# Patient Record
Sex: FEMALE | Race: WHITE | NOT HISPANIC OR LATINO | Employment: UNEMPLOYED | URBAN - NONMETROPOLITAN AREA
[De-identification: names, ages, dates, MRNs, and addresses within clinical notes are randomized per-mention and may not be internally consistent; named-entity substitution may affect disease eponyms.]

---

## 2017-03-03 ENCOUNTER — TRANSCRIBE ORDERS (OUTPATIENT)
Dept: ADMINISTRATIVE | Facility: HOSPITAL | Age: 77
End: 2017-03-03

## 2017-03-03 DIAGNOSIS — Z78.0 POSTMENOPAUSAL: Primary | ICD-10-CM

## 2017-03-29 ENCOUNTER — HOSPITAL ENCOUNTER (OUTPATIENT)
Dept: BONE DENSITY | Facility: HOSPITAL | Age: 77
Discharge: HOME | End: 2017-03-29
Attending: INTERNAL MEDICINE | Admitting: INTERNAL MEDICINE

## 2017-03-29 DIAGNOSIS — Z78.0 POSTMENOPAUSAL: ICD-10-CM

## 2017-03-29 PROCEDURE — 77080 DXA BONE DENSITY AXIAL: CPT

## 2017-03-29 PROCEDURE — 77080 DXA BONE DENSITY AXIAL: CPT | Performed by: RADIOLOGY

## 2017-08-23 ENCOUNTER — HOSPITAL ENCOUNTER (OUTPATIENT)
Dept: CT IMAGING | Facility: HOSPITAL | Age: 77
Discharge: HOME | End: 2017-08-23
Attending: INTERNAL MEDICINE | Admitting: INTERNAL MEDICINE

## 2017-08-23 ENCOUNTER — TRANSCRIBE ORDERS (OUTPATIENT)
Dept: ADMINISTRATIVE | Facility: HOSPITAL | Age: 77
End: 2017-08-23

## 2017-08-23 ENCOUNTER — HOSPITAL ENCOUNTER (OUTPATIENT)
Dept: GENERAL RADIOLOGY | Facility: HOSPITAL | Age: 77
Discharge: HOME | End: 2017-08-23
Attending: INTERNAL MEDICINE

## 2017-08-23 DIAGNOSIS — M51.9 INTERVERTEBRAL DISC DISORDER: ICD-10-CM

## 2017-08-23 DIAGNOSIS — S39.92XA INJURY OF LOWER BACK, INITIAL ENCOUNTER: ICD-10-CM

## 2017-08-23 DIAGNOSIS — S09.90XA HEAD INJURY, UNSPECIFIED: ICD-10-CM

## 2017-08-23 DIAGNOSIS — S09.90XA HEAD INJURY, UNSPECIFIED: Primary | ICD-10-CM

## 2017-08-23 DIAGNOSIS — M51.9 INTERVERTEBRAL DISC DISORDER: Primary | ICD-10-CM

## 2017-08-23 PROCEDURE — 70450 CT HEAD/BRAIN W/O DYE: CPT

## 2017-08-23 PROCEDURE — 72072 X-RAY EXAM THORAC SPINE 3VWS: CPT

## 2017-08-23 PROCEDURE — 72050 X-RAY EXAM NECK SPINE 4/5VWS: CPT

## 2017-08-23 PROCEDURE — 70450 CT HEAD/BRAIN W/O DYE: CPT | Performed by: RADIOLOGY

## 2017-08-23 PROCEDURE — 72072 X-RAY EXAM THORAC SPINE 3VWS: CPT | Performed by: RADIOLOGY

## 2017-08-23 PROCEDURE — 72110 X-RAY EXAM L-2 SPINE 4/>VWS: CPT

## 2017-08-23 PROCEDURE — 72110 X-RAY EXAM L-2 SPINE 4/>VWS: CPT | Performed by: RADIOLOGY

## 2017-08-23 PROCEDURE — 72050 X-RAY EXAM NECK SPINE 4/5VWS: CPT | Performed by: RADIOLOGY

## 2017-09-05 ENCOUNTER — TRANSCRIBE ORDERS (OUTPATIENT)
Dept: ADMINISTRATIVE | Facility: HOSPITAL | Age: 77
End: 2017-09-05

## 2017-09-05 DIAGNOSIS — M13.0 MULTIPLE JOINT DISEASE: Primary | ICD-10-CM

## 2017-09-05 DIAGNOSIS — S39.92XA UNSPECIFIED INJURY OF LOWER BACK, INITIAL ENCOUNTER: ICD-10-CM

## 2017-09-08 ENCOUNTER — HOSPITAL ENCOUNTER (OUTPATIENT)
Dept: NUCLEAR MEDICINE | Facility: HOSPITAL | Age: 77
Discharge: HOME | End: 2017-09-08
Attending: INTERNAL MEDICINE

## 2017-09-08 DIAGNOSIS — S39.92XA UNSPECIFIED INJURY OF LOWER BACK, INITIAL ENCOUNTER: ICD-10-CM

## 2017-09-08 DIAGNOSIS — M13.0 MULTIPLE JOINT DISEASE: ICD-10-CM

## 2017-09-08 PROCEDURE — 0 TECHNETIUM OXIDRONATE KIT: Performed by: INTERNAL MEDICINE

## 2017-09-08 PROCEDURE — 78306 BONE IMAGING WHOLE BODY: CPT | Performed by: RADIOLOGY

## 2017-09-08 PROCEDURE — 78306 BONE IMAGING WHOLE BODY: CPT

## 2017-09-08 PROCEDURE — A9561 TC99M OXIDRONATE: HCPCS | Performed by: INTERNAL MEDICINE

## 2017-09-08 RX ADMIN — TECHNETIUM TC 99M OXIDRONATE 1 DOSE: 3.15 INJECTION, POWDER, LYOPHILIZED, FOR SOLUTION INTRAVENOUS at 10:15

## 2017-09-28 ENCOUNTER — TRANSCRIBE ORDERS (OUTPATIENT)
Dept: ADMINISTRATIVE | Facility: HOSPITAL | Age: 77
End: 2017-09-28

## 2017-09-28 DIAGNOSIS — S22.050A TRAUMATIC COMPRESSION FRACTURE OF T6 THORACIC VERTEBRA, CLOSED, INITIAL ENCOUNTER (HCC): Primary | ICD-10-CM

## 2017-09-30 ENCOUNTER — HOSPITAL ENCOUNTER (OUTPATIENT)
Dept: MRI IMAGING | Facility: HOSPITAL | Age: 77
Discharge: HOME | End: 2017-09-30
Attending: ORTHOPAEDIC SURGERY | Admitting: ORTHOPAEDIC SURGERY

## 2017-09-30 DIAGNOSIS — S22.050A TRAUMATIC COMPRESSION FRACTURE OF T6 THORACIC VERTEBRA, CLOSED, INITIAL ENCOUNTER (HCC): ICD-10-CM

## 2017-09-30 PROCEDURE — 72146 MRI CHEST SPINE W/O DYE: CPT

## 2017-09-30 PROCEDURE — 72146 MRI CHEST SPINE W/O DYE: CPT | Performed by: RADIOLOGY

## 2021-07-21 ENCOUNTER — TRANSCRIBE ORDERS (OUTPATIENT)
Dept: ADMINISTRATIVE | Facility: HOSPITAL | Age: 81
End: 2021-07-21

## 2021-07-21 DIAGNOSIS — R01.1 CARDIAC MURMUR: Primary | ICD-10-CM

## 2021-07-21 DIAGNOSIS — M79.605 BILATERAL LEG PAIN: ICD-10-CM

## 2021-07-21 DIAGNOSIS — M79.604 BILATERAL LEG PAIN: ICD-10-CM

## 2021-07-21 DIAGNOSIS — R07.9 CHEST PAIN, UNSPECIFIED TYPE: ICD-10-CM

## 2021-07-23 ENCOUNTER — HOSPITAL ENCOUNTER (OUTPATIENT)
Dept: CARDIOLOGY | Facility: HOSPITAL | Age: 81
Discharge: HOME | End: 2021-07-23

## 2021-07-23 ENCOUNTER — HOSPITAL ENCOUNTER (OUTPATIENT)
Dept: GENERAL RADIOLOGY | Facility: HOSPITAL | Age: 81
Discharge: HOME | End: 2021-07-23

## 2021-07-23 DIAGNOSIS — M79.605 BILATERAL LEG PAIN: ICD-10-CM

## 2021-07-23 DIAGNOSIS — M79.604 BILATERAL LEG PAIN: ICD-10-CM

## 2021-07-23 DIAGNOSIS — R07.9 CHEST PAIN, UNSPECIFIED TYPE: ICD-10-CM

## 2021-07-23 DIAGNOSIS — R01.1 CARDIAC MURMUR: ICD-10-CM

## 2021-07-23 PROCEDURE — 71046 X-RAY EXAM CHEST 2 VIEWS: CPT | Performed by: RADIOLOGY

## 2021-07-23 PROCEDURE — 71046 X-RAY EXAM CHEST 2 VIEWS: CPT

## 2021-07-23 PROCEDURE — 93970 EXTREMITY STUDY: CPT | Performed by: RADIOLOGY

## 2021-07-23 PROCEDURE — 93970 EXTREMITY STUDY: CPT

## 2021-08-16 ENCOUNTER — HOSPITAL ENCOUNTER (OUTPATIENT)
Dept: NUCLEAR MEDICINE | Facility: HOSPITAL | Age: 81
Discharge: HOME | End: 2021-08-16

## 2021-08-16 ENCOUNTER — HOSPITAL ENCOUNTER (OUTPATIENT)
Dept: CARDIOLOGY | Facility: HOSPITAL | Age: 81
Discharge: HOME | End: 2021-08-16

## 2021-08-16 DIAGNOSIS — R01.1 CARDIAC MURMUR: ICD-10-CM

## 2021-08-16 DIAGNOSIS — M79.605 BILATERAL LEG PAIN: ICD-10-CM

## 2021-08-16 DIAGNOSIS — R07.9 CHEST PAIN, UNSPECIFIED TYPE: ICD-10-CM

## 2021-08-16 DIAGNOSIS — M79.604 BILATERAL LEG PAIN: ICD-10-CM

## 2021-08-16 LAB
BH CV ECHO MEAS - % IVS THICK: 20.7 %
BH CV ECHO MEAS - % LVPW THICK: 29.8 %
BH CV ECHO MEAS - ACS: 2 CM
BH CV ECHO MEAS - AO ROOT AREA (BSA CORRECTED): 1.5
BH CV ECHO MEAS - AO ROOT AREA: 6.6 CM^2
BH CV ECHO MEAS - AO ROOT DIAM: 2.9 CM
BH CV ECHO MEAS - BSA(HAYCOCK): 2 M^2
BH CV ECHO MEAS - BSA: 1.9 M^2
BH CV ECHO MEAS - BZI_BMI: 30.8 KILOGRAMS/M^2
BH CV ECHO MEAS - BZI_METRIC_HEIGHT: 165.1 CM
BH CV ECHO MEAS - BZI_METRIC_WEIGHT: 83.9 KG
BH CV ECHO MEAS - EDV(CUBED): 118.4 ML
BH CV ECHO MEAS - EDV(MOD-SP4): 47.4 ML
BH CV ECHO MEAS - EDV(TEICH): 113.4 ML
BH CV ECHO MEAS - EF(CUBED): 72.1 %
BH CV ECHO MEAS - EF(MOD-SP4): 51.9 %
BH CV ECHO MEAS - EF(TEICH): 63.6 %
BH CV ECHO MEAS - ESV(CUBED): 33.1 ML
BH CV ECHO MEAS - ESV(MOD-SP4): 22.8 ML
BH CV ECHO MEAS - ESV(TEICH): 41.3 ML
BH CV ECHO MEAS - FS: 34.6 %
BH CV ECHO MEAS - IVS/LVPW: 1.3
BH CV ECHO MEAS - IVSD: 1.6 CM
BH CV ECHO MEAS - IVSS: 2 CM
BH CV ECHO MEAS - LA DIMENSION: 4.3 CM
BH CV ECHO MEAS - LA/AO: 1.5
BH CV ECHO MEAS - LV DIASTOLIC VOL/BSA (35-75): 24.8 ML/M^2
BH CV ECHO MEAS - LV MASS(C)D: 291.7 GRAMS
BH CV ECHO MEAS - LV MASS(C)DI: 152.4 GRAMS/M^2
BH CV ECHO MEAS - LV MASS(C)S: 231.2 GRAMS
BH CV ECHO MEAS - LV MASS(C)SI: 120.8 GRAMS/M^2
BH CV ECHO MEAS - LV SYSTOLIC VOL/BSA (12-30): 11.9 ML/M^2
BH CV ECHO MEAS - LVIDD: 4.9 CM
BH CV ECHO MEAS - LVIDS: 3.2 CM
BH CV ECHO MEAS - LVLD AP4: 6.1 CM
BH CV ECHO MEAS - LVLS AP4: 5.3 CM
BH CV ECHO MEAS - LVOT AREA (M): 2.8 CM^2
BH CV ECHO MEAS - LVOT AREA: 2.8 CM^2
BH CV ECHO MEAS - LVOT DIAM: 1.9 CM
BH CV ECHO MEAS - LVPWD: 1.2 CM
BH CV ECHO MEAS - LVPWS: 1.6 CM
BH CV ECHO MEAS - MV A MAX VEL: 133 CM/SEC
BH CV ECHO MEAS - MV E MAX VEL: 97.9 CM/SEC
BH CV ECHO MEAS - MV E/A: 0.74
BH CV ECHO MEAS - PA ACC TIME: 0.11 SEC
BH CV ECHO MEAS - PA PR(ACCEL): 30 MMHG
BH CV ECHO MEAS - RAP SYSTOLE: 10 MMHG
BH CV ECHO MEAS - RVDD: 1.4 CM
BH CV ECHO MEAS - RVSP: 58.2 MMHG
BH CV ECHO MEAS - SI(CUBED): 44.6 ML/M^2
BH CV ECHO MEAS - SI(MOD-SP4): 12.9 ML/M^2
BH CV ECHO MEAS - SI(TEICH): 37.7 ML/M^2
BH CV ECHO MEAS - SV(CUBED): 85.3 ML
BH CV ECHO MEAS - SV(MOD-SP4): 24.6 ML
BH CV ECHO MEAS - SV(TEICH): 72.1 ML
BH CV ECHO MEAS - TR MAX VEL: 347 CM/SEC
BH CV NUCLEAR PRIOR STUDY: 3
BH CV REST NUCLEAR ISOTOPE DOSE: 10.2 MCI
BH CV STRESS BP STAGE 1: NORMAL
BH CV STRESS BP STAGE 2: NORMAL
BH CV STRESS COMMENTS STAGE 1: NORMAL
BH CV STRESS COMMENTS STAGE 2: NORMAL
BH CV STRESS DOSE REGADENOSON STAGE 1: 0.4
BH CV STRESS DURATION MIN STAGE 1: 0
BH CV STRESS DURATION MIN STAGE 2: 4
BH CV STRESS DURATION SEC STAGE 1: 10
BH CV STRESS DURATION SEC STAGE 2: 0
BH CV STRESS HR STAGE 1: 87
BH CV STRESS HR STAGE 2: 90
BH CV STRESS NUCLEAR ISOTOPE DOSE: 30.2 MCI
BH CV STRESS PROTOCOL 1: NORMAL
BH CV STRESS RECOVERY BP: NORMAL MMHG
BH CV STRESS RECOVERY HR: 83 BPM
BH CV STRESS STAGE 1: 1
BH CV STRESS STAGE 2: 2
MAXIMAL PREDICTED HEART RATE: 141 BPM
MAXIMAL PREDICTED HEART RATE: 141 BPM
PERCENT MAX PREDICTED HR: 63.83 %
STRESS BASELINE BP: NORMAL MMHG
STRESS BASELINE HR: 76 BPM
STRESS PERCENT HR: 75 %
STRESS POST PEAK BP: NORMAL MMHG
STRESS POST PEAK HR: 90 BPM
STRESS TARGET HR: 120 BPM
STRESS TARGET HR: 120 BPM

## 2021-08-16 PROCEDURE — 78452 HT MUSCLE IMAGE SPECT MULT: CPT | Performed by: INTERNAL MEDICINE

## 2021-08-16 PROCEDURE — 25010000002 REGADENOSON 0.4 MG/5ML SOLUTION: Performed by: INTERNAL MEDICINE

## 2021-08-16 PROCEDURE — 93018 CV STRESS TEST I&R ONLY: CPT | Performed by: INTERNAL MEDICINE

## 2021-08-16 PROCEDURE — 93306 TTE W/DOPPLER COMPLETE: CPT

## 2021-08-16 PROCEDURE — 0 TECHNETIUM SESTAMIBI: Performed by: INTERNAL MEDICINE

## 2021-08-16 PROCEDURE — A9500 TC99M SESTAMIBI: HCPCS | Performed by: INTERNAL MEDICINE

## 2021-08-16 PROCEDURE — 93017 CV STRESS TEST TRACING ONLY: CPT

## 2021-08-16 PROCEDURE — 78452 HT MUSCLE IMAGE SPECT MULT: CPT

## 2021-08-16 PROCEDURE — 93306 TTE W/DOPPLER COMPLETE: CPT | Performed by: INTERNAL MEDICINE

## 2021-08-16 RX ORDER — CELECOXIB 100 MG/1
100 CAPSULE ORAL 2 TIMES DAILY PRN
COMMUNITY

## 2021-08-16 RX ORDER — LISINOPRIL 10 MG/1
10 TABLET ORAL DAILY
Status: ON HOLD | COMMUNITY
End: 2022-06-06

## 2021-08-16 RX ORDER — METOPROLOL SUCCINATE 25 MG/1
50 TABLET, EXTENDED RELEASE ORAL 2 TIMES DAILY
Status: ON HOLD | COMMUNITY
End: 2022-06-06 | Stop reason: DRUGHIGH

## 2021-08-16 RX ORDER — LOSARTAN POTASSIUM 50 MG/1
100 TABLET ORAL DAILY
Status: ON HOLD | COMMUNITY
End: 2022-06-06 | Stop reason: DRUGHIGH

## 2021-08-16 RX ADMIN — REGADENOSON 0.4 MG: 0.08 INJECTION, SOLUTION INTRAVENOUS at 10:09

## 2021-08-16 RX ADMIN — TECHNETIUM TC 99M SESTAMIBI 1 DOSE: 1 INJECTION INTRAVENOUS at 08:20

## 2021-08-16 RX ADMIN — TECHNETIUM TC 99M SESTAMIBI 1 DOSE: 1 INJECTION INTRAVENOUS at 10:09

## 2021-09-01 ENCOUNTER — TRANSCRIBE ORDERS (OUTPATIENT)
Dept: ADMINISTRATIVE | Facility: HOSPITAL | Age: 81
End: 2021-09-01

## 2021-09-01 DIAGNOSIS — R07.9 CHEST PAIN, UNSPECIFIED TYPE: Primary | ICD-10-CM

## 2021-09-20 ENCOUNTER — HOSPITAL ENCOUNTER (OUTPATIENT)
Dept: CT IMAGING | Facility: HOSPITAL | Age: 81
Discharge: HOME | End: 2021-09-20
Admitting: INTERNAL MEDICINE

## 2021-09-20 DIAGNOSIS — R07.9 CHEST PAIN, UNSPECIFIED TYPE: ICD-10-CM

## 2021-09-20 LAB — CREAT BLDA-MCNC: 1.1 MG/DL (ref 0.6–1.3)

## 2021-09-20 PROCEDURE — 71270 CT THORAX DX C-/C+: CPT | Performed by: RADIOLOGY

## 2021-09-20 PROCEDURE — 25010000002 IOPAMIDOL 61 % SOLUTION: Performed by: INTERNAL MEDICINE

## 2021-09-20 PROCEDURE — 82565 ASSAY OF CREATININE: CPT

## 2021-09-20 PROCEDURE — 71270 CT THORAX DX C-/C+: CPT

## 2021-09-20 RX ADMIN — IOPAMIDOL 60 ML: 612 INJECTION, SOLUTION INTRAVENOUS at 09:51

## 2022-06-06 ENCOUNTER — HOSPITAL ENCOUNTER (OUTPATIENT)
Facility: HOSPITAL | Age: 82
Setting detail: OBSERVATION
Discharge: HOME | End: 2022-06-07
Attending: STUDENT IN AN ORGANIZED HEALTH CARE EDUCATION/TRAINING PROGRAM | Admitting: INTERNAL MEDICINE

## 2022-06-06 ENCOUNTER — APPOINTMENT (OUTPATIENT)
Dept: GENERAL RADIOLOGY | Facility: HOSPITAL | Age: 82
End: 2022-06-06

## 2022-06-06 ENCOUNTER — APPOINTMENT (OUTPATIENT)
Dept: CT IMAGING | Facility: HOSPITAL | Age: 82
End: 2022-06-06

## 2022-06-06 DIAGNOSIS — J18.9 PNEUMONIA OF BOTH LOWER LOBES DUE TO INFECTIOUS ORGANISM: ICD-10-CM

## 2022-06-06 DIAGNOSIS — I27.20 PULMONARY HYPERTENSION: ICD-10-CM

## 2022-06-06 DIAGNOSIS — J96.01 ACUTE RESPIRATORY FAILURE WITH HYPOXIA: Primary | ICD-10-CM
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
## 2022-06-06 LAB
A-A DO2: 26.5 MMHG (ref 0–300)
ALBUMIN SERPL-MCNC: 4.41 G/DL (ref 3.5–5.2)
ALBUMIN/GLOB SERPL: 1.5 G/DL
ALP SERPL-CCNC: 70 U/L (ref 39–117)
ALT SERPL W P-5'-P-CCNC: 10 U/L (ref 1–33)
ANION GAP SERPL CALCULATED.3IONS-SCNC: 10 MMOL/L (ref 5–15)
ARTERIAL PATENCY WRIST A: ABNORMAL
AST SERPL-CCNC: 16 U/L (ref 1–32)
ATMOSPHERIC PRESS: 726 MMHG
BASE EXCESS BLDA CALC-SCNC: 1.9 MMOL/L (ref 0–2)
BASOPHILS # BLD AUTO: 0.1 10*3/MM3 (ref 0–0.2)
BASOPHILS NFR BLD AUTO: 1 % (ref 0–1.5)
BDY SITE: ABNORMAL
BILIRUB SERPL-MCNC: 0.5 MG/DL (ref 0–1.2)
BODY TEMPERATURE: 0 C
BUN SERPL-MCNC: 20 MG/DL (ref 8–23)
BUN/CREAT SERPL: 17.2 (ref 7–25)
CALCIUM SPEC-SCNC: 9.7 MG/DL (ref 8.6–10.5)
CHLORIDE SERPL-SCNC: 98 MMOL/L (ref 98–107)
CO2 BLDA-SCNC: 29.8 MMOL/L (ref 22–33)
CO2 SERPL-SCNC: 26 MMOL/L (ref 22–29)
COHGB MFR BLD: 1.8 % (ref 0–5)
CREAT SERPL-MCNC: 1.16 MG/DL (ref 0.57–1)
D-LACTATE SERPL-SCNC: 1.3 MMOL/L (ref 0.5–2)
DEPRECATED RDW RBC AUTO: 61.6 FL (ref 37–54)
EGFRCR SERPLBLD CKD-EPI 2021: 47.8 ML/MIN/1.73
EOSINOPHIL # BLD AUTO: 0.35 10*3/MM3 (ref 0–0.4)
EOSINOPHIL NFR BLD AUTO: 3.6 % (ref 0.3–6.2)
ERYTHROCYTE [DISTWIDTH] IN BLOOD BY AUTOMATED COUNT: 15.6 % (ref 12.3–15.4)
FLUAV RNA RESP QL NAA+PROBE: NOT DETECTED
FLUBV RNA RESP QL NAA+PROBE: NOT DETECTED
GLOBULIN UR ELPH-MCNC: 2.9 GM/DL
GLUCOSE SERPL-MCNC: 139 MG/DL (ref 65–99)
HCO3 BLDA-SCNC: 28.3 MMOL/L (ref 20–26)
HCT VFR BLD AUTO: 37.6 % (ref 34–46.6)
HCT VFR BLD CALC: 37.7 % (ref 38–51)
HGB BLD-MCNC: 12.4 G/DL (ref 12–15.9)
HGB BLDA-MCNC: 12.3 G/DL (ref 13.5–17.5)
IMM GRANULOCYTES # BLD AUTO: 0.07 10*3/MM3 (ref 0–0.05)
IMM GRANULOCYTES NFR BLD AUTO: 0.7 % (ref 0–0.5)
INHALED O2 CONCENTRATION: 21 %
INR PPP: 0.92 (ref 0.9–1.1)
LYMPHOCYTES # BLD AUTO: 1.78 10*3/MM3 (ref 0.7–3.1)
LYMPHOCYTES NFR BLD AUTO: 18.2 % (ref 19.6–45.3)
Lab: ABNORMAL
MCH RBC QN AUTO: 35.8 PG (ref 26.6–33)
MCHC RBC AUTO-ENTMCNC: 33 G/DL (ref 31.5–35.7)
MCV RBC AUTO: 108.7 FL (ref 79–97)
METHGB BLD QL: 0.3 % (ref 0–3)
MODALITY: ABNORMAL
MONOCYTES # BLD AUTO: 0.52 10*3/MM3 (ref 0.1–0.9)
MONOCYTES NFR BLD AUTO: 5.3 % (ref 5–12)
NEUTROPHILS NFR BLD AUTO: 6.98 10*3/MM3 (ref 1.7–7)
NEUTROPHILS NFR BLD AUTO: 71.2 % (ref 42.7–76)
NOTE: ABNORMAL
NRBC BLD AUTO-RTO: 0.3 /100 WBC (ref 0–0.2)
NT-PROBNP SERPL-MCNC: 85.1 PG/ML (ref 0–1800)
OXYHGB MFR BLDV: 85.6 % (ref 94–99)
PCO2 BLDA: 51 MM HG (ref 35–45)
PCO2 TEMP ADJ BLD: ABNORMAL MM[HG]
PH BLDA: 7.35 PH UNITS (ref 7.35–7.45)
PH, TEMP CORRECTED: ABNORMAL
PLATELET # BLD AUTO: 274 10*3/MM3 (ref 140–450)
PMV BLD AUTO: 9.4 FL (ref 6–12)
PO2 BLDA: 58.6 MM HG (ref 83–108)
PO2 TEMP ADJ BLD: ABNORMAL MM[HG]
POTASSIUM SERPL-SCNC: 4.5 MMOL/L (ref 3.5–5.2)
PROCALCITONIN SERPL-MCNC: 0.08 NG/ML (ref 0–0.25)
PROT SERPL-MCNC: 7.3 G/DL (ref 6–8.5)
PROTHROMBIN TIME: 12.5 SECONDS (ref 12.1–14.7)
QT INTERVAL: 358 MS
QTC INTERVAL: 420 MS
RBC # BLD AUTO: 3.46 10*6/MM3 (ref 3.77–5.28)
SAO2 % BLDCOA: 87.4 % (ref 94–99)
SARS-COV-2 RNA RESP QL NAA+PROBE: NOT DETECTED
SODIUM SERPL-SCNC: 134 MMOL/L (ref 136–145)
TROPONIN T SERPL-MCNC: <0.01 NG/ML (ref 0–0.03)
VENTILATOR MODE: ABNORMAL
WBC NRBC COR # BLD: 9.8 10*3/MM3 (ref 3.4–10.8)

## 2022-06-06 PROCEDURE — 93005 ELECTROCARDIOGRAM TRACING: CPT | Performed by: STUDENT IN AN ORGANIZED HEALTH CARE EDUCATION/TRAINING PROGRAM

## 2022-06-06 PROCEDURE — 82805 BLOOD GASES W/O2 SATURATION: CPT

## 2022-06-06 PROCEDURE — 36415 COLL VENOUS BLD VENIPUNCTURE: CPT

## 2022-06-06 PROCEDURE — 87636 SARSCOV2 & INF A&B AMP PRB: CPT | Performed by: STUDENT IN AN ORGANIZED HEALTH CARE EDUCATION/TRAINING PROGRAM

## 2022-06-06 PROCEDURE — G0378 HOSPITAL OBSERVATION PER HR: HCPCS

## 2022-06-06 PROCEDURE — 80053 COMPREHEN METABOLIC PANEL: CPT | Performed by: STUDENT IN AN ORGANIZED HEALTH CARE EDUCATION/TRAINING PROGRAM

## 2022-06-06 PROCEDURE — 71275 CT ANGIOGRAPHY CHEST: CPT

## 2022-06-06 PROCEDURE — 73030 X-RAY EXAM OF SHOULDER: CPT

## 2022-06-06 PROCEDURE — 73030 X-RAY EXAM OF SHOULDER: CPT | Performed by: RADIOLOGY

## 2022-06-06 PROCEDURE — 72128 CT CHEST SPINE W/O DYE: CPT | Performed by: RADIOLOGY

## 2022-06-06 PROCEDURE — 83880 ASSAY OF NATRIURETIC PEPTIDE: CPT | Performed by: STUDENT IN AN ORGANIZED HEALTH CARE EDUCATION/TRAINING PROGRAM

## 2022-06-06 PROCEDURE — 71045 X-RAY EXAM CHEST 1 VIEW: CPT

## 2022-06-06 PROCEDURE — 87040 BLOOD CULTURE FOR BACTERIA: CPT | Performed by: STUDENT IN AN ORGANIZED HEALTH CARE EDUCATION/TRAINING PROGRAM

## 2022-06-06 PROCEDURE — 71045 X-RAY EXAM CHEST 1 VIEW: CPT | Performed by: RADIOLOGY

## 2022-06-06 PROCEDURE — 99220 PR INITIAL OBSERVATION CARE/DAY 70 MINUTES: CPT | Performed by: INTERNAL MEDICINE

## 2022-06-06 PROCEDURE — 72125 CT NECK SPINE W/O DYE: CPT | Performed by: RADIOLOGY

## 2022-06-06 PROCEDURE — 96375 TX/PRO/DX INJ NEW DRUG ADDON: CPT

## 2022-06-06 PROCEDURE — 73080 X-RAY EXAM OF ELBOW: CPT

## 2022-06-06 PROCEDURE — 71275 CT ANGIOGRAPHY CHEST: CPT | Performed by: RADIOLOGY

## 2022-06-06 PROCEDURE — 72131 CT LUMBAR SPINE W/O DYE: CPT

## 2022-06-06 PROCEDURE — 25010000002 CEFTRIAXONE PER 250 MG: Performed by: STUDENT IN AN ORGANIZED HEALTH CARE EDUCATION/TRAINING PROGRAM

## 2022-06-06 PROCEDURE — 85610 PROTHROMBIN TIME: CPT | Performed by: STUDENT IN AN ORGANIZED HEALTH CARE EDUCATION/TRAINING PROGRAM

## 2022-06-06 PROCEDURE — 96365 THER/PROPH/DIAG IV INF INIT: CPT

## 2022-06-06 PROCEDURE — 84145 PROCALCITONIN (PCT): CPT | Performed by: STUDENT IN AN ORGANIZED HEALTH CARE EDUCATION/TRAINING PROGRAM

## 2022-06-06 PROCEDURE — 72128 CT CHEST SPINE W/O DYE: CPT

## 2022-06-06 PROCEDURE — 84484 ASSAY OF TROPONIN QUANT: CPT | Performed by: STUDENT IN AN ORGANIZED HEALTH CARE EDUCATION/TRAINING PROGRAM

## 2022-06-06 PROCEDURE — 85025 COMPLETE CBC W/AUTO DIFF WBC: CPT | Performed by: STUDENT IN AN ORGANIZED HEALTH CARE EDUCATION/TRAINING PROGRAM

## 2022-06-06 PROCEDURE — 72125 CT NECK SPINE W/O DYE: CPT

## 2022-06-06 PROCEDURE — 25010000002 METHYLPREDNISOLONE PER 125 MG: Performed by: STUDENT IN AN ORGANIZED HEALTH CARE EDUCATION/TRAINING PROGRAM

## 2022-06-06 PROCEDURE — 83605 ASSAY OF LACTIC ACID: CPT | Performed by: STUDENT IN AN ORGANIZED HEALTH CARE EDUCATION/TRAINING PROGRAM

## 2022-06-06 PROCEDURE — 0 IOVERSOL 74 % SOLUTION: Performed by: STUDENT IN AN ORGANIZED HEALTH CARE EDUCATION/TRAINING PROGRAM

## 2022-06-06 PROCEDURE — 72131 CT LUMBAR SPINE W/O DYE: CPT | Performed by: RADIOLOGY

## 2022-06-06 PROCEDURE — 83050 HGB METHEMOGLOBIN QUAN: CPT

## 2022-06-06 PROCEDURE — C9803 HOPD COVID-19 SPEC COLLECT: HCPCS

## 2022-06-06 PROCEDURE — 82375 ASSAY CARBOXYHB QUANT: CPT

## 2022-06-06 PROCEDURE — 94640 AIRWAY INHALATION TREATMENT: CPT

## 2022-06-06 PROCEDURE — 94799 UNLISTED PULMONARY SVC/PX: CPT

## 2022-06-06 PROCEDURE — 36600 WITHDRAWAL OF ARTERIAL BLOOD: CPT

## 2022-06-06 PROCEDURE — 99284 EMERGENCY DEPT VISIT MOD MDM: CPT

## 2022-06-06 RX ORDER — LOSARTAN POTASSIUM 50 MG/1
100 TABLET ORAL DAILY
Status: CANCELLED | OUTPATIENT
Start: 2022-06-07

## 2022-06-06 RX ORDER — HYDRALAZINE HYDROCHLORIDE 10 MG/1
10 TABLET, FILM COATED ORAL EVERY 8 HOURS PRN
Status: DISCONTINUED | OUTPATIENT
Start: 2022-06-06 | End: 2022-06-07 | Stop reason: HOSPADM

## 2022-06-06 RX ORDER — CELECOXIB 100 MG/1
100 CAPSULE ORAL 2 TIMES DAILY PRN
Status: CANCELLED | OUTPATIENT
Start: 2022-06-06

## 2022-06-06 RX ORDER — OXAZEPAM 15 MG/1
15 CAPSULE ORAL DAILY PRN
COMMUNITY

## 2022-06-06 RX ORDER — METHYLPREDNISOLONE SODIUM SUCCINATE 125 MG/2ML
125 INJECTION, POWDER, LYOPHILIZED, FOR SOLUTION INTRAMUSCULAR; INTRAVENOUS ONCE
Status: COMPLETED | OUTPATIENT
Start: 2022-06-06 | End: 2022-06-06

## 2022-06-06 RX ORDER — ONDANSETRON 2 MG/ML
4 INJECTION INTRAMUSCULAR; INTRAVENOUS EVERY 6 HOURS PRN
Status: DISCONTINUED | OUTPATIENT
Start: 2022-06-06 | End: 2022-06-07 | Stop reason: HOSPADM

## 2022-06-06 RX ORDER — IPRATROPIUM BROMIDE AND ALBUTEROL SULFATE 2.5; .5 MG/3ML; MG/3ML
3 SOLUTION RESPIRATORY (INHALATION)
Status: DISCONTINUED | OUTPATIENT
Start: 2022-06-06 | End: 2022-06-07

## 2022-06-06 RX ORDER — SODIUM CHLORIDE 0.9 % (FLUSH) 0.9 %
10 SYRINGE (ML) INJECTION AS NEEDED
Status: DISCONTINUED | OUTPATIENT
Start: 2022-06-06 | End: 2022-06-07 | Stop reason: HOSPADM

## 2022-06-06 RX ORDER — ACETAMINOPHEN 325 MG/1
650 TABLET ORAL EVERY 6 HOURS PRN
Status: DISCONTINUED | OUTPATIENT
Start: 2022-06-06 | End: 2022-06-07 | Stop reason: HOSPADM

## 2022-06-06 RX ORDER — CYANOCOBALAMIN 1000 UG/ML
1000 INJECTION, SOLUTION INTRAMUSCULAR; SUBCUTANEOUS
COMMUNITY
Start: 2022-06-18

## 2022-06-06 RX ORDER — CYANOCOBALAMIN 1000 UG/ML
1000 INJECTION, SOLUTION INTRAMUSCULAR; SUBCUTANEOUS
Status: CANCELLED | OUTPATIENT
Start: 2022-06-18

## 2022-06-06 RX ORDER — METOPROLOL TARTRATE 50 MG/1
50 TABLET, FILM COATED ORAL EVERY 12 HOURS SCHEDULED
Status: DISCONTINUED | OUTPATIENT
Start: 2022-06-06 | End: 2022-06-07 | Stop reason: HOSPADM

## 2022-06-06 RX ORDER — SODIUM CHLORIDE 0.9 % (FLUSH) 0.9 %
10 SYRINGE (ML) INJECTION EVERY 12 HOURS SCHEDULED
Status: DISCONTINUED | OUTPATIENT
Start: 2022-06-06 | End: 2022-06-07 | Stop reason: HOSPADM

## 2022-06-06 RX ORDER — METOPROLOL TARTRATE 50 MG/1
50 TABLET, FILM COATED ORAL 2 TIMES DAILY
COMMUNITY

## 2022-06-06 RX ORDER — LOSARTAN POTASSIUM 100 MG/1
100 TABLET ORAL DAILY
COMMUNITY

## 2022-06-06 RX ORDER — METOPROLOL TARTRATE 50 MG/1
50 TABLET, FILM COATED ORAL 2 TIMES DAILY
Status: CANCELLED | OUTPATIENT
Start: 2022-06-06

## 2022-06-06 RX ORDER — LORAZEPAM 0.5 MG/1
0.5 TABLET ORAL DAILY PRN
Status: CANCELLED | OUTPATIENT
Start: 2022-06-06

## 2022-06-06 RX ORDER — CHOLECALCIFEROL (VITAMIN D3) 50 MCG
2000 TABLET ORAL DAILY
COMMUNITY

## 2022-06-06 RX ADMIN — CEFTRIAXONE 1 G: 1 INJECTION, POWDER, FOR SOLUTION INTRAMUSCULAR; INTRAVENOUS at 15:50

## 2022-06-06 RX ADMIN — Medication 10 ML: at 21:29

## 2022-06-06 RX ADMIN — SODIUM CHLORIDE 500 ML: 9 INJECTION, SOLUTION INTRAVENOUS at 12:41

## 2022-06-06 RX ADMIN — METHYLPREDNISOLONE SODIUM SUCCINATE 125 MG: 125 INJECTION, POWDER, FOR SOLUTION INTRAMUSCULAR; INTRAVENOUS at 16:45

## 2022-06-06 RX ADMIN — IOVERSOL 100 ML: 741 INJECTION INTRA-ARTERIAL; INTRAVENOUS at 14:17

## 2022-06-06 RX ADMIN — METOPROLOL TARTRATE 50 MG: 50 TABLET, FILM COATED ORAL at 21:29

## 2022-06-06 RX ADMIN — ACETAMINOPHEN 650 MG: 325 TABLET ORAL at 21:29

## 2022-06-06 RX ADMIN — IPRATROPIUM BROMIDE AND ALBUTEROL SULFATE 3 ML: .5; 3 SOLUTION RESPIRATORY (INHALATION) at 16:23
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
## 2022-06-06 NOTE — ED NOTES
Report from ANDRE Obrien. Pt care assumed at this time. Pt is in CT. Friend at bedside instructed to push call light when pt returns.

## 2022-06-06 NOTE — PLAN OF CARE
Goal Outcome Evaluation:            Pt alert and oriented and independent. 2lnc. Pt states she will be staying overnight for observation of her breathing.
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
## 2022-06-07 ENCOUNTER — APPOINTMENT (OUTPATIENT)
Dept: CARDIOLOGY | Facility: HOSPITAL | Age: 82
End: 2022-06-07

## 2022-06-07 VITALS
OXYGEN SATURATION: 94 % | BODY MASS INDEX: 30.53 KG/M2 | HEIGHT: 66 IN | WEIGHT: 190 LBS | RESPIRATION RATE: 18 BRPM | SYSTOLIC BLOOD PRESSURE: 145 MMHG | DIASTOLIC BLOOD PRESSURE: 59 MMHG | TEMPERATURE: 97.6 F | HEART RATE: 89 BPM

## 2022-06-07 LAB
BH CV ECHO MEAS - ACS: 2.3 CM
BH CV ECHO MEAS - AO MAX PG: 26.8 MMHG
BH CV ECHO MEAS - AO MEAN PG: 12 MMHG
BH CV ECHO MEAS - AO ROOT DIAM: 3.5 CM
BH CV ECHO MEAS - AO V2 MAX: 259 CM/SEC
BH CV ECHO MEAS - AO V2 VTI: 43.6 CM
BH CV ECHO MEAS - AVA(I,D): 3.4 CM2
BH CV ECHO MEAS - EDV(CUBED): 118.4 ML
BH CV ECHO MEAS - EDV(MOD-SP4): 63.7 ML
BH CV ECHO MEAS - EF(MOD-SP4): 73.5 %
BH CV ECHO MEAS - ESV(CUBED): 29.2 ML
BH CV ECHO MEAS - ESV(MOD-SP4): 16.9 ML
BH CV ECHO MEAS - FS: 37.3 %
BH CV ECHO MEAS - IVS/LVPW: 1.14 CM
BH CV ECHO MEAS - IVSD: 1.38 CM
BH CV ECHO MEAS - LA DIMENSION: 3.7 CM
BH CV ECHO MEAS - LAT PEAK E' VEL: 12.2 CM/SEC
BH CV ECHO MEAS - LV DIASTOLIC VOL/BSA (35-75): 32.5 CM2
BH CV ECHO MEAS - LV MASS(C)D: 253.1 GRAMS
BH CV ECHO MEAS - LV MAX PG: 13 MMHG
BH CV ECHO MEAS - LV MEAN PG: 6 MMHG
BH CV ECHO MEAS - LV SYSTOLIC VOL/BSA (12-30): 8.6 CM2
BH CV ECHO MEAS - LV V1 MAX: 180 CM/SEC
BH CV ECHO MEAS - LV V1 VTI: 35.7 CM
BH CV ECHO MEAS - LVIDD: 4.9 CM
BH CV ECHO MEAS - LVIDS: 3.1 CM
BH CV ECHO MEAS - LVOT AREA: 4.2 CM2
BH CV ECHO MEAS - LVOT DIAM: 2.3 CM
BH CV ECHO MEAS - LVPWD: 1.21 CM
BH CV ECHO MEAS - MED PEAK E' VEL: 9 CM/SEC
BH CV ECHO MEAS - MV A MAX VEL: 136 CM/SEC
BH CV ECHO MEAS - MV E MAX VEL: 103 CM/SEC
BH CV ECHO MEAS - MV E/A: 0.76
BH CV ECHO MEAS - PA ACC TIME: 0.1 SEC
BH CV ECHO MEAS - PA PR(ACCEL): 36.3 MMHG
BH CV ECHO MEAS - RAP SYSTOLE: 10 MMHG
BH CV ECHO MEAS - RVSP: 68.4 MMHG
BH CV ECHO MEAS - SI(MOD-SP4): 23.9 ML/M2
BH CV ECHO MEAS - SV(LVOT): 148.3 ML
BH CV ECHO MEAS - SV(MOD-SP4): 46.8 ML
BH CV ECHO MEAS - TAPSE (>1.6): 2.08 CM
BH CV ECHO MEAS - TR MAX PG: 58.4 MMHG
BH CV ECHO MEAS - TR MAX VEL: 382 CM/SEC
BH CV ECHO MEASUREMENTS AVERAGE E/E' RATIO: 9.72
LEFT ATRIUM VOLUME INDEX: 18.9 ML/M2
MAXIMAL PREDICTED HEART RATE: 140 BPM
STRESS TARGET HR: 119 BPM

## 2022-06-07 PROCEDURE — 97161 PT EVAL LOW COMPLEX 20 MIN: CPT

## 2022-06-07 PROCEDURE — 93306 TTE W/DOPPLER COMPLETE: CPT

## 2022-06-07 PROCEDURE — 93306 TTE W/DOPPLER COMPLETE: CPT | Performed by: SPECIALIST

## 2022-06-07 PROCEDURE — 94799 UNLISTED PULMONARY SVC/PX: CPT

## 2022-06-07 PROCEDURE — G0378 HOSPITAL OBSERVATION PER HR: HCPCS

## 2022-06-07 PROCEDURE — 99217 PR OBSERVATION CARE DISCHARGE MANAGEMENT: CPT | Performed by: INTERNAL MEDICINE

## 2022-06-07 PROCEDURE — 97165 OT EVAL LOW COMPLEX 30 MIN: CPT

## 2022-06-07 RX ORDER — LOSARTAN POTASSIUM 50 MG/1
100 TABLET ORAL
Status: DISCONTINUED | OUTPATIENT
Start: 2022-06-07 | End: 2022-06-07 | Stop reason: HOSPADM

## 2022-06-07 RX ADMIN — LOSARTAN POTASSIUM 100 MG: 50 TABLET, FILM COATED ORAL at 09:15

## 2022-06-07 RX ADMIN — METOPROLOL TARTRATE 50 MG: 50 TABLET, FILM COATED ORAL at 09:14

## 2022-06-07 RX ADMIN — Medication 10 ML: at 09:16

## 2022-06-07 NOTE — CASE MANAGEMENT/SOCIAL WORK
Discharge Planning Assessment   [place]     Patient Name: Aliyah Adkins  MRN: [id number]  Today's Date: 6/7/2022    Admit Date: 6/6/2022           Discharge Plan     Row Name 06/07/22 1135       Plan    Final Discharge Disposition Code 01 - home or self-care    Final Note Pt. is being discharged home on this date. Pt. states her sister is going to provide transportation for her on this date. CM has set up DME for pt. No other needs identified.                     JANINE Haro

## 2022-06-07 NOTE — NURSING NOTE
Pt oxygen at sitting fluctating between 88 and 90%. Ambulating in room pt dropped to 84%. No dizziness reported but pt reported feeling SOB sometimes while doing home chores.

## 2022-06-07 NOTE — PLAN OF CARE
Goal Outcome Evaluation:  Plan of Care Reviewed With: patient        Progress: no change  Outcome Evaluation: Pt resting in bed at this time. VSS. No complaints or acute changes. Will continue to monitor.
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
## 2022-06-07 NOTE — CASE MANAGEMENT/SOCIAL WORK
Continued Stay Note  [facility]     Patient Name: Aliyah Adkins  MRN: [id number]  Today's Date: 6/7/2022    Admit Date: 6/6/2022     Discharge Plan     Row Name 06/07/22 1132       Plan    Plan Pt. admitted 6/6/22. SS received a consult for discharge planning. SS spoke with pt on this date. Pt lives at [address] [place] [address]. Pt's PCP is Jabari Keller. Pt. does not have HH/DME. Pt. utilizes [facility]. Pt. does not have a POA, but states she has a will. Pt states she sister will be providing transportation. Home at discharge. SS following.  Simultaneous filing. User may be unaware of other data.    Provided Post Acute Provider List? Yes    Post Acute Provider List DME Supplier    Delivered To Patient    Method of Delivery Telephone  Patient states she has no prefernce of DME company               Discharge Codes    No documentation.               Expected Discharge Date and Time     Expected Discharge Date Expected Discharge Time    Jun 7, 2022             Nicole Peters RN

## 2022-06-07 NOTE — CASE MANAGEMENT/SOCIAL WORK
Continued Stay Note  [facility]     Patient Name: Aliyah Adkins  MRN: [id number]  Today's Date: 6/7/2022    Admit Date: 6/6/2022     Discharge Plan     Row Name 06/07/22 1140       Plan    Plan CM faxed orders to [facility] and spoke with Sola who states they will bring up portable tank tank for patient before discharge.    Row Name 06/07/22 1135       Plan    Final Discharge Disposition Code 01 - home or self-care    Final Note Pt. is being discharged home on this date. Pt. states her sister is going to provide transportation for her on this date. CM has set up DME for pt. No other needs identified.    Row Name 06/07/22 1132       Plan    Plan Pt. admitted 6/6/22. SS received a consult for discharge planning. SS spoke with pt on this date. Pt lives at [address] [place] [address]. Pt's PCP is Jabari Keller. Pt. does not have HH/DME. Pt. utilizes [facility]. Pt. does not have a POA, but states she has a will. Pt states she sister will be providing transportation. Home at discharge. SS following.  Simultaneous filing. User may be unaware of other data.    Provided Post Acute Provider List? Yes    Post Acute Provider List DME Supplier    Delivered To Patient    Method of Delivery Telephone  Patient states she has no prefernce of DME company               Discharge Codes    No documentation.               Expected Discharge Date and Time     Expected Discharge Date Expected Discharge Time    Jun 7, 2022             Nicole Peters RN
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
## 2022-06-07 NOTE — CASE MANAGEMENT/SOCIAL WORK
Discharge Planning Assessment  [facility]     Patient Name: Aliyah Adkins  MRN: [id number]  Today's Date: 6/7/2022    Admit Date: 6/6/2022     Discharge Needs Assessment     Row Name 06/07/22 1131       Living Environment    People in Home alone    Current Living Arrangements home    Primary Care Provided by self    Provides Primary Care For no one    Family Caregiver if Needed sibling(s)    Quality of Family Relationships unable to assess    Able to Return to Prior Arrangements yes       Resource/Environmental Concerns    Resource/Environmental Concerns none       Transition Planning    Patient/Family Anticipates Transition to home    Transportation Anticipated family or friend will provide       Discharge Needs Assessment    Equipment Currently Used at Home none               Discharge Plan     Row Name 06/07/22 1132       Plan    Plan Pt. admitted 6/6/22. SS received a consult for discharge planning. SS spoke with pt on this date. Pt lives at [address] [place] [address]. Pt's PCP is Jabari Keller. Pt. does not have HH/DME. Pt. utilizes [facility] pharmacy. Pt. does not have a POA, but states she has a will. Pt states she sister will be providing transportation. Home at discharge. SS following.  Simultaneous filing. User may be unaware of other data.    Provided Post Acute Provider List? Yes    Post Acute Provider List DME Supplier    Delivered To Patient    Method of Delivery Telephone  Patient states she has no prefernce of DME company            JANINE Haro

## 2022-06-07 NOTE — DISCHARGE INSTR - ACTIVITY
[facility]  You can reach the office at .
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
## 2022-06-11 LAB
BACTERIA SPEC AEROBE CULT: NORMAL
BACTERIA SPEC AEROBE CULT: NORMAL

## 2022-07-01 ENCOUNTER — OFFICE VISIT (OUTPATIENT)
Dept: PULMONOLOGY | Facility: CLINIC | Age: 82
End: 2022-07-01

## 2022-07-01 ENCOUNTER — PATIENT ROUNDING (BHMG ONLY) (OUTPATIENT)
Dept: PULMONOLOGY | Facility: CLINIC | Age: 82
End: 2022-07-01

## 2022-07-01 VITALS
BODY MASS INDEX: 30.86 KG/M2 | HEIGHT: 66 IN | SYSTOLIC BLOOD PRESSURE: 132 MMHG | OXYGEN SATURATION: 94 % | TEMPERATURE: 97.7 F | HEART RATE: 81 BPM | WEIGHT: 192 LBS | DIASTOLIC BLOOD PRESSURE: 82 MMHG

## 2022-07-01 DIAGNOSIS — G47.10 HYPERSOMNIA: ICD-10-CM

## 2022-07-01 DIAGNOSIS — R06.02 SHORTNESS OF BREATH: ICD-10-CM

## 2022-07-01 DIAGNOSIS — I27.20 PULMONARY HYPERTENSION: Primary | ICD-10-CM

## 2022-07-01 DIAGNOSIS — E66.9 OBESITY (BMI 30-39.9): ICD-10-CM

## 2022-07-01 PROCEDURE — 99203 OFFICE O/P NEW LOW 30 MIN: CPT | Performed by: NURSE PRACTITIONER

## 2022-07-01 RX ORDER — LOVASTATIN 40 MG/1
TABLET ORAL
COMMUNITY
Start: 2022-06-14

## 2022-07-01 NOTE — PROGRESS NOTES
"Chief Complaint  Shortness of Breath and PULMONARY HYPERTENSION    Subjective        Aliyah Adkins presents to [facility] PULMONARY & CRITICAL CARE MEDICINE  History of Present Illness     Ms. Adkins is an 82 year old female with a medical history significant for arthritis, hyperlipidemia, and hypertension.      She presents today for evaluation of shortness of breath and pulmonary hypertension.  She states that she does have some minimal shortness of breath on exertion.  She is currently on no inhalers.  She is a former smoker, quitting in 1990.  She states that she feels like she is doing ok.  She states that she really does not feel that she has any major problems with her lungs.  She attributes her shortness of breath to her age.  She does tell me that she has a sleep study scheduled.          Objective   Vital Signs:  /82 (BP Location: Left arm, Patient Position: Sitting)   Pulse 81   Temp 97.7 °F (36.5 °C)   Ht 167.6 cm (66\")   Wt 87.1 kg (192 lb)   SpO2 94%   BMI 30.99 kg/m²   Estimated body mass index is 30.99 kg/m² as calculated from the following:    Height as of this encounter: 167.6 cm (66\").    Weight as of this encounter: 87.1 kg (192 lb).    BMI is >= 30 and <35. (Class 1 Obesity). The following options were offered after discussion;: exercise counseling/recommendations and nutrition counseling/recommendations      Physical Exam     GENERAL APPEARANCE: Well developed, well nourished, alert and cooperative, and appears to be in no acute distress.    HEAD: normocephalic. Atraumatic.    EYES: PERRL, EOMI. Vision is grossly intact.    THROAT: Oral cavity and pharynx normal. No inflammation, swelling, exudate, or lesions.     NECK: Neck supple.  No thyromegaly.    CARDIAC: Normal S1 and S2. No S3, S4 or murmurs. Rhythm is regular.     RESPIRATORY:Bilateral air entry positive. Bilateral diminished breath sounds. No wheezing, crackles or rhonchi noted.    GI: Positive bowel " sounds. Soft, nondistended, nontender.     MUSCULOSKELETAL: No significant deformity or joint abnormality. No edema. Peripheral pulses intact. No varicosities.    NEUROLOGICAL: Strength and sensation symmetric and intact throughout.     PSYCHIATRIC: The mental examination revealed the patient was oriented to person, place, and time.     Result Review :  The following data was reviewed by: LATONYA Wagner on 07/01/2022:  Common labs    Common Labsle 9/20/21 6/6/22 6/6/22     1128 1128   Glucose   139 (A)   BUN   20   Creatinine 1.10  1.16 (A)   Sodium   134 (A)   Potassium   4.5   Chloride   98   Calcium   9.7   Albumin   4.41   Total Bilirubin   0.5   Alkaline Phosphatase   70   AST (SGOT)   16   ALT (SGPT)   10   WBC  9.80    Hemoglobin  12.4    Hematocrit  37.6    Platelets  274    (A) Abnormal value       Comments are available for some flowsheets but are not being displayed.           Data reviewed: CT chest          Assessment and Plan   Diagnoses and all orders for this visit:    1. Pulmonary hypertension (HCC) (Primary)    2. Shortness of breath    3. Hypersomnia    4. Obesity (BMI 30-39.9)            CT chest was reviewed.  She does not want to do a PFT at this time.  Sleep study has been scheduled.  Results for orders placed during the hospital encounter of 06/06/22    Adult Transthoracic Echo Complete W/ Cont if Necessary Per Protocol    Interpretation Summary  · Left ventricular wall thickness is consistent with mild concentric hypertrophy.  · Left ventricular ejection fraction appears to be 66 - 70%. Left ventricular systolic function is normal.  · Left ventricular diastolic function is consistent with (grade I) impaired relaxation.  · The right ventricular cavity is mildly dilated.  · The right atrial cavity is borderline dilated.  · Estimated right ventricular systolic pressure from tricuspid regurgitation is markedly elevated ( 68 mmHg).      She is following with cardiology.    Follow Up    Return if symptoms worsen or fail to improve.  Patient was given instructions and counseling regarding her condition or for health maintenance advice. Please see specific information pulled into the AVS if appropriate.

## 2022-07-02 NOTE — PROGRESS NOTES
July 1, 2022    Hello, may I speak with Aliyah Adkins?    My name is Angela Cruz      I am  with [facility]ARY [facility]ARE [facility]IC[facility]  [address]  [facility] [address] [phone].    Before we get started may I verify your date of birth? 1940    I am calling to officially welcome you to our practice and ask about your recent visit. Is this a good time to talk? yes    Tell me about your visit with us. What things went well?  Everything went good . Pleased with visit       We're always looking for ways to make our patients' experiences even better. Do you have recommendations on ways we may improve?  no    Overall were you satisfied with your first visit to our practice? yes       I appreciate you taking the time to speak with me today. Is there anything else I can do for you? no      Thank you, and have a great day.

## 2022-09-08 ENCOUNTER — TRANSCRIBE ORDERS (OUTPATIENT)
Dept: ADMINISTRATIVE | Facility: HOSPITAL | Age: 82
End: 2022-09-08

## 2022-09-08 DIAGNOSIS — R10.30 LOWER ABDOMINAL PAIN: ICD-10-CM

## 2022-09-08 DIAGNOSIS — I73.9 PERIPHERAL VASCULAR DISEASE, UNSPECIFIED: Primary | ICD-10-CM

## 2022-09-08 DIAGNOSIS — M79.604 PAIN IN RIGHT LEG: ICD-10-CM

## 2022-10-06 ENCOUNTER — HOSPITAL ENCOUNTER (OUTPATIENT)
Dept: CT IMAGING | Facility: HOSPITAL | Age: 82
Discharge: HOME | End: 2022-10-06

## 2022-10-06 ENCOUNTER — HOSPITAL ENCOUNTER (OUTPATIENT)
Dept: ULTRASOUND IMAGING | Facility: HOSPITAL | Age: 82
Discharge: HOME | End: 2022-10-06

## 2022-10-06 ENCOUNTER — HOSPITAL ENCOUNTER (OUTPATIENT)
Dept: CARDIOLOGY | Facility: HOSPITAL | Age: 82
Discharge: HOME | End: 2022-10-06

## 2022-10-06 DIAGNOSIS — R10.30 LOWER ABDOMINAL PAIN: ICD-10-CM

## 2022-10-06 DIAGNOSIS — M79.604 PAIN IN RIGHT LEG: ICD-10-CM

## 2022-10-06 DIAGNOSIS — I73.9 PERIPHERAL VASCULAR DISEASE, UNSPECIFIED: ICD-10-CM

## 2022-10-06 LAB — CREAT BLDA-MCNC: 1.1 MG/DL (ref 0.6–1.3)

## 2022-10-06 PROCEDURE — 93922 UPR/L XTREMITY ART 2 LEVELS: CPT

## 2022-10-06 PROCEDURE — 74178 CT ABD&PLV WO CNTR FLWD CNTR: CPT | Performed by: RADIOLOGY

## 2022-10-06 PROCEDURE — 93922 UPR/L XTREMITY ART 2 LEVELS: CPT | Performed by: RADIOLOGY

## 2022-10-06 PROCEDURE — 82565 ASSAY OF CREATININE: CPT

## 2022-10-06 PROCEDURE — 25010000002 IOPAMIDOL 61 % SOLUTION: Performed by: INTERNAL MEDICINE

## 2022-10-06 PROCEDURE — 74178 CT ABD&PLV WO CNTR FLWD CNTR: CPT

## 2022-10-06 PROCEDURE — 93970 EXTREMITY STUDY: CPT | Performed by: RADIOLOGY

## 2022-10-06 PROCEDURE — 93970 EXTREMITY STUDY: CPT

## 2022-10-06 RX ADMIN — IOPAMIDOL 100 ML: 612 INJECTION, SOLUTION INTRAVENOUS at 12:34

## 2022-12-08 ENCOUNTER — APPOINTMENT (OUTPATIENT)
Dept: MAMMOGRAPHY | Facility: HOSPITAL | Age: 82
End: 2022-12-08

## 2023-03-23 ENCOUNTER — TRANSCRIBE ORDERS (OUTPATIENT)
Dept: ADMINISTRATIVE | Facility: HOSPITAL | Age: 83
End: 2023-03-23
Payer: MEDICARE

## 2023-03-23 DIAGNOSIS — Z78.0 ASYMPTOMATIC MENOPAUSAL STATE: Primary | ICD-10-CM

## 2023-04-10 ENCOUNTER — HOSPITAL ENCOUNTER (OUTPATIENT)
Dept: BONE DENSITY | Facility: HOSPITAL | Age: 83
Discharge: HOME | End: 2023-04-10
Admitting: INTERNAL MEDICINE
Payer: MEDICARE

## 2023-04-10 DIAGNOSIS — Z78.0 ASYMPTOMATIC MENOPAUSAL STATE: ICD-10-CM

## 2023-04-10 PROCEDURE — 77080 DXA BONE DENSITY AXIAL: CPT

## 2023-04-10 PROCEDURE — 77080 DXA BONE DENSITY AXIAL: CPT | Performed by: RADIOLOGY

## 2023-08-14 ENCOUNTER — TRANSCRIBE ORDERS (OUTPATIENT)
Dept: ADMINISTRATIVE | Facility: HOSPITAL | Age: 83
End: 2023-08-14
Payer: MEDICARE

## 2023-08-14 DIAGNOSIS — I50.22 CHRONIC SYSTOLIC CONGESTIVE HEART FAILURE: ICD-10-CM

## 2023-08-14 DIAGNOSIS — J44.9 COPD MIXED TYPE: Primary | ICD-10-CM

## 2023-08-29 ENCOUNTER — HOSPITAL ENCOUNTER (OUTPATIENT)
Dept: RESPIRATORY THERAPY | Facility: HOSPITAL | Age: 83
Discharge: HOME | End: 2023-08-29
Payer: MEDICARE

## 2023-08-29 ENCOUNTER — HOSPITAL ENCOUNTER (OUTPATIENT)
Dept: CARDIOLOGY | Facility: HOSPITAL | Age: 83
Discharge: HOME | End: 2023-08-29
Payer: MEDICARE

## 2023-08-29 DIAGNOSIS — I50.22 CHRONIC SYSTOLIC CONGESTIVE HEART FAILURE: ICD-10-CM

## 2023-08-29 DIAGNOSIS — J44.9 COPD MIXED TYPE: ICD-10-CM

## 2023-08-29 LAB
BH CV ECHO MEAS - ACS: 1.7 CM
BH CV ECHO MEAS - AO MAX PG: 8.6 MMHG
BH CV ECHO MEAS - AO MEAN PG: 5 MMHG
BH CV ECHO MEAS - AO ROOT DIAM: 3 CM
BH CV ECHO MEAS - AO V2 MAX: 147 CM/SEC
BH CV ECHO MEAS - AO V2 VTI: 28.9 CM
BH CV ECHO MEAS - EDV(CUBED): 124.6 ML
BH CV ECHO MEAS - EDV(MOD-SP4): 70.7 ML
BH CV ECHO MEAS - EF(MOD-SP4): 57.9 %
BH CV ECHO MEAS - ESV(CUBED): 44.9 ML
BH CV ECHO MEAS - ESV(MOD-SP4): 29.8 ML
BH CV ECHO MEAS - FS: 28.8 %
BH CV ECHO MEAS - IVS/LVPW: 1.14 CM
BH CV ECHO MEAS - IVSD: 1.15 CM
BH CV ECHO MEAS - LA DIMENSION: 3.3 CM
BH CV ECHO MEAS - LAT PEAK E' VEL: 9.8 CM/SEC
BH CV ECHO MEAS - LV DIASTOLIC VOL/BSA (35-75): 36 CM2
BH CV ECHO MEAS - LV MASS(C)D: 201.8 GRAMS
BH CV ECHO MEAS - LV SYSTOLIC VOL/BSA (12-30): 15.2 CM2
BH CV ECHO MEAS - LVIDD: 5 CM
BH CV ECHO MEAS - LVIDS: 3.6 CM
BH CV ECHO MEAS - LVOT AREA: 3.1 CM2
BH CV ECHO MEAS - LVOT DIAM: 2 CM
BH CV ECHO MEAS - LVPWD: 1.01 CM
BH CV ECHO MEAS - MED PEAK E' VEL: 6.1 CM/SEC
BH CV ECHO MEAS - MR MAX PG: 139.2 MMHG
BH CV ECHO MEAS - MR MAX VEL: 590 CM/SEC
BH CV ECHO MEAS - MR MEAN PG: 85 MMHG
BH CV ECHO MEAS - MR MEAN VEL: 441 CM/SEC
BH CV ECHO MEAS - MR VTI: 204 CM
BH CV ECHO MEAS - MV A MAX VEL: 105 CM/SEC
BH CV ECHO MEAS - MV E MAX VEL: 97.3 CM/SEC
BH CV ECHO MEAS - MV E/A: 0.93
BH CV ECHO MEAS - PA ACC TIME: 0.06 SEC
BH CV ECHO MEAS - RAP SYSTOLE: 10 MMHG
BH CV ECHO MEAS - RVSP: 69.3 MMHG
BH CV ECHO MEAS - SI(MOD-SP4): 20.8 ML/M2
BH CV ECHO MEAS - SV(MOD-SP4): 40.9 ML
BH CV ECHO MEAS - TAPSE (>1.6): 2.24 CM
BH CV ECHO MEAS - TR MAX PG: 59.3 MMHG
BH CV ECHO MEAS - TR MAX VEL: 385 CM/SEC
BH CV ECHO MEASUREMENTS AVERAGE E/E' RATIO: 12.24
LEFT ATRIUM VOLUME INDEX: 14.9 ML/M2

## 2023-08-29 PROCEDURE — 93306 TTE W/DOPPLER COMPLETE: CPT

## 2023-08-29 RX ORDER — ALBUTEROL SULFATE 2.5 MG/3ML
2.5 SOLUTION RESPIRATORY (INHALATION) ONCE
Status: DISCONTINUED | OUTPATIENT
Start: 2023-08-29 | End: 2023-08-30 | Stop reason: HOSPADM

## 2023-08-29 NOTE — PROGRESS NOTES
Patient unable to perform PFT at this time. PFT maneuvers attempted numerous times. Patient hyperventilated with each attempt and was not able to exhale for 6 second. Dr. Keller's office notified. I spoke with Shelly

## 2024-05-21 ENCOUNTER — TRANSCRIBE ORDERS (OUTPATIENT)
Dept: ADMINISTRATIVE | Facility: HOSPITAL | Age: 84
End: 2024-05-21
Payer: MEDICARE

## 2024-05-21 DIAGNOSIS — J84.9 INTERSTITIAL LUNG DISEASE: Primary | ICD-10-CM

## 2024-05-25 ENCOUNTER — APPOINTMENT (OUTPATIENT)
Dept: GENERAL RADIOLOGY | Facility: HOSPITAL | Age: 84
End: 2024-05-25
Payer: MEDICARE

## 2024-05-25 ENCOUNTER — APPOINTMENT (OUTPATIENT)
Dept: ULTRASOUND IMAGING | Facility: HOSPITAL | Age: 84
End: 2024-05-25
Payer: MEDICARE

## 2024-05-25 ENCOUNTER — HOSPITAL ENCOUNTER (INPATIENT)
Facility: HOSPITAL | Age: 84
LOS: 5 days | Discharge: HOME | End: 2024-05-30
Attending: STUDENT IN AN ORGANIZED HEALTH CARE EDUCATION/TRAINING PROGRAM | Admitting: INTERNAL MEDICINE
Payer: MEDICARE

## 2024-05-25 ENCOUNTER — APPOINTMENT (OUTPATIENT)
Dept: CT IMAGING | Facility: HOSPITAL | Age: 84
End: 2024-05-25
Payer: MEDICARE

## 2024-05-25 DIAGNOSIS — N28.9 RENAL INSUFFICIENCY: ICD-10-CM

## 2024-05-25 DIAGNOSIS — I50.31 ACUTE DIASTOLIC CONGESTIVE HEART FAILURE: ICD-10-CM

## 2024-05-25 DIAGNOSIS — I25.10 CORONARY ARTERY DISEASE INVOLVING NATIVE CORONARY ARTERY OF NATIVE HEART, UNSPECIFIED WHETHER ANGINA PRESENT: ICD-10-CM

## 2024-05-25 DIAGNOSIS — I25.118 CORONARY ARTERY DISEASE INVOLVING NATIVE CORONARY ARTERY OF NATIVE HEART WITH OTHER FORM OF ANGINA PECTORIS: ICD-10-CM

## 2024-05-25 DIAGNOSIS — I50.9 ACUTE CONGESTIVE HEART FAILURE, UNSPECIFIED HEART FAILURE TYPE: ICD-10-CM

## 2024-05-25 DIAGNOSIS — E87.29 RESPIRATORY ACIDOSIS: Primary | ICD-10-CM

## 2024-05-25 LAB
A-A DO2: 77.3 MMHG (ref 0–300)
A-A DO2: 83.4 MMHG (ref 0–300)
ALBUMIN SERPL-MCNC: 4.4 G/DL (ref 3.5–5.2)
ALBUMIN/GLOB SERPL: 1.6 G/DL
ALP SERPL-CCNC: 72 U/L (ref 39–117)
ALT SERPL W P-5'-P-CCNC: 34 U/L (ref 1–33)
ANION GAP SERPL CALCULATED.3IONS-SCNC: 12.5 MMOL/L (ref 5–15)
ANISOCYTOSIS BLD QL: NORMAL
ARTERIAL PATENCY WRIST A: ABNORMAL
ARTERIAL PATENCY WRIST A: ABNORMAL
AST SERPL-CCNC: 39 U/L (ref 1–32)
ATMOSPHERIC PRESS: 723 MMHG
ATMOSPHERIC PRESS: 724 MMHG
BASE EXCESS BLDA CALC-SCNC: -0.5 MMOL/L (ref 0–2)
BASE EXCESS BLDA CALC-SCNC: 2 MMOL/L (ref 0–2)
BASOPHILS # BLD AUTO: 0.13 10*3/MM3 (ref 0–0.2)
BASOPHILS NFR BLD AUTO: 1.4 % (ref 0–1.5)
BDY SITE: ABNORMAL
BDY SITE: ABNORMAL
BILIRUB SERPL-MCNC: 1 MG/DL (ref 0–1.2)
BUN SERPL-MCNC: 31 MG/DL (ref 8–23)
BUN/CREAT SERPL: 15.6 (ref 7–25)
CALCIUM SPEC-SCNC: 9 MG/DL (ref 8.6–10.5)
CHLORIDE SERPL-SCNC: 108 MMOL/L (ref 98–107)
CO2 BLDA-SCNC: 29.2 MMOL/L (ref 22–33)
CO2 BLDA-SCNC: 30.6 MMOL/L (ref 22–33)
CO2 SERPL-SCNC: 24.5 MMOL/L (ref 22–29)
COHGB MFR BLD: 2.8 % (ref 0–5)
COHGB MFR BLD: 2.8 % (ref 0–5)
CREAT SERPL-MCNC: 1.99 MG/DL (ref 0.57–1)
D-LACTATE SERPL-SCNC: 1.8 MMOL/L (ref 0.5–2)
DEPRECATED RDW RBC AUTO: 93.2 FL (ref 37–54)
EGFRCR SERPLBLD CKD-EPI 2021: 24.7 ML/MIN/1.73
EOSINOPHIL # BLD AUTO: 0.43 10*3/MM3 (ref 0–0.4)
EOSINOPHIL NFR BLD AUTO: 4.5 % (ref 0.3–6.2)
EPAP: 6
ERYTHROCYTE [DISTWIDTH] IN BLOOD BY AUTOMATED COUNT: 21.8 % (ref 12.3–15.4)
FLUAV RNA RESP QL NAA+PROBE: NOT DETECTED
FLUBV RNA RESP QL NAA+PROBE: NOT DETECTED
GAS FLOW AIRWAY: 3 LPM
GLOBULIN UR ELPH-MCNC: 2.7 GM/DL
GLUCOSE SERPL-MCNC: 174 MG/DL (ref 65–99)
HCO3 BLDA-SCNC: 27.3 MMOL/L (ref 20–26)
HCO3 BLDA-SCNC: 28.9 MMOL/L (ref 20–26)
HCT VFR BLD AUTO: 35.2 % (ref 34–46.6)
HCT VFR BLD CALC: 33 % (ref 38–51)
HCT VFR BLD CALC: 35.2 % (ref 38–51)
HGB BLD-MCNC: 11 G/DL (ref 12–15.9)
HGB BLDA-MCNC: 10.8 G/DL (ref 13.5–17.5)
HGB BLDA-MCNC: 11.5 G/DL (ref 13.5–17.5)
HOLD SPECIMEN: NORMAL
HOLD SPECIMEN: NORMAL
HYPOCHROMIA BLD QL: NORMAL
IMM GRANULOCYTES # BLD AUTO: 0.11 10*3/MM3 (ref 0–0.05)
IMM GRANULOCYTES NFR BLD AUTO: 1.1 % (ref 0–0.5)
INHALED O2 CONCENTRATION: 32 %
INHALED O2 CONCENTRATION: 32 %
IPAP: 16
LYMPHOCYTES # BLD AUTO: 1.46 10*3/MM3 (ref 0.7–3.1)
LYMPHOCYTES NFR BLD AUTO: 15.2 % (ref 19.6–45.3)
Lab: ABNORMAL
MACROCYTES BLD QL SMEAR: NORMAL
MCH RBC QN AUTO: 36.1 PG (ref 26.6–33)
MCHC RBC AUTO-ENTMCNC: 31.3 G/DL (ref 31.5–35.7)
MCV RBC AUTO: 115.4 FL (ref 79–97)
METHGB BLD QL: 0 % (ref 0–3)
METHGB BLD QL: 0.1 % (ref 0–3)
MODALITY: ABNORMAL
MODALITY: ABNORMAL
MONOCYTES # BLD AUTO: 0.56 10*3/MM3 (ref 0.1–0.9)
MONOCYTES NFR BLD AUTO: 5.8 % (ref 5–12)
NEUTROPHILS NFR BLD AUTO: 6.92 10*3/MM3 (ref 1.7–7)
NEUTROPHILS NFR BLD AUTO: 72 % (ref 42.7–76)
NOTIFIED BY: ABNORMAL
NOTIFIED WHO: ABNORMAL
NOTIFIED WHO: ABNORMAL
NRBC BLD AUTO-RTO: 1 /100 WBC (ref 0–0.2)
NT-PROBNP SERPL-MCNC: 2327 PG/ML (ref 0–1800)
OXYHGB MFR BLDV: 87 % (ref 94–99)
OXYHGB MFR BLDV: 92 % (ref 94–99)
PCO2 BLDA: 55 MM HG (ref 35–45)
PCO2 BLDA: 60.4 MM HG (ref 35–45)
PCO2 TEMP ADJ BLD: ABNORMAL MM[HG]
PCO2 TEMP ADJ BLD: ABNORMAL MM[HG]
PH BLDA: 7.26 PH UNITS (ref 7.35–7.45)
PH BLDA: 7.33 PH UNITS (ref 7.35–7.45)
PH, TEMP CORRECTED: ABNORMAL
PH, TEMP CORRECTED: ABNORMAL
PLAT MORPH BLD: NORMAL
PLATELET # BLD AUTO: 303 10*3/MM3 (ref 140–450)
PMV BLD AUTO: 10.4 FL (ref 6–12)
PO2 BLDA: 66.3 MM HG (ref 83–108)
PO2 BLDA: 78 MM HG (ref 83–108)
PO2 TEMP ADJ BLD: ABNORMAL MM[HG]
PO2 TEMP ADJ BLD: ABNORMAL MM[HG]
POTASSIUM SERPL-SCNC: 4.8 MMOL/L (ref 3.5–5.2)
PROCALCITONIN SERPL-MCNC: 0.09 NG/ML (ref 0–0.25)
PROT SERPL-MCNC: 7.1 G/DL (ref 6–8.5)
QT INTERVAL: 358 MS
QTC INTERVAL: 399 MS
RBC # BLD AUTO: 3.05 10*6/MM3 (ref 3.77–5.28)
SAO2 % BLDCOA: 89.4 % (ref 94–99)
SAO2 % BLDCOA: 94.7 % (ref 94–99)
SARS-COV-2 RNA RESP QL NAA+PROBE: NOT DETECTED
SET MECH RESP RATE: 20
SODIUM SERPL-SCNC: 145 MMOL/L (ref 136–145)
STOMATOCYTES BLD QL SMEAR: NORMAL
TROPONIN T SERPL HS-MCNC: 24 NG/L
VENTILATOR MODE: ABNORMAL
VENTILATOR MODE: ABNORMAL
WBC NRBC COR # BLD AUTO: 9.61 10*3/MM3 (ref 3.4–10.8)
WHOLE BLOOD HOLD COAG: NORMAL
WHOLE BLOOD HOLD SPECIMEN: NORMAL

## 2024-05-25 PROCEDURE — 85007 BL SMEAR W/DIFF WBC COUNT: CPT | Performed by: STUDENT IN AN ORGANIZED HEALTH CARE EDUCATION/TRAINING PROGRAM

## 2024-05-25 PROCEDURE — 82607 VITAMIN B-12: CPT | Performed by: INTERNAL MEDICINE

## 2024-05-25 PROCEDURE — 71250 CT THORAX DX C-: CPT

## 2024-05-25 PROCEDURE — 71046 X-RAY EXAM CHEST 2 VIEWS: CPT

## 2024-05-25 PROCEDURE — 87636 SARSCOV2 & INF A&B AMP PRB: CPT | Performed by: PHYSICIAN ASSISTANT

## 2024-05-25 PROCEDURE — 84484 ASSAY OF TROPONIN QUANT: CPT | Performed by: STUDENT IN AN ORGANIZED HEALTH CARE EDUCATION/TRAINING PROGRAM

## 2024-05-25 PROCEDURE — 93010 ELECTROCARDIOGRAM REPORT: CPT | Performed by: INTERNAL MEDICINE

## 2024-05-25 PROCEDURE — 25010000002 BUMETANIDE PER 0.5 MG: Performed by: PHYSICIAN ASSISTANT

## 2024-05-25 PROCEDURE — 94660 CPAP INITIATION&MGMT: CPT

## 2024-05-25 PROCEDURE — 82805 BLOOD GASES W/O2 SATURATION: CPT

## 2024-05-25 PROCEDURE — 83880 ASSAY OF NATRIURETIC PEPTIDE: CPT | Performed by: STUDENT IN AN ORGANIZED HEALTH CARE EDUCATION/TRAINING PROGRAM

## 2024-05-25 PROCEDURE — 83050 HGB METHEMOGLOBIN QUAN: CPT

## 2024-05-25 PROCEDURE — 94799 UNLISTED PULMONARY SVC/PX: CPT

## 2024-05-25 PROCEDURE — 82375 ASSAY CARBOXYHB QUANT: CPT

## 2024-05-25 PROCEDURE — 99223 1ST HOSP IP/OBS HIGH 75: CPT | Performed by: INTERNAL MEDICINE

## 2024-05-25 PROCEDURE — 76775 US EXAM ABDO BACK WALL LIM: CPT | Performed by: RADIOLOGY

## 2024-05-25 PROCEDURE — 84145 PROCALCITONIN (PCT): CPT | Performed by: PHYSICIAN ASSISTANT

## 2024-05-25 PROCEDURE — 36415 COLL VENOUS BLD VENIPUNCTURE: CPT

## 2024-05-25 PROCEDURE — 71250 CT THORAX DX C-: CPT | Performed by: RADIOLOGY

## 2024-05-25 PROCEDURE — 80053 COMPREHEN METABOLIC PANEL: CPT | Performed by: STUDENT IN AN ORGANIZED HEALTH CARE EDUCATION/TRAINING PROGRAM

## 2024-05-25 PROCEDURE — 93005 ELECTROCARDIOGRAM TRACING: CPT | Performed by: STUDENT IN AN ORGANIZED HEALTH CARE EDUCATION/TRAINING PROGRAM

## 2024-05-25 PROCEDURE — 94761 N-INVAS EAR/PLS OXIMETRY MLT: CPT

## 2024-05-25 PROCEDURE — 76775 US EXAM ABDO BACK WALL LIM: CPT

## 2024-05-25 PROCEDURE — 99285 EMERGENCY DEPT VISIT HI MDM: CPT

## 2024-05-25 PROCEDURE — 25010000002 ENOXAPARIN PER 10 MG

## 2024-05-25 PROCEDURE — 83605 ASSAY OF LACTIC ACID: CPT | Performed by: PHYSICIAN ASSISTANT

## 2024-05-25 PROCEDURE — 82746 ASSAY OF FOLIC ACID SERUM: CPT | Performed by: INTERNAL MEDICINE

## 2024-05-25 PROCEDURE — 85025 COMPLETE CBC W/AUTO DIFF WBC: CPT | Performed by: STUDENT IN AN ORGANIZED HEALTH CARE EDUCATION/TRAINING PROGRAM

## 2024-05-25 PROCEDURE — 36600 WITHDRAWAL OF ARTERIAL BLOOD: CPT

## 2024-05-25 PROCEDURE — 71046 X-RAY EXAM CHEST 2 VIEWS: CPT | Performed by: RADIOLOGY

## 2024-05-25 RX ORDER — SODIUM CHLORIDE 9 MG/ML
40 INJECTION, SOLUTION INTRAVENOUS AS NEEDED
Status: DISCONTINUED | OUTPATIENT
Start: 2024-05-25 | End: 2024-05-30 | Stop reason: HOSPADM

## 2024-05-25 RX ORDER — SODIUM CHLORIDE 0.9 % (FLUSH) 0.9 %
10 SYRINGE (ML) INJECTION AS NEEDED
Status: DISCONTINUED | OUTPATIENT
Start: 2024-05-25 | End: 2024-05-30 | Stop reason: HOSPADM

## 2024-05-25 RX ORDER — SODIUM CHLORIDE 0.9 % (FLUSH) 0.9 %
10 SYRINGE (ML) INJECTION EVERY 12 HOURS SCHEDULED
Status: DISCONTINUED | OUTPATIENT
Start: 2024-05-25 | End: 2024-05-30 | Stop reason: HOSPADM

## 2024-05-25 RX ORDER — BUMETANIDE 0.25 MG/ML
2 INJECTION INTRAMUSCULAR; INTRAVENOUS ONCE
Status: COMPLETED | OUTPATIENT
Start: 2024-05-25 | End: 2024-05-25

## 2024-05-25 RX ORDER — METOPROLOL TARTRATE 50 MG/1
50 TABLET, FILM COATED ORAL 2 TIMES DAILY
Status: DISCONTINUED | OUTPATIENT
Start: 2024-05-25 | End: 2024-05-26

## 2024-05-25 RX ORDER — AMOXICILLIN 250 MG
2 CAPSULE ORAL 2 TIMES DAILY
Status: DISCONTINUED | OUTPATIENT
Start: 2024-05-25 | End: 2024-05-30 | Stop reason: HOSPADM

## 2024-05-25 RX ORDER — NITROGLYCERIN 0.4 MG/1
0.4 TABLET SUBLINGUAL
Status: DISCONTINUED | OUTPATIENT
Start: 2024-05-25 | End: 2024-05-30 | Stop reason: HOSPADM

## 2024-05-25 RX ORDER — ACETAMINOPHEN 325 MG/1
650 TABLET ORAL EVERY 4 HOURS PRN
Status: DISCONTINUED | OUTPATIENT
Start: 2024-05-25 | End: 2024-05-29 | Stop reason: SDUPTHER

## 2024-05-25 RX ORDER — BISACODYL 5 MG/1
5 TABLET, DELAYED RELEASE ORAL DAILY PRN
Status: DISCONTINUED | OUTPATIENT
Start: 2024-05-25 | End: 2024-05-30 | Stop reason: HOSPADM

## 2024-05-25 RX ORDER — CALCIUM CARBONATE 500 MG/1
2 TABLET, CHEWABLE ORAL 2 TIMES DAILY PRN
Status: DISCONTINUED | OUTPATIENT
Start: 2024-05-25 | End: 2024-05-30 | Stop reason: HOSPADM

## 2024-05-25 RX ORDER — OXAZEPAM 15 MG/1
15 CAPSULE ORAL 2 TIMES DAILY
Status: CANCELLED | OUTPATIENT
Start: 2024-05-25

## 2024-05-25 RX ORDER — POLYETHYLENE GLYCOL 3350 17 G/17G
17 POWDER, FOR SOLUTION ORAL DAILY PRN
Status: DISCONTINUED | OUTPATIENT
Start: 2024-05-25 | End: 2024-05-30 | Stop reason: HOSPADM

## 2024-05-25 RX ORDER — GABAPENTIN 100 MG/1
100 CAPSULE ORAL 2 TIMES DAILY
Status: DISCONTINUED | OUTPATIENT
Start: 2024-05-25 | End: 2024-05-30 | Stop reason: HOSPADM

## 2024-05-25 RX ORDER — ENOXAPARIN SODIUM 100 MG/ML
30 INJECTION SUBCUTANEOUS NIGHTLY
Status: DISCONTINUED | OUTPATIENT
Start: 2024-05-25 | End: 2024-05-26

## 2024-05-25 RX ORDER — OXAZEPAM 15 MG/1
15 CAPSULE ORAL 2 TIMES DAILY
Status: DISCONTINUED | OUTPATIENT
Start: 2024-05-25 | End: 2024-05-30 | Stop reason: HOSPADM

## 2024-05-25 RX ORDER — LOSARTAN POTASSIUM 25 MG/1
100 TABLET ORAL DAILY
Status: CANCELLED | OUTPATIENT
Start: 2024-05-25

## 2024-05-25 RX ORDER — CELECOXIB 100 MG/1
100 CAPSULE ORAL 2 TIMES DAILY PRN
Status: CANCELLED | OUTPATIENT
Start: 2024-05-25

## 2024-05-25 RX ORDER — GABAPENTIN 100 MG/1
100 CAPSULE ORAL 2 TIMES DAILY
COMMUNITY

## 2024-05-25 RX ORDER — BISACODYL 10 MG
10 SUPPOSITORY, RECTAL RECTAL DAILY PRN
Status: DISCONTINUED | OUTPATIENT
Start: 2024-05-25 | End: 2024-05-30 | Stop reason: HOSPADM

## 2024-05-25 RX ADMIN — ENOXAPARIN SODIUM 30 MG: 30 INJECTION SUBCUTANEOUS at 21:05

## 2024-05-25 RX ADMIN — METOPROLOL TARTRATE 50 MG: 50 TABLET, FILM COATED ORAL at 21:05

## 2024-05-25 RX ADMIN — GABAPENTIN 100 MG: 100 CAPSULE ORAL at 21:05

## 2024-05-25 RX ADMIN — BUMETANIDE 2 MG: 0.25 INJECTION INTRAMUSCULAR; INTRAVENOUS at 12:18

## 2024-05-25 RX ADMIN — DOCUSATE SODIUM 50 MG AND SENNOSIDES 8.6 MG 2 TABLET: 8.6; 5 TABLET, FILM COATED ORAL at 21:05

## 2024-05-25 NOTE — H&P
[facility] HOSPITALIST HISTORY AND PHYSICAL      Admit Date: 2024       Chief complaint Shortness of breath    Subjective     Aliyah Adkins is a 84 y.o. female who presented to the ED at [facility] with CC of shortness of breath. She reports progressively worsening dyspnea over the last 3 weeks. Reports worsening dyspnea on exertion. Denies significant orthopnea. Reports she has noticed increased swelling in her face and abdomen. States she does not weigh herself regularly but reports at least a 5 lb weight gain. No reported CP or palpitations. No reported fever or chills. Denies productive cough. States she has been started on supplemental O2 within the last 6 months and wears 3L's at baseline.     In the ED, she was afebrile and hemodynamically stable. ABG revealed acute hypercapnic respiratory failure. EKG revealed sinus rhythm. Initial troponin mildly elevated at 24. proBNP elevated. Routine labs revealed elevated Cr, mild transaminitis, normal WBC, normal lactate and normal procal. CXR revealed bilateral interstitial infiltrates which could be inflammatory/infectious in origin or congestive. CT chest without contrast revealed extensive interstitial changes, groundglass opacities and infiltrates with findings possibly inflammatory/infectious or related to CHF in background of interstitial chronic changes. She was given a dose of IV Bumex. She is being admitted as telemetry status.       History  Past Medical History:   Diagnosis Date    Arthritis     Hyperlipidemia     Hypertension      History reviewed. No pertinent surgical history.  Family History   Problem Relation Age of Onset    Heart failure Mother      Social History     Tobacco Use    Smoking status: Former     Current packs/day: 0.00     Average packs/day: 1 pack/day for 26.0 years (26.0 ttl pk-yrs)     Types: Cigarettes     Start date:      Quit date:      Years since quittin.4    Smokeless tobacco: Never   Vaping Use     Vaping status: Never Used   Substance Use Topics    Alcohol use: Never    Drug use: Never     (Not in a hospital admission)    Allergies:  Patient has no known allergies.      Review of Systems    Review of Systems   Constitutional:  Positive for activity change and unexpected weight change. Negative for chills and fever.   HENT:  Positive for facial swelling. Negative for nosebleeds and trouble swallowing.    Eyes:  Negative for pain and visual disturbance.   Respiratory:  Positive for shortness of breath. Negative for choking.    Cardiovascular:  Positive for leg swelling. Negative for chest pain and palpitations.   Gastrointestinal:  Positive for abdominal distention. Negative for diarrhea, nausea and vomiting.   Endocrine: Negative for polydipsia, polyphagia and polyuria.   Genitourinary:  Negative for difficulty urinating, dysuria and hematuria.   Musculoskeletal:  Negative for neck pain and neck stiffness.   Neurological:  Negative for dizziness, seizures, syncope and light-headedness.   Psychiatric/Behavioral:  Negative for agitation and confusion.         Objective     Vital Signs  Temp:  [98.2 °F (36.8 °C)] 98.2 °F (36.8 °C)  Heart Rate:  [69-83] 74  Resp:  [16] 16  BP: (125-168)/(56-79) 147/76    Physical Exam: Pt seen and examined in the ED with ANDRE Eller and pt's sister present.   General:    Awake, alert, in no acute distress, chronically ill appearing    Head:    Normocephalic, atraumatic   Eyes:           PERRLA, EOMI. Conjunctivae and sclerae normal. No icterus or pallor.   Ears:    Ears appear intact with no abnormalities noted.   Throat:   No oral lesions or thrush. Oral mucosa moist   Heart:      Normal S1 and S2. Regular rate and rhythm. No significant murmur, rubs or gallops appreciated.   Lungs:     Respirations regular, even and unlabored. Diminished breath sounds throughout. No rhonchi appreciated.    Abdomen:   Soft and nontender. No guarding, rebound tenderness or  organomegaly noted.  Bowel sounds present x 4.   MS:   Muscular strength intact and equal B/L. No joint swelling, deformity, or tenderness.   Extremities:  Tr pedal edema. Moves UE and LE equally B/L.   Pulses:   Pulses palpable and equal bilaterally.   Skin:   No bleeding, bruising or rash.   Lymph nodes:   No palpable adenopathy   Neurologic:   Cranial nerves 2 - 12 grossly intact. Sensation intact.        Results Review:     I reviewed the patient's new imaging results and agree with the interpretation.  I reviewed the patient's other test results and agree with the interpretation.    Results from last 7 days   Lab Units 05/25/24  0854   WBC 10*3/mm3 9.61   HEMOGLOBIN g/dL 11.0*   PLATELETS 10*3/mm3 303     Results from last 7 days   Lab Units 05/25/24  0854   SODIUM mmol/L 145   POTASSIUM mmol/L 4.8   CHLORIDE mmol/L 108*   CO2 mmol/L 24.5   BUN mg/dL 31*   CREATININE mg/dL 1.99*   CALCIUM mg/dL 9.0   GLUCOSE mg/dL 174*     Results from last 7 days   Lab Units 05/25/24  0854   BILIRUBIN mg/dL 1.0   ALK PHOS U/L 72   AST (SGOT) U/L 39*   ALT (SGPT) U/L 34*             Results from last 7 days   Lab Units 05/25/24  0854   HSTROP T ng/L 24*       Imaging Results (Last 24 Hours)       Procedure Component Value Units Date/Time    CT Chest Without Contrast Diagnostic [[id number]] Collected: 05/25/24 1247     Updated: 05/25/24 1253    Narrative:      VERIFICATION OBSERVER NAME: Alexander Chavez MD.        Technique: Axial images were obtained along with coronal and sagittal  reconstruction. DLP in mGycm reported in the EMR records. Dose lowering  technique: Automated exposure control. Data included in the medical  records.      HISTORY/COMPARISON/FINDINGS:     Comparison: 7/21/2023     History and Findings: Shortness of breath     Mediastinal adenopathy noted.  Findings also suspected for bilateral  adenopathy.  Unchanged from prior.  Likely reactive.  Minimal cardiac enlargement.  No pericardial effusion.  Small bilateral  pleural  effusion.  Visualized portion of the upper abdomen appeared unremarkable.  Extensive bilateral infiltrates, interstitial changes and groundglass  opacities.  Some of the interstitial changes also chronic in nature.  Confluent centrilobular emphysema.       Impression:      Extensive interstitial changes, groundglass opacities and infiltrates.   Findings could be inflammatory/infectious in origin or related to CHF.   Background of interstitial chronic changes              [id number]     ZIP Code [id number].                       This report was finalized on 5/25/2024 12:51 PM by Dr. Alexander Chavez MD.       XR Chest 2 View [[id number]] Collected: 05/25/24 0926     Updated: 05/25/24 0929    Narrative:      VERIFICATION OBSERVER NAME: Alexander Chavez MD.     TECHNIQUE, HISTORY, FINDINGS:      Comparison: None.  .     History and Findings: Shortness of breath.     2 views were obtained.  Elevated RIGHT hemidiaphragm.  Moderate cardiac enlargement.  Bilateral interstitial infiltrates which could be  inflammatory/infectious in origin or congestive.   Central airways are patent.   No pneumothorax or pleural effusion.   No acute osseous abnormality.       Impression:         Bilateral interstitial infiltrates which could be  inflammatory/infectious in origin or congestive..                    [id number]     Zip code: [id number]                 This report was finalized on 5/25/2024 9:27 AM by Dr. Alexander Chavez MD.                   Assessment & Plan   Acute on chronic diastolic CHF: Echo in 8/23 revealed an EF of 61-65%, borderline LVH, grade I diastolic dysfunction and severe pulmonary HTN. Initiate HF pathway. Order echo. Monitor response to IV Bumex given in the ED. Monitor volume status.     Acute hypercapnic with chronic hypoxic respiratory failure: Likely 2/2 above. Diuresis as above. Will monitor off antibiotics as clinical presentation appears more consistent with HF with normal inflammatory markers. Initiated BiPAP in the ED with  subsequent clinical improvement and improving follow up ABG. Cont BiPAP PRN and at HS.     ADENIKE on CKD IIIa: Order UA and renal US. Avoid any potentially nephrotoxic agents. Monitor response to Bumex given in the ED. Monitor UOP and repeat labs in the AM.     Mild troponin elevation: Likely 2/2 HF and ADENIKE. Repeat troponin. Echo ordered as above. Monitor on telemetry.     Essential HTN: BP mildly elevated but overall stable. Review home medication regimen once confirmed by pharmacy. Cont to monitor.     Goals of Care: Discussed with patient and she wishes to be DNR/DNI.     DVT PPX: Lovenox      I discussed the patient's findings and my recommendations with patient, family, and nursing staff.       Mika Steen DO  05/25/24  13:40 EDT

## 2024-05-25 NOTE — PLAN OF CARE
Problem: Adjustment to Illness (Heart Failure)  Goal: Optimal Coping  Outcome: Ongoing, Progressing     Problem: Cardiac Output Decreased (Heart Failure)  Goal: Optimal Cardiac Output  Outcome: Ongoing, Progressing     Problem: Dysrhythmia (Heart Failure)  Goal: Stable Heart Rate and Rhythm  Outcome: Ongoing, Progressing     Problem: Fluid Imbalance (Heart Failure)  Goal: Fluid Balance  Outcome: Ongoing, Progressing     Problem: Functional Ability Impaired (Heart Failure)  Goal: Optimal Functional Ability  Outcome: Ongoing, Progressing     Problem: Oral Intake Inadequate (Heart Failure)  Goal: Optimal Nutrition Intake  Outcome: Ongoing, Progressing     Problem: Respiratory Compromise (Heart Failure)  Goal: Effective Oxygenation and Ventilation  Outcome: Ongoing, Progressing     Problem: Sleep Disordered Breathing (Heart Failure)  Goal: Effective Breathing Pattern During Sleep  Outcome: Ongoing, Progressing     Problem: Noninvasive Ventilation Acute  Goal: Effective Unassisted Ventilation and Oxygenation  Outcome: Ongoing, Progressing   Goal Outcome Evaluation:

## 2024-05-25 NOTE — Clinical Note
Hemostasis started on the right radial artery. R-Band was used in achieving hemostasis. Radial compression device applied to vessel. Hemostasis achieved successfully. Closure device additional comment: 15 ML AIR

## 2024-05-25 NOTE — CASE MANAGEMENT/SOCIAL WORK
Discharge Planning Assessment  [facility]     Patient Name: Aliyah Adkins  MRN: [id number]  Today's Date: 5/25/2024    Admit Date: 5/25/2024    Plan: Pt lives at home alone and plans to return home at discharge will drive self or has family to help if needed. Pcp is Dr Keller, she uses [facility] pharmacy and has [employer] and Medicare a+b. Pt is independent with adl's and use o2 at 3 liters at home from [employer]. Pt does not use any other dme or home health.   Discharge Needs Assessment       Row Name 05/25/24 1516       Living Environment    People in Home alone    Current Living Arrangements home    Primary Care Provided by [facility]    Quality of Family Relationships helpful;involved;supportive    Able to Return to Prior Arrangements yes       Resource/Environmental Concerns    Resource/Environmental Concerns none       Transition Planning    Patient/Family Anticipates Transition to home    Patient/Family Anticipated Services at Transition none    Transportation Anticipated car, drives self;family or friend will provide       Discharge Needs Assessment    Readmission Within the Last 30 Days no previous admission in last 30 days    Equipment Currently Used at Home oxygen    Concerns to be Addressed no discharge needs identified;denies needs/concerns at this time    Anticipated Changes Related to Illness none    Equipment Needed After Discharge none                   Discharge Plan       Row Name 05/25/24 1517       Plan    Plan Pt lives at home alone and plans to return home at discharge will drive self or has family to help if needed. Pcp is Dr Keller, she uses [facility] pharmacy and has [employer] and Medicare a+b. Pt is independent with adl's and use o2 at 3 liters at home from [employer]. Pt does not use any other dme or home health.    Patient/Family in Agreement with Plan yes                  Continued Care and Services - Admitted Since 5/25/2024    No active coordination exists for this encounter.        Expected Discharge Date and Time       Expected Discharge Date Expected Discharge Time    May 28, 2024            Demographic Summary       Row Name 05/25/24 1516       General Information    Admission Type inpatient    Arrived From home    Referral Source emergency department    Reason for Consult discharge planning                    Marcia Shelby RN

## 2024-05-25 NOTE — Clinical Note
Level of Care: Telemetry [5]   Diagnosis: Acute CHF [459990]   Admitting Physician: RENNY CASTILLO [[id number]]   Certification: I Certify That Inpatient Hospital Services Are Medically Necessary For Greater Than 2 Midnights

## 2024-05-25 NOTE — ED PROVIDER NOTES
"Subjective   History of Present Illness  85 y/o female that presents to emergency department with c/c \"SOA\" X 3 weeks. Patient states that she has had progressively worsening SOA for the past 3 weeks. Patient has had mild non-productive cough. No reported fever, chills, body aches. Patient denies any other associated complaints.     History provided by:  Patient   used: No    Shortness of Breath  Severity:  Moderate  Onset quality:  Gradual  Duration:  3 weeks  Timing:  Intermittent  Progression:  Worsening  Chronicity:  New  Context: not activity, not animal exposure, not emotional upset, not fumes, not occupational exposure, not pollens, not strong odors and not URI    Relieved by:  Nothing  Worsened by:  Nothing  Ineffective treatments:  None tried  Associated symptoms: cough    Associated symptoms: no chest pain, no diaphoresis, no ear pain, no hemoptysis, no neck pain, no PND, no rash, no sputum production, no syncope and no vomiting    Risk factors: no recent alcohol use, no family hx of DVT, no hx of cancer, no hx of PE/DVT, no obesity, no prolonged immobilization and no recent surgery        Review of Systems   Constitutional: Negative.  Negative for appetite change, chills, diaphoresis and fatigue.   HENT: Negative.  Negative for congestion, dental problem, drooling, ear discharge, ear pain, facial swelling, hearing loss and mouth sores.    Eyes: Negative.  Negative for photophobia, pain, redness and itching.   Respiratory: Negative.  Positive for cough and shortness of breath. Negative for hemoptysis, sputum production and chest tightness.    Cardiovascular: Negative.  Negative for chest pain, leg swelling, syncope and PND.   Gastrointestinal: Negative.  Negative for anal bleeding, constipation, diarrhea, nausea and vomiting.   Endocrine: Negative.  Negative for heat intolerance and polydipsia.   Genitourinary: Negative.  Negative for decreased urine volume, dysuria, enuresis, flank " pain, frequency, genital sores, hematuria, pelvic pain, urgency and vaginal bleeding.   Musculoskeletal: Negative.  Negative for gait problem, joint swelling, myalgias and neck pain.   Skin: Negative.  Negative for pallor, rash and wound.   Neurological: Negative.  Negative for facial asymmetry and numbness.   Hematological: Negative.  Negative for adenopathy.   Psychiatric/Behavioral: Negative.  Negative for behavioral problems, confusion, decreased concentration, dysphoric mood, hallucinations and self-injury. The patient is not nervous/anxious and is not hyperactive.    All other systems reviewed and are negative.      Past Medical History:   Diagnosis Date    Arthritis     Hyperlipidemia     Hypertension        No Known Allergies    History reviewed. No pertinent surgical history.    Family History   Problem Relation Age of Onset    Heart failure Mother        Social History     Socioeconomic History    Marital status:    Tobacco Use    Smoking status: Former     Current packs/day: 0.00     Average packs/day: 1 pack/day for 26.0 years (26.0 ttl pk-yrs)     Types: Cigarettes     Start date:      Quit date:      Years since quittin.4    Smokeless tobacco: Never   Vaping Use    Vaping status: Never Used   Substance and Sexual Activity    Alcohol use: Never    Drug use: Never           Objective   Physical Exam  Vitals and nursing note reviewed.   Constitutional:       General: She is not in acute distress.     Appearance: She is well-developed and normal weight. She is not ill-appearing, toxic-appearing or diaphoretic.      Interventions: She is not intubated.  HENT:      Head: Normocephalic and atraumatic.      Mouth/Throat:      Mouth: Mucous membranes are moist.      Pharynx: Oropharynx is clear. No pharyngeal swelling.   Eyes:      Extraocular Movements: Extraocular movements intact.      Pupils: Pupils are equal, round, and reactive to light.   Neck:      Thyroid: No thyromegaly.       Vascular: No hepatojugular reflux.      Trachea: No tracheal deviation.   Cardiovascular:      Rate and Rhythm: Normal rate and regular rhythm. No extrasystoles are present.     Pulses: Normal pulses. No decreased pulses.           Carotid pulses are  on the right side with bruit and  on the left side with bruit.     Heart sounds: No murmur heard.     No gallop.   Pulmonary:      Effort: Pulmonary effort is normal. No tachypnea, bradypnea, accessory muscle usage or respiratory distress. She is not intubated.      Breath sounds: No stridor. Examination of the right-upper field reveals decreased breath sounds and wheezing. Examination of the left-upper field reveals decreased breath sounds and wheezing. Examination of the right-middle field reveals decreased breath sounds and wheezing. Examination of the left-middle field reveals decreased breath sounds and wheezing. Examination of the right-lower field reveals decreased breath sounds and wheezing. Examination of the left-lower field reveals decreased breath sounds and wheezing. Decreased breath sounds and wheezing present.   Chest:      Chest wall: No mass, deformity, tenderness, crepitus or edema.   Abdominal:      Palpations: Abdomen is soft. There is no hepatomegaly, splenomegaly or mass.      Tenderness: There is no abdominal tenderness. There is no guarding.   Musculoskeletal:         General: Normal range of motion.      Cervical back: Normal range of motion and neck supple.      Right lower leg: No tenderness. No edema.      Left lower leg: No edema.   Lymphadenopathy:      Cervical: No cervical adenopathy.   Skin:     General: Skin is warm and dry.      Capillary Refill: Capillary refill takes less than 2 seconds.      Coloration: Skin is not cyanotic or pale.      Findings: No ecchymosis, erythema or rash.      Nails: There is no clubbing.   Neurological:      General: No focal deficit present.      Mental Status: She is alert and oriented to person, place,  and time. She is disoriented.      Cranial Nerves: No cranial nerve deficit.   Psychiatric:         Mood and Affect: Mood normal. Mood is not anxious.         Behavior: Behavior normal. Behavior is not agitated.         Procedures           ED Course  ED Course as of 05/25/24 1332   Sat May 25, 2024   1035 IMPRESSION:     Bilateral interstitial infiltrates which could be  inflammatory/infectious in origin or congestive..   []   1036 XR Chest 2 View []   1312    IMPRESSION:  Extensive interstitial changes, groundglass opacities and infiltrates.   Findings could be inflammatory/infectious in origin or related to CHF.   Background of interstitial chronic changes      []   1331 Discussed care with Dr. Castillo. Will admit the patient.  []      ED Course User Index  [] Solomon Cristobal PA-C                                             Medical Decision Making  Problems Addressed:  Acute congestive heart failure, unspecified heart failure type: complicated acute illness or injury  Renal insufficiency: complicated acute illness or injury  Respiratory acidosis: complicated acute illness or injury    Amount and/or Complexity of Data Reviewed  Labs: ordered.  Radiology: ordered. Decision-making details documented in ED Course.  ECG/medicine tests: ordered.    Risk  Prescription drug management.  Decision regarding hospitalization.        Final diagnoses:   Respiratory acidosis   Acute congestive heart failure, unspecified heart failure type   Renal insufficiency       ED Disposition  ED Disposition       ED Disposition   Decision to Admit    Condition   --    Comment   Level of Care: Telemetry [5]  Diagnosis: Acute CHF [395461]  Admitting Physician: RENNY CASTILLO [[id number]]  Certification: I Certify That Inpatient Hospital Services Are Medically Necessary For Greater Than 2 Midnights                 No follow-up provider specified.       Medication List      No changes were made to your prescriptions during  this visit.            Solomon Cristobal PA-C  05/25/24 [id number]

## 2024-05-26 LAB
ABSOLUTE LUNG FLUID CONTENT: 44 % (ref 20–35)
ALBUMIN SERPL-MCNC: 4.1 G/DL (ref 3.5–5.2)
ALBUMIN/GLOB SERPL: 1.6 G/DL
ALP SERPL-CCNC: 63 U/L (ref 39–117)
ALT SERPL W P-5'-P-CCNC: 27 U/L (ref 1–33)
ANION GAP SERPL CALCULATED.3IONS-SCNC: 12.1 MMOL/L (ref 5–15)
ANISOCYTOSIS BLD QL: NORMAL
APTT PPP: 25.7 SECONDS (ref 26.5–34.5)
AST SERPL-CCNC: 24 U/L (ref 1–32)
BASOPHILS # BLD AUTO: 0.09 10*3/MM3 (ref 0–0.2)
BASOPHILS NFR BLD AUTO: 1 % (ref 0–1.5)
BILIRUB SERPL-MCNC: 1 MG/DL (ref 0–1.2)
BILIRUB UR QL STRIP: NEGATIVE
BUN SERPL-MCNC: 30 MG/DL (ref 8–23)
BUN/CREAT SERPL: 16.6 (ref 7–25)
CALCIUM SPEC-SCNC: 9.1 MG/DL (ref 8.6–10.5)
CHLORIDE SERPL-SCNC: 106 MMOL/L (ref 98–107)
CLARITY UR: ABNORMAL
CO2 SERPL-SCNC: 25.9 MMOL/L (ref 22–29)
COLOR UR: YELLOW
CREAT SERPL-MCNC: 1.81 MG/DL (ref 0.57–1)
DEPRECATED RDW RBC AUTO: 92.6 FL (ref 37–54)
EGFRCR SERPLBLD CKD-EPI 2021: 27.7 ML/MIN/1.73
EOSINOPHIL # BLD AUTO: 0.52 10*3/MM3 (ref 0–0.4)
EOSINOPHIL NFR BLD AUTO: 6 % (ref 0.3–6.2)
ERYTHROCYTE [DISTWIDTH] IN BLOOD BY AUTOMATED COUNT: 21.3 % (ref 12.3–15.4)
FOLATE SERPL-MCNC: >20 NG/ML (ref 4.78–24.2)
GEN 5 2HR TROPONIN T REFLEX: 16 NG/L
GLOBULIN UR ELPH-MCNC: 2.6 GM/DL
GLUCOSE SERPL-MCNC: 111 MG/DL (ref 65–99)
GLUCOSE UR STRIP-MCNC: NEGATIVE MG/DL
HCT VFR BLD AUTO: 33.8 % (ref 34–46.6)
HGB BLD-MCNC: 10.6 G/DL (ref 12–15.9)
HGB UR QL STRIP.AUTO: NEGATIVE
HYPOCHROMIA BLD QL: NORMAL
IMM GRANULOCYTES # BLD AUTO: 0.08 10*3/MM3 (ref 0–0.05)
IMM GRANULOCYTES NFR BLD AUTO: 0.9 % (ref 0–0.5)
INR PPP: 0.93 (ref 0.9–1.1)
KETONES UR QL STRIP: NEGATIVE
LEUKOCYTE ESTERASE UR QL STRIP.AUTO: NEGATIVE
LYMPHOCYTES # BLD AUTO: 1.77 10*3/MM3 (ref 0.7–3.1)
LYMPHOCYTES NFR BLD AUTO: 20.3 % (ref 19.6–45.3)
MACROCYTES BLD QL SMEAR: NORMAL
MCH RBC QN AUTO: 36.7 PG (ref 26.6–33)
MCHC RBC AUTO-ENTMCNC: 31.4 G/DL (ref 31.5–35.7)
MCV RBC AUTO: 117 FL (ref 79–97)
MONOCYTES # BLD AUTO: 0.56 10*3/MM3 (ref 0.1–0.9)
MONOCYTES NFR BLD AUTO: 6.4 % (ref 5–12)
NEUTROPHILS NFR BLD AUTO: 5.69 10*3/MM3 (ref 1.7–7)
NEUTROPHILS NFR BLD AUTO: 65.4 % (ref 42.7–76)
NITRITE UR QL STRIP: NEGATIVE
NRBC BLD AUTO-RTO: 0.3 /100 WBC (ref 0–0.2)
PH UR STRIP.AUTO: 5.5 [PH] (ref 5–8)
PLAT MORPH BLD: NORMAL
PLATELET # BLD AUTO: 257 10*3/MM3 (ref 140–450)
PMV BLD AUTO: 10.1 FL (ref 6–12)
POTASSIUM SERPL-SCNC: 4.6 MMOL/L (ref 3.5–5.2)
PROT SERPL-MCNC: 6.7 G/DL (ref 6–8.5)
PROT UR QL STRIP: NEGATIVE
PROTHROMBIN TIME: 12.6 SECONDS (ref 12.1–14.7)
RBC # BLD AUTO: 2.89 10*6/MM3 (ref 3.77–5.28)
SODIUM SERPL-SCNC: 144 MMOL/L (ref 136–145)
SP GR UR STRIP: 1.01 (ref 1–1.03)
STOMATOCYTES BLD QL SMEAR: NORMAL
TROPONIN T DELTA: -2 NG/L
TROPONIN T SERPL HS-MCNC: 18 NG/L
TSH SERPL DL<=0.05 MIU/L-ACNC: 2.83 UIU/ML (ref 0.27–4.2)
UFH PPP CHRO-ACNC: <0.1 IU/ML (ref 0.3–0.7)
UROBILINOGEN UR QL STRIP: ABNORMAL
VIT B12 BLD-MCNC: 492 PG/ML (ref 211–946)
WBC NRBC COR # BLD AUTO: 8.71 10*3/MM3 (ref 3.4–10.8)

## 2024-05-26 PROCEDURE — 80053 COMPREHEN METABOLIC PANEL: CPT | Performed by: INTERNAL MEDICINE

## 2024-05-26 PROCEDURE — 93005 ELECTROCARDIOGRAM TRACING: CPT | Performed by: INTERNAL MEDICINE

## 2024-05-26 PROCEDURE — 85520 HEPARIN ASSAY: CPT | Performed by: INTERNAL MEDICINE

## 2024-05-26 PROCEDURE — 85610 PROTHROMBIN TIME: CPT | Performed by: INTERNAL MEDICINE

## 2024-05-26 PROCEDURE — 93010 ELECTROCARDIOGRAM REPORT: CPT | Performed by: INTERNAL MEDICINE

## 2024-05-26 PROCEDURE — 25010000002 HEPARIN (PORCINE) 25000-0.45 UT/250ML-% SOLUTION: Performed by: INTERNAL MEDICINE

## 2024-05-26 PROCEDURE — 85730 THROMBOPLASTIN TIME PARTIAL: CPT | Performed by: INTERNAL MEDICINE

## 2024-05-26 PROCEDURE — 84443 ASSAY THYROID STIM HORMONE: CPT | Performed by: INTERNAL MEDICINE

## 2024-05-26 PROCEDURE — 94799 UNLISTED PULMONARY SVC/PX: CPT

## 2024-05-26 PROCEDURE — 94660 CPAP INITIATION&MGMT: CPT

## 2024-05-26 PROCEDURE — 85007 BL SMEAR W/DIFF WBC COUNT: CPT | Performed by: INTERNAL MEDICINE

## 2024-05-26 PROCEDURE — 94726 PLETHYSMOGRAPHY LUNG VOLUMES: CPT

## 2024-05-26 PROCEDURE — 25010000002 BUMETANIDE PER 0.5 MG: Performed by: INTERNAL MEDICINE

## 2024-05-26 PROCEDURE — 84484 ASSAY OF TROPONIN QUANT: CPT | Performed by: INTERNAL MEDICINE

## 2024-05-26 PROCEDURE — 85025 COMPLETE CBC W/AUTO DIFF WBC: CPT | Performed by: INTERNAL MEDICINE

## 2024-05-26 PROCEDURE — 25010000002 HEPARIN (PORCINE) PER 1000 UNITS: Performed by: INTERNAL MEDICINE

## 2024-05-26 PROCEDURE — 93005 ELECTROCARDIOGRAM TRACING: CPT

## 2024-05-26 PROCEDURE — 94761 N-INVAS EAR/PLS OXIMETRY MLT: CPT

## 2024-05-26 PROCEDURE — 81003 URINALYSIS AUTO W/O SCOPE: CPT | Performed by: INTERNAL MEDICINE

## 2024-05-26 PROCEDURE — 99232 SBSQ HOSP IP/OBS MODERATE 35: CPT | Performed by: INTERNAL MEDICINE

## 2024-05-26 PROCEDURE — 36415 COLL VENOUS BLD VENIPUNCTURE: CPT | Performed by: INTERNAL MEDICINE

## 2024-05-26 RX ORDER — HEPARIN SODIUM 5000 [USP'U]/ML
4000 INJECTION, SOLUTION INTRAVENOUS; SUBCUTANEOUS AS NEEDED
Status: DISCONTINUED | OUTPATIENT
Start: 2024-05-26 | End: 2024-05-26

## 2024-05-26 RX ORDER — HEPARIN SODIUM 10000 [USP'U]/100ML
14.09 INJECTION, SOLUTION INTRAVENOUS
Status: DISCONTINUED | OUTPATIENT
Start: 2024-05-26 | End: 2024-05-29

## 2024-05-26 RX ORDER — HEPARIN SODIUM 5000 [USP'U]/ML
4000 INJECTION, SOLUTION INTRAVENOUS; SUBCUTANEOUS ONCE
Status: COMPLETED | OUTPATIENT
Start: 2024-05-26 | End: 2024-05-26

## 2024-05-26 RX ORDER — ACETAMINOPHEN 325 MG/1
650 TABLET ORAL ONCE
Status: DISCONTINUED | OUTPATIENT
Start: 2024-05-26 | End: 2024-05-30 | Stop reason: HOSPADM

## 2024-05-26 RX ORDER — BUMETANIDE 0.25 MG/ML
2 INJECTION INTRAMUSCULAR; INTRAVENOUS ONCE
Status: COMPLETED | OUTPATIENT
Start: 2024-05-26 | End: 2024-05-26

## 2024-05-26 RX ORDER — METOPROLOL TARTRATE 100 MG/1
100 TABLET ORAL 2 TIMES DAILY
Status: DISCONTINUED | OUTPATIENT
Start: 2024-05-26 | End: 2024-05-30 | Stop reason: HOSPADM

## 2024-05-26 RX ORDER — METHOCARBAMOL 750 MG/1
750 TABLET, FILM COATED ORAL ONCE
Status: COMPLETED | OUTPATIENT
Start: 2024-05-26 | End: 2024-05-26

## 2024-05-26 RX ORDER — HEPARIN SODIUM 5000 [USP'U]/ML
2000 INJECTION, SOLUTION INTRAVENOUS; SUBCUTANEOUS AS NEEDED
Status: DISCONTINUED | OUTPATIENT
Start: 2024-05-26 | End: 2024-05-26

## 2024-05-26 RX ADMIN — OXAZEPAM 15 MG: 15 CAPSULE ORAL at 09:04

## 2024-05-26 RX ADMIN — Medication 10 ML: at 09:04

## 2024-05-26 RX ADMIN — METHOCARBAMOL 750 MG: 750 TABLET, FILM COATED ORAL at 05:39

## 2024-05-26 RX ADMIN — HEPARIN SODIUM 11.09 UNITS/KG/HR: 10000 INJECTION, SOLUTION INTRAVENOUS at 16:57

## 2024-05-26 RX ADMIN — METOPROLOL TARTRATE 100 MG: 100 TABLET, FILM COATED ORAL at 20:08

## 2024-05-26 RX ADMIN — DOCUSATE SODIUM 50 MG AND SENNOSIDES 8.6 MG 2 TABLET: 8.6; 5 TABLET, FILM COATED ORAL at 09:04

## 2024-05-26 RX ADMIN — GABAPENTIN 100 MG: 100 CAPSULE ORAL at 09:04

## 2024-05-26 RX ADMIN — GABAPENTIN 100 MG: 100 CAPSULE ORAL at 20:08

## 2024-05-26 RX ADMIN — HEPARIN SODIUM 4000 UNITS: 5000 INJECTION INTRAVENOUS; SUBCUTANEOUS at 16:59

## 2024-05-26 RX ADMIN — OXAZEPAM 15 MG: 15 CAPSULE ORAL at 20:14

## 2024-05-26 RX ADMIN — METOPROLOL TARTRATE 50 MG: 50 TABLET, FILM COATED ORAL at 09:04

## 2024-05-26 RX ADMIN — Medication 10 ML: at 20:12

## 2024-05-26 RX ADMIN — BUMETANIDE 2 MG: 0.25 INJECTION INTRAMUSCULAR; INTRAVENOUS at 09:42

## 2024-05-26 RX ADMIN — SODIUM CHLORIDE 5 MG/HR: 900 INJECTION, SOLUTION INTRAVENOUS at 16:44

## 2024-05-26 NOTE — PROGRESS NOTES
[facility] HOSPITALIST PROGRESS NOTE    Subjective     History:   Aliyah Adkins is a 84 y.o. female admitted on 5/25/2024 secondary to Acute CHF     Procedures: None    CC: Follow up HF, resp failure    Patient seen and examined with ANDRE Jeffers. Awake and alert with her sister present at bedside. States she feels better today with improving dyspnea and edema. No reported CP or palpitations. No reported cough. No acute events overnight per RN.     History taken from: patient, chart, and RN.      Objective     Vital Signs  Temp:  [97.6 °F (36.4 °C)-99.1 °F (37.3 °C)] 98.5 °F (36.9 °C)  Heart Rate:  [69-96] 74  Resp:  [18-20] 18  BP: (117-171)/(51-89) 117/51    Intake/Output Summary (Last 24 hours) at 5/26/2024 1421  Last data filed at 5/26/2024 1320  Gross per 24 hour   Intake 960 ml   Output 2850 ml   Net -1890 ml         Physical Exam:  General:    Awake, alert, in no acute distress, chronically ill appearing   Heart:      Normal S1 and S2. Regular rate and rhythm. No significant murmur, rubs or gallops appreciated.   Lungs:     Respirations regular, even and unlabored. Diminished breath sounds at bases. No wheezes, rales or rhonchi.   Abdomen:   Soft and nontender. Mildly distended but improved. No guarding, rebound tenderness or  organomegaly noted. Bowel sounds present x 4.   Extremities:  Tr pedal edema but improved. Moves UE and LE equally B/L.     Results Review:    Results from last 7 days   Lab Units 05/26/24  0103 05/25/24  0854   WBC 10*3/mm3 8.71 9.61   HEMOGLOBIN g/dL 10.6* 11.0*   PLATELETS 10*3/mm3 257 303     Results from last 7 days   Lab Units 05/26/24  0103 05/25/24  0854   SODIUM mmol/L 144 145   POTASSIUM mmol/L 4.6 4.8   CHLORIDE mmol/L 106 108*   CO2 mmol/L 25.9 24.5   BUN mg/dL 30* 31*   CREATININE mg/dL 1.81* 1.99*   CALCIUM mg/dL 9.1 9.0   GLUCOSE mg/dL 111* 174*     Results from last 7 days   Lab Units 05/26/24  0103 05/25/24  0854   BILIRUBIN mg/dL 1.0 1.0   ALK PHOS U/L 63  72   AST (SGOT) U/L 24 39*   ALT (SGPT) U/L 27 34*             Results from last 7 days   Lab Units 05/26/24  0335 05/26/24  0103 05/25/24  0854   HSTROP T ng/L 16* 18* 24*       Imaging Results (Last 24 Hours)       Procedure Component Value Units Date/Time    US Renal Bilateral [[id number]] Collected: 05/25/24 2303     Updated: 05/25/24 2306    Narrative:      PROCEDURE: Renal ultrasound evaluation performed on May 25, 2024. Color  flow imaging performed.     HISTORY: Acute renal insufficiency.     COMPARISON: None.     FINDINGS:     The right kidney measures 10.2 cm in length.  The left kidney measures 11.4 cm in length.  Normal renal cortical thickness and echogenicity.  No renal mass, cyst, or stone.  No hydronephrosis. No perinephric fluid collection.       Impression:         1.  No acute process seen in the right and left kidney.  2.  No renal mass, cyst, or stone.  3.  No hydronephrosis.  4.  Normal renal cortical thickness and echogenicity.     This report was finalized on 5/25/2024 11:04 PM by Son Palm MD.                 Medications:  acetaminophen, 650 mg, Oral, Once  enoxaparin, 30 mg, Subcutaneous, Nightly  gabapentin, 100 mg, Oral, BID  metoprolol tartrate, 50 mg, Oral, BID  oxazepam, 15 mg, Oral, BID  senna-docusate sodium, 2 tablet, Oral, BID  sodium chloride, 10 mL, Intravenous, Q12H               Assessment & Plan   Acute on chronic diastolic CHF: Echo in 8/23 revealed an EF of 61-65%, borderline LVH, grade I diastolic dysfunction and severe pulmonary HTN. Repeat echo ordered. Cont HF pathway. Order additional IV Bumex. Cont to monitor volume status.      Acute hypercapnic with chronic hypoxic respiratory failure: Likely 2/2 above. Diuresis as above. Will continue tomonitor off antibiotics as clinical presentation appears more consistent with HF with normal inflammatory markers. Initiated BiPAP in the ED with subsequent clinical improvement and improving follow up ABG. Cont BiPAP PRN and at  HS.      ADENIKE on CKD IIIa: No obstruction on renal US. Cr improved today with diuresis. Order additional diuresis as above. Cont to avoid any potentially nephrotoxic agents including home Celebrex and losartan. Monitor UOP and repeat labs in the AM.      Mild troponin elevation: Likely 2/2 HF and ADENIKE. Repeat troponin trended downward. Echo ordered as above. Monitor on telemetry.      Essential HTN: BP mildly elevated intermittently but overall stable. Cont home metoprolol. Holding losartan as above. Cont to monitor.     Severe pulmonary HTN: Diuresis as above. Repeat echo ordered.     Macrocytic anemia: TSH is normal. Check B12 and folate. Repeat CBC in the AM.     Elevated liver enzymes: Possibly hepatic congestion. Improved today.      Goals of Care: Discussed with patient on admission and she wishes to be DNR/DNI.      DVT PPX: Lovenox    Disposition Likely home when medically stable, possibly 24-48 hours.     Mika Steen DO  05/26/24  14:21 EDT

## 2024-05-26 NOTE — PLAN OF CARE
"Goal Outcome Evaluation:   Pt admitted to 3S this shift from ED. IV access maintained. Cardiac monitoring initiated. Pt c/o bilat lower extremity \"cramps\", ADDIS Bravo APRN aware, see MAR. No further complaints noted. Pt noted to remove nasal cannula and bipap on her own occasionally, educated pt on importance of wearing nasal cannula and letting staff know when she needs a break from the bipap, verbalizes understanding. No visible acute s/s of distress noted. Plan of care ongoing.    Daily Care Plan Summary: Heart Failure    Diuretic in use (IV or PO):   IV, no active diuretic orders at this time         Daily weight (up or down):    loss: 0lbs, new admission on floor less than 24hrs.       Output > Intake (yes/no):Yes      O2 Requirements (current, any change?):  3 liters/min via nasal cannula      Symptoms noted with Activity (Respiratory Tolerance, functional state):     o2 sat noted to drop quickly when getting up      Anticipated Discharge Plans:     pending clinical course                                          "

## 2024-05-26 NOTE — PLAN OF CARE
Goal Outcome Evaluation:      Daily Care Plan Summary: Heart Failure    Diuretic in use (IV or PO):   IV        Daily weight (up or down):          Output > Intake (yes/no):yes      O2 Requirements (current, any change?): same      Symptoms noted with Activity (Respiratory Tolerance, functional state):    short of air      Anticipated Discharge Plans:    discharge anticipated when stable

## 2024-05-27 ENCOUNTER — APPOINTMENT (OUTPATIENT)
Dept: CARDIOLOGY | Facility: HOSPITAL | Age: 84
End: 2024-05-27
Payer: MEDICARE

## 2024-05-27 LAB
ANION GAP SERPL CALCULATED.3IONS-SCNC: 9.8 MMOL/L (ref 5–15)
ANISOCYTOSIS BLD QL: NORMAL
BASOPHILS # BLD AUTO: 0.1 10*3/MM3 (ref 0–0.2)
BASOPHILS NFR BLD AUTO: 1.1 % (ref 0–1.5)
BH CV ECHO MEAS - AO MAX PG: 10.5 MMHG
BH CV ECHO MEAS - AO MEAN PG: 6 MMHG
BH CV ECHO MEAS - AO ROOT DIAM: 3 CM
BH CV ECHO MEAS - AO V2 MAX: 162 CM/SEC
BH CV ECHO MEAS - AO V2 VTI: 38.5 CM
BH CV ECHO MEAS - EDV(CUBED): 73.6 ML
BH CV ECHO MEAS - EDV(MOD-SP4): 52.5 ML
BH CV ECHO MEAS - EF(MOD-BP): 71 %
BH CV ECHO MEAS - EF(MOD-SP4): 71.2 %
BH CV ECHO MEAS - ESV(CUBED): 25.7 ML
BH CV ECHO MEAS - ESV(MOD-SP4): 15.1 ML
BH CV ECHO MEAS - FS: 29.6 %
BH CV ECHO MEAS - IVS/LVPW: 1.37 CM
BH CV ECHO MEAS - IVSD: 1.9 CM
BH CV ECHO MEAS - LA DIMENSION: 3.7 CM
BH CV ECHO MEAS - LAT PEAK E' VEL: 8.9 CM/SEC
BH CV ECHO MEAS - LV DIASTOLIC VOL/BSA (35-75): 27.5 CM2
BH CV ECHO MEAS - LV MASS(C)D: 287.6 GRAMS
BH CV ECHO MEAS - LV SYSTOLIC VOL/BSA (12-30): 7.9 CM2
BH CV ECHO MEAS - LVIDD: 4.2 CM
BH CV ECHO MEAS - LVIDS: 3 CM
BH CV ECHO MEAS - LVOT AREA: 3.5 CM2
BH CV ECHO MEAS - LVOT DIAM: 2.1 CM
BH CV ECHO MEAS - LVPWD: 1.39 CM
BH CV ECHO MEAS - MED PEAK E' VEL: 5.8 CM/SEC
BH CV ECHO MEAS - MV A MAX VEL: 109 CM/SEC
BH CV ECHO MEAS - MV E MAX VEL: 119 CM/SEC
BH CV ECHO MEAS - MV E/A: 1.09
BH CV ECHO MEAS - PA ACC TIME: 0.09 SEC
BH CV ECHO MEAS - RAP SYSTOLE: 10 MMHG
BH CV ECHO MEAS - RVSP: 51 MMHG
BH CV ECHO MEAS - SV(MOD-SP4): 37.4 ML
BH CV ECHO MEAS - SVI(MOD-SP4): 19.6 ML/M2
BH CV ECHO MEAS - TAPSE (>1.6): 1.78 CM
BH CV ECHO MEAS - TR MAX PG: 41 MMHG
BH CV ECHO MEAS - TR MAX VEL: 320 CM/SEC
BH CV ECHO MEASUREMENTS AVERAGE E/E' RATIO: 16.19
BUN SERPL-MCNC: 31 MG/DL (ref 8–23)
BUN/CREAT SERPL: 17 (ref 7–25)
CALCIUM SPEC-SCNC: 9.4 MG/DL (ref 8.6–10.5)
CHLORIDE SERPL-SCNC: 103 MMOL/L (ref 98–107)
CO2 SERPL-SCNC: 31.2 MMOL/L (ref 22–29)
CREAT SERPL-MCNC: 1.82 MG/DL (ref 0.57–1)
D DIMER PPP FEU-MCNC: <0.27 MCGFEU/ML (ref 0–0.82)
DEPRECATED RDW RBC AUTO: 92.6 FL (ref 37–54)
EGFRCR SERPLBLD CKD-EPI 2021: 27.5 ML/MIN/1.73
EOSINOPHIL # BLD AUTO: 0.44 10*3/MM3 (ref 0–0.4)
EOSINOPHIL NFR BLD AUTO: 4.8 % (ref 0.3–6.2)
ERYTHROCYTE [DISTWIDTH] IN BLOOD BY AUTOMATED COUNT: 21.4 % (ref 12.3–15.4)
GLUCOSE SERPL-MCNC: 126 MG/DL (ref 65–99)
HCT VFR BLD AUTO: 35.7 % (ref 34–46.6)
HGB BLD-MCNC: 11.3 G/DL (ref 12–15.9)
HYPOCHROMIA BLD QL: NORMAL
IMM GRANULOCYTES # BLD AUTO: 0.06 10*3/MM3 (ref 0–0.05)
IMM GRANULOCYTES NFR BLD AUTO: 0.7 % (ref 0–0.5)
LEFT ATRIUM VOLUME INDEX: 20.8 ML/M2
LYMPHOCYTES # BLD AUTO: 1.97 10*3/MM3 (ref 0.7–3.1)
LYMPHOCYTES NFR BLD AUTO: 21.5 % (ref 19.6–45.3)
MACROCYTES BLD QL SMEAR: NORMAL
MCH RBC QN AUTO: 36.8 PG (ref 26.6–33)
MCHC RBC AUTO-ENTMCNC: 31.7 G/DL (ref 31.5–35.7)
MCV RBC AUTO: 116.3 FL (ref 79–97)
MONOCYTES # BLD AUTO: 0.63 10*3/MM3 (ref 0.1–0.9)
MONOCYTES NFR BLD AUTO: 6.9 % (ref 5–12)
NEUTROPHILS NFR BLD AUTO: 5.97 10*3/MM3 (ref 1.7–7)
NEUTROPHILS NFR BLD AUTO: 65 % (ref 42.7–76)
NRBC BLD AUTO-RTO: 0.4 /100 WBC (ref 0–0.2)
PLAT MORPH BLD: NORMAL
PLATELET # BLD AUTO: 260 10*3/MM3 (ref 140–450)
PMV BLD AUTO: 9.7 FL (ref 6–12)
POTASSIUM SERPL-SCNC: 4.2 MMOL/L (ref 3.5–5.2)
QT INTERVAL: 318 MS
QT INTERVAL: 348 MS
QTC INTERVAL: 391 MS
QTC INTERVAL: 465 MS
RBC # BLD AUTO: 3.07 10*6/MM3 (ref 3.77–5.28)
SODIUM SERPL-SCNC: 144 MMOL/L (ref 136–145)
STOMATOCYTES BLD QL SMEAR: NORMAL
UFH PPP CHRO-ACNC: 0.22 IU/ML (ref 0.3–0.7)
UFH PPP CHRO-ACNC: 0.28 IU/ML (ref 0.3–0.7)
UFH PPP CHRO-ACNC: 0.28 IU/ML (ref 0.3–0.7)
UFH PPP CHRO-ACNC: 0.34 IU/ML (ref 0.3–0.7)
WBC NRBC COR # BLD AUTO: 9.17 10*3/MM3 (ref 3.4–10.8)

## 2024-05-27 PROCEDURE — 94660 CPAP INITIATION&MGMT: CPT

## 2024-05-27 PROCEDURE — 85025 COMPLETE CBC W/AUTO DIFF WBC: CPT | Performed by: INTERNAL MEDICINE

## 2024-05-27 PROCEDURE — 85379 FIBRIN DEGRADATION QUANT: CPT | Performed by: PHYSICIAN ASSISTANT

## 2024-05-27 PROCEDURE — 25010000002 HEPARIN (PORCINE) 25000-0.45 UT/250ML-% SOLUTION: Performed by: STUDENT IN AN ORGANIZED HEALTH CARE EDUCATION/TRAINING PROGRAM

## 2024-05-27 PROCEDURE — 93306 TTE W/DOPPLER COMPLETE: CPT | Performed by: INTERNAL MEDICINE

## 2024-05-27 PROCEDURE — 85520 HEPARIN ASSAY: CPT

## 2024-05-27 PROCEDURE — 94799 UNLISTED PULMONARY SVC/PX: CPT

## 2024-05-27 PROCEDURE — 93306 TTE W/DOPPLER COMPLETE: CPT

## 2024-05-27 PROCEDURE — 85520 HEPARIN ASSAY: CPT | Performed by: STUDENT IN AN ORGANIZED HEALTH CARE EDUCATION/TRAINING PROGRAM

## 2024-05-27 PROCEDURE — 80048 BASIC METABOLIC PNL TOTAL CA: CPT | Performed by: INTERNAL MEDICINE

## 2024-05-27 PROCEDURE — 99232 SBSQ HOSP IP/OBS MODERATE 35: CPT | Performed by: STUDENT IN AN ORGANIZED HEALTH CARE EDUCATION/TRAINING PROGRAM

## 2024-05-27 PROCEDURE — 99222 1ST HOSP IP/OBS MODERATE 55: CPT | Performed by: INTERNAL MEDICINE

## 2024-05-27 PROCEDURE — 85007 BL SMEAR W/DIFF WBC COUNT: CPT | Performed by: INTERNAL MEDICINE

## 2024-05-27 RX ADMIN — OXAZEPAM 15 MG: 15 CAPSULE ORAL at 09:37

## 2024-05-27 RX ADMIN — OXAZEPAM 15 MG: 15 CAPSULE ORAL at 20:11

## 2024-05-27 RX ADMIN — Medication 10 ML: at 20:29

## 2024-05-27 RX ADMIN — GABAPENTIN 100 MG: 100 CAPSULE ORAL at 09:37

## 2024-05-27 RX ADMIN — METOPROLOL TARTRATE 100 MG: 100 TABLET, FILM COATED ORAL at 09:37

## 2024-05-27 RX ADMIN — Medication 10 ML: at 09:38

## 2024-05-27 RX ADMIN — METOPROLOL TARTRATE 100 MG: 100 TABLET, FILM COATED ORAL at 20:11

## 2024-05-27 RX ADMIN — GABAPENTIN 100 MG: 100 CAPSULE ORAL at 20:11

## 2024-05-27 RX ADMIN — HEPARIN SODIUM 13.09 UNITS/KG/HR: 10000 INJECTION, SOLUTION INTRAVENOUS at 12:03

## 2024-05-27 RX ADMIN — DOCUSATE SODIUM 50 MG AND SENNOSIDES 8.6 MG 2 TABLET: 8.6; 5 TABLET, FILM COATED ORAL at 20:11

## 2024-05-27 NOTE — PROGRESS NOTES
HEPARIN INFUSION  Aliyah Adkins is a  84 y.o. female receiving heparin infusion.     Therapy for (VTE/Cardiac):   Cardiac  Patient Dosing Weight: 90.2 kg  Initial Bolus (Y/N):   Y  Any Bolus (Y/N):   Y        Signs or Symptoms of Bleeding: N    Cardiac or Other (Not VTE)   Initial Bolus: 60 units/kg (Max 4,000 units)  Initial rate: 12 units/kg/hr (Max 1,000 units/hr)   Anti Xa Rebolus Infusion Hold time Change infusion Dose (Units/kg/hr) Next Anti Xa or aPTT Level Due   < 0.11 50 Units/kg  (4000 Units Max) None Increase by  3 Units/kg/hr 6 hours   0.11- 0.19 25 Units/kg  (2000 Units Max) None Increase by  2 Units/kg/hr 6 hours   0.2 - 0.29 0 None Increase by  1 Units/kg/hr 6 hours   0.3 - 0.5 0 None No Change 6 hours (after 2 consecutive levels in range check q24h @0700)   0.51 - 0.6 0 None Decrease by  1 Units/kg/hr 6 hours   0.61 - 0.8 0 30 Minutes Decrease by  2 Units/kg/hr 6 hours   0.81 - 1 0 60 Minutes Decrease by  3 Units/kg/hr 6 hours   >1 0 Hold  After Anti Xa less than 0.5 decrease previous rate by  4 Units/kg/hr  Every 2 hours until Anti Xa  less than 0.5 then when infusion restarts in 6 hours     Recommend anti-Xa every 6 hours.          Date   Time   Anti-Xa Current Rate (Unit/kg/hr) Bolus   (Units) Rate Change   (Unit/kg/hr) New Rate (Unit/kg/hr) Next   Anti-Xa Comments  Pump Check Daily   5/26 1646 Drawn - 4000 +11.09 11.09 2300 No s/s of bleeding per nurse Kendal.    5/27 0110 0.22 11.09 - +1 12.09 0700 Discussed rate increase with ANDRE Krueger. No s/s of bleeding.                                                                                                                                                                                                                       Pharmacy will continue to follow anti-Xa results and monitor for signs and symptoms of bleeding or thrombosis.    Marcos Crockett, Sanjana  05/27/24 01:17 EDT

## 2024-05-27 NOTE — CONSULTS
Consults  Date of Admit: 5/25/2024  Date of Consult: 05/27/24  No ref. provider found  Aliyah Adkins  1940  Consulting Physician: Dr. Molina    Cardiology consultation    Reason for consultation: acute HfpEF  Assessment:  Acute HFpEF  ADENIKE, possibly cardiorenal syndrome  Elevated troponin likely type II MI secondary to HFpEF  Essential hypertension  Severe pulmonary hypertension      Recommendations:  Patient clinically already improving reports pedal edema nearly resolved.  Will order D-dimer to rule out underlying DVT.  Will plan for stress test in the morning given new onset heart failure        History of Present Illness    Subjective     Chief Complaint   Patient presents with    Shortness of Breath       Aliyah Adkins is a 84 y.o. female with past medical history significant for no known history of major cardiovascular disease.  She does reportedly have history of hypertension and hyperlipidemia.  Patient presented to [facility] emergency department due to progressively worsening shortness of breath.  She reports that the symptoms started roughly 3 weeks ago however as they progressed she decided to come to the ER.  Upon evaluation in the ER she was found to have radiological evidence of pulmonary congestion with elevated creatinine of 1.99.  She was admitted for further treatment.  She was given IV Bumex with good diuretic response -1 L on 5/25/2024 and -560 mL on 7/26/2024.  Cardiology services were consulted for assistance in management.  Speaking to Aliyah directly during her interview she reports her breathing has improved as well as her pedal edema.  She denied any chest pains at all.            Past Medical History:   Diagnosis Date    Arthritis     Hyperlipidemia     Hypertension      History reviewed. No pertinent surgical history.  Family History   Problem Relation Age of Onset    Heart failure Mother      Social History     Tobacco Use    Smoking status: Former     Current packs/day:  0.00     Average packs/day: 1 pack/day for 26.0 years (26.0 ttl pk-yrs)     Types: Cigarettes     Start date:      Quit date:      Years since quittin.4    Smokeless tobacco: Never   Vaping Use    Vaping status: Never Used   Substance Use Topics    Alcohol use: Never    Drug use: Never     Medications Prior to Admission   Medication Sig Dispense Refill Last Dose    celecoxib (CeleBREX) 100 MG capsule Take 1 capsule by mouth 2 (Two) Times a Day As Needed for Mild Pain.   2024    gabapentin (NEURONTIN) 100 MG capsule Take 1 capsule by mouth 2 (Two) Times a Day.   2024    losartan (COZAAR) 100 MG tablet Take 1 tablet by mouth Daily.   2024    metoprolol tartrate (LOPRESSOR) 50 MG tablet Take 1 tablet by mouth 2 (Two) Times a Day.   2024    oxazepam (SERAX) 15 MG capsule Take 1 capsule by mouth 2 (Two) Times a Day.   2024     Allergies:  Patient has no known allergies.      Current Facility-Administered Medications:     acetaminophen (TYLENOL) tablet 650 mg, 650 mg, Oral, Q4H PRN, Mika Steen DO    acetaminophen (TYLENOL) tablet 650 mg, 650 mg, Oral, Once, Ethan Bravo APRN    sennosides-docusate (PERICOLACE) 8.6-50 MG per tablet 2 tablet, 2 tablet, Oral, BID, 2 tablet at 24 0904 **AND** polyethylene glycol (MIRALAX) packet 17 g, 17 g, Oral, Daily PRN **AND** bisacodyl (DULCOLAX) EC tablet 5 mg, 5 mg, Oral, Daily PRN **AND** bisacodyl (DULCOLAX) suppository 10 mg, 10 mg, Rectal, Daily PRN, Mika Steen DO    calcium carbonate (TUMS) chewable tablet 500 mg (200 mg elemental), 2 tablet, Oral, BID PRN, Mika Steen DO    [Held by provider] dilTIAZem (CARDIZEM) 100 mg in 100 mL NS infusion (ADV), 5-15 mg/hr, Intravenous, Continuous, Mika Steen DO, Stopped at 24 2200    gabapentin (NEURONTIN) capsule 100 mg, 100 mg, Oral, BID, Mika Steen DO, 100 mg at 24 0937    heparin 91037 units/250 mL (100 units/mL) in 0.45  % NaCl infusion, 13.09 Units/kg/hr (Dosing Weight), Intravenous, Titrated, Tyson Ruiz, DO, Last Rate: 11.8 mL/hr at 05/27/24 1203, 13.09 Units/kg/hr at 05/27/24 1203    metoprolol tartrate (LOPRESSOR) tablet 100 mg, 100 mg, Oral, BID, Enio, Christopher A, DO, 100 mg at 05/27/24 0937    nitroglycerin (NITROSTAT) SL tablet 0.4 mg, 0.4 mg, Sublingual, Q5 Min PRN, Enio, Christopher A, DO    oxazepam (SERAX) capsule 15 mg, 15 mg, Oral, BID, Enio, Christopher A, DO, 15 mg at 05/27/24 0937    Pharmacy to Dose Heparin, , Does not apply, Continuous PRN, Enio, Christopher A, DO    sodium chloride 0.9 % flush 10 mL, 10 mL, Intravenous, PRN, Joshua Ochoaey, DO    sodium chloride 0.9 % flush 10 mL, 10 mL, Intravenous, Q12H, Enio, Christopher A, DO, 10 mL at 05/27/24 0938    sodium chloride 0.9 % flush 10 mL, 10 mL, Intravenous, PRN, Enio, Christopher A, DO    sodium chloride 0.9 % infusion 40 mL, 40 mL, Intravenous, PRN, Enio, Christopher A, DO    Review of Systems   Constitutional:  Negative for chills and fatigue.   Respiratory:  Positive for shortness of breath. Negative for chest tightness.    Cardiovascular:  Positive for leg swelling. Negative for chest pain and palpitations.   Gastrointestinal:  Negative for abdominal pain and diarrhea.   Genitourinary:  Negative for difficulty urinating and hematuria.   Musculoskeletal:  Negative for arthralgias and back pain.   Neurological:  Negative for dizziness and syncope.   Hematological:  Negative for adenopathy. Does not bruise/bleed easily.   Psychiatric/Behavioral:  Negative for agitation and dysphoric mood.          Objective      Vital Signs  Temp:  [98.1 °F (36.7 °C)-99.2 °F (37.3 °C)] 99.2 °F (37.3 °C)  Heart Rate:  [] 78  Resp:  [18-20] 19  BP: (122-142)/(46-79) 142/79  Body mass index is 32.32 kg/m².    Intake/Output Summary (Last 24 hours) at 5/27/2024 1249  Last data filed at 5/27/2024 1246  Gross per 24 hour   Intake 600 ml   Output 1400  "ml   Net -800 ml       Physical Exam  Constitutional:       Appearance: Normal appearance.   HENT:      Head: Normocephalic and atraumatic.   Cardiovascular:      Rate and Rhythm: Normal rate and regular rhythm.   Pulmonary:      Effort: Pulmonary effort is normal.      Breath sounds: Normal breath sounds.   Abdominal:      General: Abdomen is flat.      Palpations: Abdomen is soft.   Musculoskeletal:      Right lower leg: Edema present.      Left lower leg: Edema present.   Neurological:      General: No focal deficit present.      Mental Status: She is alert and oriented to person, place, and time.   Psychiatric:         Mood and Affect: Mood normal.         Behavior: Behavior normal.           Results Review:   I reviewed the patient's new clinical results.  Results from last 7 days   Lab Units 05/26/24  0335 05/26/24  0103 05/25/24  0854   HSTROP T ng/L 16* 18* 24*     Results from last 7 days   Lab Units 05/27/24  0015 05/26/24  0103 05/25/24  0854   WBC 10*3/mm3 9.17 8.71 9.61   HEMOGLOBIN g/dL 11.3* 10.6* 11.0*   PLATELETS 10*3/mm3 260 257 303     Results from last 7 days   Lab Units 05/27/24  0015 05/26/24  0103 05/25/24  0854   SODIUM mmol/L 144 144 145   POTASSIUM mmol/L 4.2 4.6 4.8   CHLORIDE mmol/L 103 106 108*   CO2 mmol/L 31.2* 25.9 24.5   BUN mg/dL 31* 30* 31*   CREATININE mg/dL 1.82* 1.81* 1.99*   CALCIUM mg/dL 9.4 9.1 9.0   GLUCOSE mg/dL 126* 111* 174*   ALT (SGPT) U/L  --  27 34*   AST (SGOT) U/L  --  24 39*     Lab Results   Component Value Date    INR 0.93 05/26/2024    INR 0.92 06/06/2022     No results found for: \"MG\"  Lab Results   Component Value Date    TSH 2.830 05/26/2024      No results found for: \"BNP\"    EKG:     Imaging Results (Last 72 Hours)       Procedure Component Value Units Date/Time    US Renal Bilateral [[id number]] Collected: 05/25/24 2303     Updated: 05/25/24 2306    Narrative:      PROCEDURE: Renal ultrasound evaluation performed on May 25, 2024. Color  flow imaging " performed.     HISTORY: Acute renal insufficiency.     COMPARISON: None.     FINDINGS:     The right kidney measures 10.2 cm in length.  The left kidney measures 11.4 cm in length.  Normal renal cortical thickness and echogenicity.  No renal mass, cyst, or stone.  No hydronephrosis. No perinephric fluid collection.       Impression:         1.  No acute process seen in the right and left kidney.  2.  No renal mass, cyst, or stone.  3.  No hydronephrosis.  4.  Normal renal cortical thickness and echogenicity.     This report was finalized on 5/25/2024 11:04 PM by Son Palm MD.       CT Chest Without Contrast Diagnostic [[id number]] Collected: 05/25/24 1247     Updated: 05/25/24 1253    Narrative:      VERIFICATION OBSERVER NAME: Alexander Chavez MD.        Technique: Axial images were obtained along with coronal and sagittal  reconstruction. DLP in mGycm reported in the EMR records. Dose lowering  technique: Automated exposure control. Data included in the medical  records.      HISTORY/COMPARISON/FINDINGS:     Comparison: 7/21/2023     History and Findings: Shortness of breath     Mediastinal adenopathy noted.  Findings also suspected for bilateral  adenopathy.  Unchanged from prior.  Likely reactive.  Minimal cardiac enlargement.  No pericardial effusion.  Small bilateral  pleural effusion.  Visualized portion of the upper abdomen appeared unremarkable.  Extensive bilateral infiltrates, interstitial changes and groundglass  opacities.  Some of the interstitial changes also chronic in nature.  Confluent centrilobular emphysema.       Impression:      Extensive interstitial changes, groundglass opacities and infiltrates.   Findings could be inflammatory/infectious in origin or related to CHF.   Background of interstitial chronic changes              [id number]     ZIP Code [id number].                       This report was finalized on 5/25/2024 12:51 PM by Dr. Alexander Chavez MD.       XR Chest 2 View [[id number]] Collected:  05/25/24 0926     Updated: 05/25/24 0929    Narrative:      VERIFICATION OBSERVER NAME: Alexander Chavez MD.     TECHNIQUE, HISTORY, FINDINGS:      Comparison: None.  .     History and Findings: Shortness of breath.     2 views were obtained.  Elevated RIGHT hemidiaphragm.  Moderate cardiac enlargement.  Bilateral interstitial infiltrates which could be  inflammatory/infectious in origin or congestive.   Central airways are patent.   No pneumothorax or pleural effusion.   No acute osseous abnormality.       Impression:         Bilateral interstitial infiltrates which could be  inflammatory/infectious in origin or congestive..                    [id number]     Zip code: [id number]                 This report was finalized on 5/25/2024 9:27 AM by Dr. Alexander Chavez MD.                Thank you very much for asking us to be involved in this patient's care.  We will follow along with you.    Joni Gonzalez PA-C   05/27/24  12:49 EDT    Electronically signed by Joni Gonzalez PA-C, 05/27/24, 12:49 PM EDT.     Patient seen and examined.  Chart reviewed.  Agree with patient's charting, assessment and plan by GARIMA.  On exam  Patient alert awake and oriented in no distress, sensorimotor system are intact  Mucous members are moist  Chest no wheezing or crackles anteriorly, decreased breath sounds at bases  Regular rate rhythm, normal S1-S2  Abdomen distended nontender  1 + edema  Assessment and plan  #1 cardiac.  Patient with heart failure with preserved ejection fraction.  Chronic kidney disease.  RV systolic pressure of 45-55 noted.  Patient currently being diuresed.  D-dimer was checked which was negative.  Will recommend to get a stress test to rule out any ischemic causes of her symptoms.  Can consider vasodilator therapy like revatio once able to for pulm hypertension.  .Electronically signed by Marty Molina MD, 05/27/24, 3:25 PM EDT.

## 2024-05-27 NOTE — PLAN OF CARE
Goal Outcome Evaluation:  Pt rested in bed this shift. Denies any questions or concerns at this time. No s/s of acute distress noted. Call light within reach. Plan of care ongoing.

## 2024-05-27 NOTE — PLAN OF CARE
Daily Care Plan Summary: Heart Failure    Diuretic in use (IV or PO):   IV diuretics on hold per providder        Daily weight (up or down):    loss: 2.1lbs      Output > Intake (yes/no):Yes      O2 Requirements (current, any change?):  3.5 liters/min via nasal cannula      Symptoms noted with Activity (Respiratory Tolerance, functional state):     No c/o TABARES upon activity      Anticipated Discharge Plans:     Pt plans to d/c home with home oxygen 3L NC.

## 2024-05-27 NOTE — PLAN OF CARE
Goal Outcome Evaluation:   Daily Care Plan Summary: Heart Failure    Diuretic in use (IV or PO):   IV        Daily weight (up or down):    yes 2 lbs      Output > Intake (yes/no):yes      O2 Requirements (current, any change?): same      Symptoms noted with Activity (Respiratory Tolerance, functional state):    baseline      Anticipated Discharge Plans:    Home when stable

## 2024-05-28 ENCOUNTER — APPOINTMENT (OUTPATIENT)
Dept: NUCLEAR MEDICINE | Facility: HOSPITAL | Age: 84
End: 2024-05-28
Payer: MEDICARE

## 2024-05-28 ENCOUNTER — APPOINTMENT (OUTPATIENT)
Dept: CARDIOLOGY | Facility: HOSPITAL | Age: 84
End: 2024-05-28
Payer: MEDICARE

## 2024-05-28 PROBLEM — I25.10 CORONARY ARTERY DISEASE INVOLVING NATIVE CORONARY ARTERY OF NATIVE HEART: Status: ACTIVE | Noted: 2024-05-25

## 2024-05-28 LAB
ANION GAP SERPL CALCULATED.3IONS-SCNC: 11.1 MMOL/L (ref 5–15)
BH CV NUCLEAR PRIOR STUDY: 3
BH CV REST NUCLEAR ISOTOPE DOSE: 10.1 MCI
BH CV STRESS BP STAGE 1: NORMAL
BH CV STRESS COMMENTS STAGE 1: NORMAL
BH CV STRESS DOSE REGADENOSON STAGE 1: 0.4
BH CV STRESS DURATION MIN STAGE 1: 0
BH CV STRESS DURATION SEC STAGE 1: 10
BH CV STRESS HR STAGE 1: 102
BH CV STRESS NUCLEAR ISOTOPE DOSE: 30.1 MCI
BH CV STRESS PROTOCOL 1: NORMAL
BH CV STRESS RECOVERY BP: NORMAL MMHG
BH CV STRESS RECOVERY HR: 98 BPM
BH CV STRESS STAGE 1: 1
BUN SERPL-MCNC: 26 MG/DL (ref 8–23)
BUN/CREAT SERPL: 18.2 (ref 7–25)
CALCIUM SPEC-SCNC: 9.1 MG/DL (ref 8.6–10.5)
CHLORIDE SERPL-SCNC: 102 MMOL/L (ref 98–107)
CO2 SERPL-SCNC: 30.9 MMOL/L (ref 22–29)
CREAT SERPL-MCNC: 1.43 MG/DL (ref 0.57–1)
DEPRECATED RDW RBC AUTO: 91.6 FL (ref 37–54)
EGFRCR SERPLBLD CKD-EPI 2021: 36.7 ML/MIN/1.73
ERYTHROCYTE [DISTWIDTH] IN BLOOD BY AUTOMATED COUNT: 21 % (ref 12.3–15.4)
GLUCOSE SERPL-MCNC: 132 MG/DL (ref 65–99)
HCT VFR BLD AUTO: 33 % (ref 34–46.6)
HGB BLD-MCNC: 10.3 G/DL (ref 12–15.9)
LV EF NUC BP: 84 %
MAXIMAL PREDICTED HEART RATE: 138 BPM
MCH RBC QN AUTO: 36.4 PG (ref 26.6–33)
MCHC RBC AUTO-ENTMCNC: 31.2 G/DL (ref 31.5–35.7)
MCV RBC AUTO: 116.6 FL (ref 79–97)
PERCENT MAX PREDICTED HR: 73.91 %
PLATELET # BLD AUTO: 217 10*3/MM3 (ref 140–450)
PMV BLD AUTO: 9.9 FL (ref 6–12)
POTASSIUM SERPL-SCNC: 4.2 MMOL/L (ref 3.5–5.2)
RBC # BLD AUTO: 2.83 10*6/MM3 (ref 3.77–5.28)
SODIUM SERPL-SCNC: 144 MMOL/L (ref 136–145)
STRESS BASELINE BP: NORMAL MMHG
STRESS BASELINE HR: 80 BPM
STRESS PERCENT HR: 87 %
STRESS POST PEAK BP: NORMAL MMHG
STRESS POST PEAK HR: 102 BPM
STRESS TARGET HR: 117 BPM
UFH PPP CHRO-ACNC: 0.47 IU/ML (ref 0.3–0.7)
WBC NRBC COR # BLD AUTO: 7.73 10*3/MM3 (ref 3.4–10.8)

## 2024-05-28 PROCEDURE — 99233 SBSQ HOSP IP/OBS HIGH 50: CPT | Performed by: STUDENT IN AN ORGANIZED HEALTH CARE EDUCATION/TRAINING PROGRAM

## 2024-05-28 PROCEDURE — 93005 ELECTROCARDIOGRAM TRACING: CPT

## 2024-05-28 PROCEDURE — 78452 HT MUSCLE IMAGE SPECT MULT: CPT

## 2024-05-28 PROCEDURE — 85520 HEPARIN ASSAY: CPT

## 2024-05-28 PROCEDURE — 25010000002 FUROSEMIDE PER 20 MG: Performed by: STUDENT IN AN ORGANIZED HEALTH CARE EDUCATION/TRAINING PROGRAM

## 2024-05-28 PROCEDURE — A9500 TC99M SESTAMIBI: HCPCS | Performed by: STUDENT IN AN ORGANIZED HEALTH CARE EDUCATION/TRAINING PROGRAM

## 2024-05-28 PROCEDURE — 0 TECHNETIUM SESTAMIBI: Performed by: STUDENT IN AN ORGANIZED HEALTH CARE EDUCATION/TRAINING PROGRAM

## 2024-05-28 PROCEDURE — 25010000002 HEPARIN (PORCINE) 25000-0.45 UT/250ML-% SOLUTION

## 2024-05-28 PROCEDURE — 93010 ELECTROCARDIOGRAM REPORT: CPT | Performed by: SPECIALIST

## 2024-05-28 PROCEDURE — 85027 COMPLETE CBC AUTOMATED: CPT | Performed by: STUDENT IN AN ORGANIZED HEALTH CARE EDUCATION/TRAINING PROGRAM

## 2024-05-28 PROCEDURE — 94799 UNLISTED PULMONARY SVC/PX: CPT

## 2024-05-28 PROCEDURE — 25010000002 REGADENOSON 0.4 MG/5ML SOLUTION: Performed by: STUDENT IN AN ORGANIZED HEALTH CARE EDUCATION/TRAINING PROGRAM

## 2024-05-28 PROCEDURE — 80048 BASIC METABOLIC PNL TOTAL CA: CPT | Performed by: STUDENT IN AN ORGANIZED HEALTH CARE EDUCATION/TRAINING PROGRAM

## 2024-05-28 PROCEDURE — 93017 CV STRESS TEST TRACING ONLY: CPT

## 2024-05-28 PROCEDURE — 93005 ELECTROCARDIOGRAM TRACING: CPT | Performed by: STUDENT IN AN ORGANIZED HEALTH CARE EDUCATION/TRAINING PROGRAM

## 2024-05-28 PROCEDURE — 94761 N-INVAS EAR/PLS OXIMETRY MLT: CPT

## 2024-05-28 RX ORDER — REGADENOSON 0.08 MG/ML
0.4 INJECTION, SOLUTION INTRAVENOUS
Status: COMPLETED | OUTPATIENT
Start: 2024-05-28 | End: 2024-05-28

## 2024-05-28 RX ORDER — DIGOXIN 125 MCG
125 TABLET ORAL
Status: DISCONTINUED | OUTPATIENT
Start: 2024-05-29 | End: 2024-05-28

## 2024-05-28 RX ORDER — DIGOXIN 125 MCG
125 TABLET ORAL
Status: DISCONTINUED | OUTPATIENT
Start: 2024-05-30 | End: 2024-05-30 | Stop reason: HOSPADM

## 2024-05-28 RX ORDER — DIGOXIN 250 MCG
250 TABLET ORAL EVERY 6 HOURS
Status: DISPENSED | OUTPATIENT
Start: 2024-05-28 | End: 2024-05-29

## 2024-05-28 RX ORDER — FUROSEMIDE 10 MG/ML
40 INJECTION INTRAMUSCULAR; INTRAVENOUS ONCE
Status: COMPLETED | OUTPATIENT
Start: 2024-05-28 | End: 2024-05-28

## 2024-05-28 RX ADMIN — OXAZEPAM 15 MG: 15 CAPSULE ORAL at 21:10

## 2024-05-28 RX ADMIN — GABAPENTIN 100 MG: 100 CAPSULE ORAL at 21:02

## 2024-05-28 RX ADMIN — HEPARIN SODIUM 14.09 UNITS/KG/HR: 10000 INJECTION, SOLUTION INTRAVENOUS at 09:33

## 2024-05-28 RX ADMIN — METOPROLOL TARTRATE 100 MG: 100 TABLET, FILM COATED ORAL at 21:02

## 2024-05-28 RX ADMIN — EMPAGLIFLOZIN 10 MG: 10 TABLET, FILM COATED ORAL at 12:28

## 2024-05-28 RX ADMIN — REGADENOSON 0.4 MG: 0.08 INJECTION, SOLUTION INTRAVENOUS at 14:33

## 2024-05-28 RX ADMIN — TECHNETIUM TC 99M SESTAMIBI 1 DOSE: 1 INJECTION INTRAVENOUS at 14:33

## 2024-05-28 RX ADMIN — GABAPENTIN 100 MG: 100 CAPSULE ORAL at 09:27

## 2024-05-28 RX ADMIN — FUROSEMIDE 40 MG: 10 INJECTION, SOLUTION INTRAMUSCULAR; INTRAVENOUS at 09:35

## 2024-05-28 RX ADMIN — Medication 10 ML: at 09:28

## 2024-05-28 RX ADMIN — TECHNETIUM TC 99M SESTAMIBI 1 DOSE: 1 INJECTION INTRAVENOUS at 11:50

## 2024-05-28 RX ADMIN — OXAZEPAM 15 MG: 15 CAPSULE ORAL at 09:46

## 2024-05-28 RX ADMIN — Medication 10 ML: at 21:03

## 2024-05-28 RX ADMIN — SODIUM CHLORIDE 5 MG/HR: 900 INJECTION, SOLUTION INTRAVENOUS at 18:15

## 2024-05-28 RX ADMIN — DIGOXIN 250 MCG: 250 TABLET ORAL at 19:03

## 2024-05-28 NOTE — PROGRESS NOTES
HEPARIN INFUSION  Aliyah Adkins is a  84 y.o. female receiving heparin infusion.     Therapy for (VTE/Cardiac):   Cardiac  Patient Dosing Weight: 90.2 kg  Initial Bolus (Y/N):   Y  Any Bolus (Y/N):   Y        Signs or Symptoms of Bleeding: N    Cardiac or Other (Not VTE)   Initial Bolus: 60 units/kg (Max 4,000 units)  Initial rate: 12 units/kg/hr (Max 1,000 units/hr)   Anti Xa Rebolus Infusion Hold time Change infusion Dose (Units/kg/hr) Next Anti Xa or aPTT Level Due   < 0.11 50 Units/kg  (4000 Units Max) None Increase by  3 Units/kg/hr 6 hours   0.11- 0.19 25 Units/kg  (2000 Units Max) None Increase by  2 Units/kg/hr 6 hours   0.2 - 0.29 0 None Increase by  1 Units/kg/hr 6 hours   0.3 - 0.5 0 None No Change 6 hours (after 2 consecutive levels in range check q24h @0700)   0.51 - 0.6 0 None Decrease by  1 Units/kg/hr 6 hours   0.61 - 0.8 0 30 Minutes Decrease by  2 Units/kg/hr 6 hours   0.81 - 1 0 60 Minutes Decrease by  3 Units/kg/hr 6 hours   >1 0 Hold  After Anti Xa less than 0.5 decrease previous rate by  4 Units/kg/hr  Every 2 hours until Anti Xa  less than 0.5 then when infusion restarts in 6 hours     Recommend anti-Xa every 6 hours.          Date   Time   Anti-Xa Current Rate (Unit/kg/hr) Bolus   (Units) Rate Change   (Unit/kg/hr) New Rate (Unit/kg/hr) Next   Anti-Xa Comments  Pump Check Daily   5/26 1646 Drawn - 4000 +11.09 11.09 2300 No s/s of bleeding per nurse Kendal.    5/27 0110 0.22 11.09 - +1 12.09 0700 Discussed rate increase with ANDRE Krueger. No s/s of bleeding.    5/27 0718 0.28 12.09 - +1 13.09 1500 Discussed rate change and no s/s bleeding with Aury POWELL.    5/27 1433 0.28 13.09 - +1 14.09 2130 Discussed rate change and no s/s bleeding with Aury POWELL.    5/27 2130 0.34 14.09 - - 14.09 0330 Therapeutic  x 1. No s/s of bleeding per ANDRE Krueger.    5/28 0327 0.47 14.09 - - 14.09 1000 Therapeutic x 2. No s/s of bleeding per ANDRE Krueger. Will transition to [facility] monitoring.                                                                                                                                                                            Pharmacy will continue to follow anti-Xa results and monitor for signs and symptoms of bleeding or thrombosis.    Marcos Crockett, Sanjana  05/28/24 03:47 EDT

## 2024-05-28 NOTE — PLAN OF CARE
Goal Outcome Evaluation:  Patient resting in bed this shift. Denies any questions or concerns. No signs or symptoms of acute distress noted. Call light within reach. Plan of care ongoing.       Daily Care Plan Summary: Heart Failure    Diuretic in use (IV or PO):   N/A no orders        Daily weight (up or down):    gain: 0.4 lbs      Output > Intake (yes/no):Yes      O2 Requirements (current, any change?):  no change 3.5L NC      Symptoms noted with Activity (Respiratory Tolerance, functional state):     Activity intolerance      Anticipated Discharge Plans:  home

## 2024-05-28 NOTE — PROGRESS NOTES
[facility] HOSPITALIST PROGRESS NOTE     Patient Identification:  Name:  Aliyah Adkins  Age:  84 y.o.  Sex:  female  :  1940  MRN:  [id number]  Visit Number:  [id number]  ROOM: [address] [place] [address]     Primary Care Provider:  Jabari Keller MD    Length of stay in inpatient status:  2    Subjective     Chief Compliant:    Chief Complaint   Patient presents with    Shortness of Breath       History of Presenting Illness: Patient seen evaluated in follow-up for acute on chronic HFpEF with acute hypercapnic on chronic hypoxemic respiratory failure secondary to this with ADENIKE on CKD stage IIIa.  Patient time evaluation feeling well but still requiring some supplemental oxygen and getting dyspneic with any exertion.  Overnight patient with conversion to sinus rhythm from A-fib and Cardizem held.    Objective     Current Hospital Meds:  acetaminophen, 650 mg, Oral, Once  gabapentin, 100 mg, Oral, BID  metoprolol tartrate, 100 mg, Oral, BID  oxazepam, 15 mg, Oral, BID  senna-docusate sodium, 2 tablet, Oral, BID  sodium chloride, 10 mL, Intravenous, Q12H      [Held by provider] dilTIAZem, 5-15 mg/hr, Last Rate: Stopped (24 2200)  heparin, 14.09 Units/kg/hr (Dosing Weight), Last Rate: 14.09 Units/kg/hr (24 1531)  Pharmacy to Dose Heparin,       ----------------------------------------------------------------------------------------------------------------------  Vital Signs:  Temp:  [97.8 °F (36.6 °C)-99.8 °F (37.7 °C)] 97.8 °F (36.6 °C)  Heart Rate:  [70-95] 95  Resp:  [18-20] 18  BP: (125-142)/(46-79) 142/65  SpO2:  [90 %-99 %] 90 %  on  Flow (L/min):  [3.5] 3.5;   Device (Oxygen Therapy): nasal cannula  Body mass index is 32.32 kg/m².      Intake/Output Summary (Last 24 hours) at 2024  Last data filed at 2024 1827  Gross per 24 hour   Intake 840 ml   Output 400 ml   Net 440 ml     "  ----------------------------------------------------------------------------------------------------------------------  Physical exam:  Constitutional:  Well-developed and well-nourished.  No acute distress.      HENT:  Head:  Normocephalic and atraumatic.  Mouth:  Moist mucous membranes.    Eyes:  Conjunctivae and EOM are normal. No scleral icterus.    Cardiovascular:  Normal rate, regular rhythm and normal heart sounds with no murmur.  Pulmonary/Chest:  No respiratory distress, no wheezes, no crackles, with diminished breath sounds at the bases.  Abdominal:  Soft.  Bowel sounds are normal.  No distension and no tenderness.   Musculoskeletal:  No tenderness and no deformity.  No red or swollen joints anywhere.  Functional ROM intact.   Neurological:  Alert and oriented to person, place, and time.  No cranial nerve deficit.  No tongue deviation.  No facial droop.  No slurred speech. Intact Sensation throughout  Skin:  Skin is warm and dry. No rash or lesion noted. No pallor.   Peripheral vascular:  Pulses in all 4 extremities with no clubbing, no cyanosis, trace edema.  Psychiatric: Appropriate mood and affect, pleasant.   ----------------------------------------------------------------------------------------------------------------------  WBC/HGB/HCT/PLT   9.17/11.3/35.7/260 (05/27 0015)  BUN/CREAT/GLUC/ALT/AST/MARIE/LIP    31/1.82/126/--/--/--/-- (05/27 0015)  ANGELIA - Na/K/Cl/CO2: 144/4.2/103/31.2* (05/27 0015)        No results found for: \"URINECX\"  No results found for: \"BLOODCX\"    I have personally looked at the labs and they are summarized above.  ----------------------------------------------------------------------------------------------------------------------  Detailed radiology reports for the last 24 hours:  No radiology results for the last day  Assessment & Plan      Acute on chronic HFpEF  Elevated troponin 2/2 above  Severe pulmonary hypertension  Acute hypercapnic on chronic hypoxemic respiratory " failure    -Recent echo in August 2023 with EF 61 to 65% with borderline LVH and grade 1 diastolic dysfunction with severe pulmonary hypertension.    -Repeat echocardiogram obtained which shows EF of 66 to 70% with moderate concentric hypertrophy and normal diastolic function with moderate mitral valve regurg and moderate tricuspid valve regurg with moderate pulmonary hypertension with RVSP 45 to 55 mmHg.    -Continue with diuretic therapy with Bumex    -Initially on BiPAP in the emergency department now transitioned off and tolerating well on nasal cannula 3.5 L.    -Cardiology seen and evaluated and planning for stress test tomorrow AM.    ADENIKE on CKD stage IIIa    -Likely secondary to cardiorenal syndrome with decompensated heart failure, continue to access above.    Hypertension    -Continue home metoprolol, holding losartan secondary to ADENIKE    Macrocytic anemia    Elevated liver enzymes    Copied text in portions of the note has been reviewed and is accurate as of 05/27/24    VTE Prophylaxis:   Mechanical Order History:        Ordered        05/25/24 1455  Place Sequential Compression Device  Once            05/25/24 1455  Maintain Sequential Compression Device  Continuous                          Pharmalogical Order History:        Ordered     Dose Route Frequency Stop    05/26/24 1613  heparin (porcine) 5000 UNIT/ML injection 4,000 Units         4,000 Units IV Once 05/26/24 1659    05/26/24 1613  heparin 89228 units/250 mL (100 units/mL) in 0.45 % NaCl infusion  12.7 mL/hr         14.09 Units/kg/hr (Dosing Weight) IV Titrated --    05/26/24 1613  heparin (porcine) 5000 UNIT/ML injection 4,000 Units  Status:  Discontinued         4,000 Units IV As Needed 05/26/24 1644    05/26/24 1613  heparin (porcine) 5000 UNIT/ML injection 2,000 Units  Status:  Discontinued         2,000 Units IV As Needed 05/26/24 1644    05/26/24 1613  Pharmacy to Dose Heparin         -- XX Continuous PRN --    05/25/24 1505  Enoxaparin  Sodium (LOVENOX) syringe 30 mg  Status:  Discontinued         30 mg SC Nightly 05/26/24 1613    05/25/24 1455  Pharmacy to Dose enoxaparin (LOVENOX)  Status:  Discontinued        Question:  Indication of use  Answer:  Prophylaxis    -- XX Continuous PRN 05/25/24 1503                    Disposition ending clinical course but likely home in the next 48 hours.    Tyson Ruiz DO  [facility] Hospitalist  05/27/24  20:42 EDT

## 2024-05-28 NOTE — CONSULTS
Heart Failure Education Consult    84 y.o. female PMHx: HTN/HLD, arthritis  Active Hospital Problems    Diagnosis     **Acute CHF     Coronary artery disease involving native coronary artery of native heart        Social History     Socioeconomic History    Marital status:    Tobacco Use    Smoking status: Former     Current packs/day: 0.00     Average packs/day: 1 pack/day for 26.0 years (26.0 ttl pk-yrs)     Types: Cigarettes     Start date:      Quit date:      Years since quittin.4    Smokeless tobacco: Never   Vaping Use    Vaping status: Never Used   Substance and Sexual Activity    Alcohol use: Never    Drug use: Never       Type of Heart Failure: Diastolic     Length of diagnosis: New Diagnosis      Current HF knowledge: poor     Have you had HF education/teaching in the past? No  Do you check your weight daily? No    Current weight        24  0500 24  0500   Weight: 88.1 kg (194 lb 3.6 oz) 88.5 kg (195 lb 1.7 oz)          Most recent   Results for orders placed during the hospital encounter of 24    Adult Transthoracic Echo Complete W/ Cont if Necessary Per Protocol    Interpretation Summary    Left ventricular systolic function is hyperdynamic (EF > 70%). Calculated left ventricular EF = 71% Left ventricular ejection fraction appears to be 66 - 70%.    Left ventricular wall thickness is consistent with moderate concentric hypertrophy.    Left ventricular diastolic function was normal.    Moderate mitral valve regurgitation is present.    Moderate tricuspid valve regurgitation is present.    Estimated right ventricular systolic pressure from tricuspid regurgitation is moderately elevated (45-55 mmHg).    Moderate pulmonary hypertension is present.          Most recent ProBNP   Lab Results   Component Value Date    PROBNP 2,327.0 (H) 2024      Most recent ReDS 44% Date 24      Edema Improving        Shortness of Breath: Improving     Number of pillows used to  sleep at night: 2  Current HF zone  green    Medications Prior to Admission   Medication Sig Dispense Refill Last Dose    celecoxib (CeleBREX) 100 MG capsule Take 1 capsule by mouth 2 (Two) Times a Day As Needed for Mild Pain.   5/25/2024    gabapentin (NEURONTIN) 100 MG capsule Take 1 capsule by mouth 2 (Two) Times a Day.   5/25/2024    losartan (COZAAR) 100 MG tablet Take 1 tablet by mouth Daily.   5/25/2024    metoprolol tartrate (LOPRESSOR) 50 MG tablet Take 1 tablet by mouth 2 (Two) Times a Day.   5/25/2024    oxazepam (SERAX) 15 MG capsule Take 1 capsule by mouth 2 (Two) Times a Day.   5/25/2024       Barriers to learning: Other none identified      Materials Provided:HF Action Plan, Daily Weight Monitoring , and 2 Gm Na+ diet       Unable to provide heart failure education today:  []  Patient/family refused   []  Not available  []  Not able to participate   []  Other:            Referral candidate for HF Clinic:Yes      Thank you for this consult. Please let me know if I can be of any assistance with HF education for this patient.    30 minutes was spent on this visit    Electronically signed by,   Nina Nguyen, RN ,DNP, MSN, CHFN  05/28/24 08:33 EDT

## 2024-05-28 NOTE — PROGRESS NOTES
LOS: 3 days     Name: Aliyah Adkins  Age/Sex: 84 y.o. female  :  1940        PCP: Jabari Keller MD    Principal Problem:    Acute CHF  Active Problems:    Coronary artery disease involving native coronary artery of native heart      Admission Information: Aliyah Adkins is a 84 y.o. female with hypertension, pulmonary hypertension, and hyperlipidemia.    Chief Complaint: Shortness of breath    Interval history: Hemodynamically stable overnight.  Creatinine improving.    Subjective     Patient is awake in bed at the time of our visit today.  She reports that she is not having any trouble breathing; she does not sense palpitations, nor is she having any chest pain.  She understands and agrees to the plan for stress test today.    Vital Signs  Vital Signs (last 72 hrs)          0700   0659  07 0659  07 0659  07 0859   Most Recent      Temp (°F) 97.6 -  99.1    98.1 -  99.2    97.8 -  99.8       98.5 (36.9)  0644    Heart Rate 69 -  96    70 -  130    69 -  95       69  0644    Resp 16 -      18 -  20    18 -  19       18  0644    /58 -  171/89    117/51 -  142/79    117/53 -  147/57       147/57  0644    SpO2 (%) 90 -  99    90 -  99    90 -  98       95  0644    Flow (L/min) 2 -  3.5      3.5    3 -  3.5       3  0616    Oxygen Concentration (%)   32        32       32  0017          Temp:  [97.8 °F (36.6 °C)-99.8 °F (37.7 °C)] 98.5 °F (36.9 °C)  Heart Rate:  [69-95] 69  Resp:  [18-19] 18  BP: (117-147)/(53-79) 147/57  Body mass index is 32.47 kg/m².      Intake/Output Summary (Last 24 hours) at 2024 0859  Last data filed at 2024 1827  Gross per 24 hour   Intake 840 ml   Output 400 ml   Net 440 ml       Vitals and nursing note reviewed.   Constitutional:       Appearance: Not in distress. Obese.      Interventions: Nasal cannula in place.      Comments: 3 L/min   Eyes:      Conjunctiva/sclera:  Conjunctivae normal.   Pulmonary:      Effort: Pulmonary effort is normal.      Breath sounds: Normal breath sounds.   Cardiovascular:      Normal rate. Regular rhythm.      Murmurs: There is no murmur.   Edema:     Peripheral edema absent.   Skin:     General: Skin is warm and dry.   Neurological:      Mental Status: Alert.         Telemetry:  Sinus 70s     Results Review:     Results from last 7 days   Lab Units 05/28/24  0326 05/27/24  0015 05/26/24  0103 05/25/24  0854   WBC 10*3/mm3 7.73 9.17 8.71 9.61   HEMOGLOBIN g/dL 10.3* 11.3* 10.6* 11.0*   PLATELETS 10*3/mm3 217 260 257 303     Results from last 7 days   Lab Units 05/28/24  0327 05/27/24  0015 05/26/24  0103 05/25/24  0854   SODIUM mmol/L 144 144 144 145   POTASSIUM mmol/L 4.2 4.2 4.6 4.8   CHLORIDE mmol/L 102 103 106 108*   CO2 mmol/L 30.9* 31.2* 25.9 24.5   BUN mg/dL 26* 31* 30* 31*   CREATININE mg/dL 1.43* 1.82* 1.81* 1.99*   CALCIUM mg/dL 9.1 9.4 9.1 9.0   GLUCOSE mg/dL 132* 126* 111* 174*     Results from last 7 days   Lab Units 05/26/24  0335 05/26/24  0103 05/25/24  0854   HSTROP T ng/L 16* 18* 24*       Results from last 7 days   Lab Units 05/26/24  1649   INR  0.93       I reviewed the patient's new clinical results.  I reviewed the patient's new imaging results and agree with the interpretation.  I personally viewed and interpreted the patient's EKG/Telemetry data      Medication Review:   acetaminophen, 650 mg, Oral, Once  gabapentin, 100 mg, Oral, BID  metoprolol tartrate, 100 mg, Oral, BID  oxazepam, 15 mg, Oral, BID  senna-docusate sodium, 2 tablet, Oral, BID  sodium chloride, 10 mL, Intravenous, Q12H      [Held by provider] dilTIAZem, 5-15 mg/hr, Last Rate: Stopped (05/26/24 2200)  heparin, 14.09 Units/kg/hr (Dosing Weight), Last Rate: 14.09 Units/kg/hr (05/27/24 1531)  Pharmacy to Dose Heparin,         Assessment:  Acute HFpEF  ADENIKE  Paroxysmal atrial fibrillation  Troponin elevation  Essential hypertension  Pulmonary  hypertension      Recommendations:  Stress test ordered for today.  As creatinine is improving we will slowly initiate GDMT for heart failure  Creatinine down to 1.4 today.  Affecting medication choices  Back in sinus rhythm.  Currently anticoagulated with heparin.  Will transition to Eliquis once decision is made about indication for invasive coronary angiogram.  Ischemic evaluation in progress  Quite at goal, GDMT for heart failure will alleviate  May need Revatio.    Patient seen and evaluated with Dr. Flores.  Plan of care reflects his recommendations.    I discussed the patients findings and my recommendations with patient.      Electronically signed by LATONYA Larkin, 05/28/24, 9:21 AM EDT.

## 2024-05-28 NOTE — CASE MANAGEMENT/SOCIAL WORK
Discharge Planning Assessment   [place]     Patient Name: Aliyah Adkins  MRN: [id number]  Today's Date: 5/28/2024    Admit Date: 5/25/2024    Plan: SS received consult per Case Management for discharge planning.  SS noted ED Case Management completed initial referral.  SS spoke with pt at bedside.  Pt lives at home alone and plans to return home at discharge.  Pt's PCP is Dr Keller.  Pt utilizes [facility]. Pt currently does  SS will follow and assist with discharge needs.       Discharge Plan       Row Name 05/28/24 1521       Plan    Plan SS received consult per Case Management for discharge planning.  SS noted ED Case Management completed initial referral.  SS spoke with pt at bedside.  Pt lives at home alone and plans to return home at discharge.  Pt's PCP is Dr Keller.  Pt utilizes [facility]. Pt currently does  SS will follow and assist with discharge needs.                  Continued Care and Services - Admitted Since 5/25/2024    No active coordination exists for this encounter.       Expected Discharge Date and Time       Expected Discharge Date Expected Discharge Time    May 28, 2024               Nilda Amaral, DOMINICW

## 2024-05-28 NOTE — H&P (VIEW-ONLY)
LOS: 3 days     Name: Aliyah Adkins  Age/Sex: 84 y.o. female  :  1940        PCP: Jabari Keller MD    Principal Problem:    Acute CHF  Active Problems:    Coronary artery disease involving native coronary artery of native heart      Admission Information: Aliyah Adkins is a 84 y.o. female with hypertension, pulmonary hypertension, and hyperlipidemia.    Chief Complaint: Shortness of breath    Interval history: Hemodynamically stable overnight.  Creatinine improving.    Subjective     Patient is awake in bed at the time of our visit today.  She reports that she is not having any trouble breathing; she does not sense palpitations, nor is she having any chest pain.  She understands and agrees to the plan for stress test today.    Vital Signs  Vital Signs (last 72 hrs)          0700   0659  07 0659  07 0659  07 0859   Most Recent      Temp (°F) 97.6 -  99.1    98.1 -  99.2    97.8 -  99.8       98.5 (36.9)  0644    Heart Rate 69 -  96    70 -  130    69 -  95       69  0644    Resp 16 -      18 -  20    18 -  19       18  0644    /58 -  171/89    117/51 -  142/79    117/53 -  147/57       147/57  0644    SpO2 (%) 90 -  99    90 -  99    90 -  98       95  0644    Flow (L/min) 2 -  3.5      3.5    3 -  3.5       3  0616    Oxygen Concentration (%)   32        32       32  0017          Temp:  [97.8 °F (36.6 °C)-99.8 °F (37.7 °C)] 98.5 °F (36.9 °C)  Heart Rate:  [69-95] 69  Resp:  [18-19] 18  BP: (117-147)/(53-79) 147/57  Body mass index is 32.47 kg/m².      Intake/Output Summary (Last 24 hours) at 2024 0859  Last data filed at 2024 1827  Gross per 24 hour   Intake 840 ml   Output 400 ml   Net 440 ml       Vitals and nursing note reviewed.   Constitutional:       Appearance: Not in distress. Obese.      Interventions: Nasal cannula in place.      Comments: 3 L/min   Eyes:      Conjunctiva/sclera:  Conjunctivae normal.   Pulmonary:      Effort: Pulmonary effort is normal.      Breath sounds: Normal breath sounds.   Cardiovascular:      Normal rate. Regular rhythm.      Murmurs: There is no murmur.   Edema:     Peripheral edema absent.   Skin:     General: Skin is warm and dry.   Neurological:      Mental Status: Alert.         Telemetry:  Sinus 70s     Results Review:     Results from last 7 days   Lab Units 05/28/24  0326 05/27/24  0015 05/26/24  0103 05/25/24  0854   WBC 10*3/mm3 7.73 9.17 8.71 9.61   HEMOGLOBIN g/dL 10.3* 11.3* 10.6* 11.0*   PLATELETS 10*3/mm3 217 260 257 303     Results from last 7 days   Lab Units 05/28/24  0327 05/27/24  0015 05/26/24  0103 05/25/24  0854   SODIUM mmol/L 144 144 144 145   POTASSIUM mmol/L 4.2 4.2 4.6 4.8   CHLORIDE mmol/L 102 103 106 108*   CO2 mmol/L 30.9* 31.2* 25.9 24.5   BUN mg/dL 26* 31* 30* 31*   CREATININE mg/dL 1.43* 1.82* 1.81* 1.99*   CALCIUM mg/dL 9.1 9.4 9.1 9.0   GLUCOSE mg/dL 132* 126* 111* 174*     Results from last 7 days   Lab Units 05/26/24  0335 05/26/24  0103 05/25/24  0854   HSTROP T ng/L 16* 18* 24*       Results from last 7 days   Lab Units 05/26/24  1649   INR  0.93       I reviewed the patient's new clinical results.  I reviewed the patient's new imaging results and agree with the interpretation.  I personally viewed and interpreted the patient's EKG/Telemetry data      Medication Review:   acetaminophen, 650 mg, Oral, Once  gabapentin, 100 mg, Oral, BID  metoprolol tartrate, 100 mg, Oral, BID  oxazepam, 15 mg, Oral, BID  senna-docusate sodium, 2 tablet, Oral, BID  sodium chloride, 10 mL, Intravenous, Q12H      [Held by provider] dilTIAZem, 5-15 mg/hr, Last Rate: Stopped (05/26/24 2200)  heparin, 14.09 Units/kg/hr (Dosing Weight), Last Rate: 14.09 Units/kg/hr (05/27/24 1531)  Pharmacy to Dose Heparin,         Assessment:  Acute HFpEF  ADENIKE  Paroxysmal atrial fibrillation  Troponin elevation  Essential hypertension  Pulmonary  hypertension      Recommendations:  Stress test ordered for today.  As creatinine is improving we will slowly initiate GDMT for heart failure  Creatinine down to 1.4 today.  Affecting medication choices  Back in sinus rhythm.  Currently anticoagulated with heparin.  Will transition to Eliquis once decision is made about indication for invasive coronary angiogram.  Ischemic evaluation in progress  Quite at goal, GDMT for heart failure will alleviate  May need Revatio.    Patient seen and evaluated with Dr. Flores.  Plan of care reflects his recommendations.    I discussed the patients findings and my recommendations with patient.      Electronically signed by LAOTNYA Larkin, 05/28/24, 9:21 AM EDT.

## 2024-05-28 NOTE — PLAN OF CARE
Goal Outcome Evaluation:      Patient has been resting in bed. Patient converted to afib RVR this shift; LATONYA Mehta made aware, see orders. IV Cardizem currently infusing per order. No s/s of acute distress noted at this time. Plan of care ongoing.

## 2024-05-28 NOTE — DISCHARGE INSTRUCTIONS
You have been enrolled into the transition from hospital to home program.  A nurse (Amanda) will be contacting you after you discharge to home to set up a time to come out to your home for a follow-up visit.  If you have any questions or concerns you can call this number anytime and a nurse will contact you:  [facility]  [phone].

## 2024-05-29 LAB
ANION GAP SERPL CALCULATED.3IONS-SCNC: 8.6 MMOL/L (ref 5–15)
BUN SERPL-MCNC: 28 MG/DL (ref 8–23)
BUN/CREAT SERPL: 20.7 (ref 7–25)
CALCIUM SPEC-SCNC: 9.5 MG/DL (ref 8.6–10.5)
CHLORIDE SERPL-SCNC: 100 MMOL/L (ref 98–107)
CO2 SERPL-SCNC: 34.4 MMOL/L (ref 22–29)
CREAT SERPL-MCNC: 1.35 MG/DL (ref 0.57–1)
EGFRCR SERPLBLD CKD-EPI 2021: 39.3 ML/MIN/1.73
GLUCOSE SERPL-MCNC: 126 MG/DL (ref 65–99)
MAGNESIUM SERPL-MCNC: 1.8 MG/DL (ref 1.6–2.4)
POTASSIUM SERPL-SCNC: 4.1 MMOL/L (ref 3.5–5.2)
QT INTERVAL: 270 MS
QT INTERVAL: 322 MS
QT INTERVAL: 350 MS
QT INTERVAL: 350 MS
QTC INTERVAL: 404 MS
QTC INTERVAL: 406 MS
QTC INTERVAL: 422 MS
QTC INTERVAL: 446 MS
SODIUM SERPL-SCNC: 143 MMOL/L (ref 136–145)
UFH PPP CHRO-ACNC: 0.39 IU/ML (ref 0.3–0.7)

## 2024-05-29 PROCEDURE — 85520 HEPARIN ASSAY: CPT | Performed by: STUDENT IN AN ORGANIZED HEALTH CARE EDUCATION/TRAINING PROGRAM

## 2024-05-29 PROCEDURE — 25010000002 HEPARIN (PORCINE) PER 1000 UNITS: Performed by: INTERNAL MEDICINE

## 2024-05-29 PROCEDURE — 93010 ELECTROCARDIOGRAM REPORT: CPT | Performed by: SPECIALIST

## 2024-05-29 PROCEDURE — 93458 L HRT ARTERY/VENTRICLE ANGIO: CPT | Performed by: INTERNAL MEDICINE

## 2024-05-29 PROCEDURE — 4A023N7 MEASUREMENT OF CARDIAC SAMPLING AND PRESSURE, LEFT HEART, PERCUTANEOUS APPROACH: ICD-10-PCS | Performed by: INTERNAL MEDICINE

## 2024-05-29 PROCEDURE — 25010000002 HEPARIN (PORCINE) 25000-0.45 UT/250ML-% SOLUTION

## 2024-05-29 PROCEDURE — 93005 ELECTROCARDIOGRAM TRACING: CPT | Performed by: STUDENT IN AN ORGANIZED HEALTH CARE EDUCATION/TRAINING PROGRAM

## 2024-05-29 PROCEDURE — 25810000003 SODIUM CHLORIDE 0.9 % SOLUTION: Performed by: INTERNAL MEDICINE

## 2024-05-29 PROCEDURE — 83735 ASSAY OF MAGNESIUM: CPT | Performed by: STUDENT IN AN ORGANIZED HEALTH CARE EDUCATION/TRAINING PROGRAM

## 2024-05-29 PROCEDURE — B2151ZZ FLUOROSCOPY OF LEFT HEART USING LOW OSMOLAR CONTRAST: ICD-10-PCS | Performed by: INTERNAL MEDICINE

## 2024-05-29 PROCEDURE — C1894 INTRO/SHEATH, NON-LASER: HCPCS | Performed by: INTERNAL MEDICINE

## 2024-05-29 PROCEDURE — 80048 BASIC METABOLIC PNL TOTAL CA: CPT | Performed by: STUDENT IN AN ORGANIZED HEALTH CARE EDUCATION/TRAINING PROGRAM

## 2024-05-29 PROCEDURE — 99232 SBSQ HOSP IP/OBS MODERATE 35: CPT | Performed by: STUDENT IN AN ORGANIZED HEALTH CARE EDUCATION/TRAINING PROGRAM

## 2024-05-29 PROCEDURE — 94761 N-INVAS EAR/PLS OXIMETRY MLT: CPT

## 2024-05-29 PROCEDURE — 25510000001 IOPAMIDOL PER 1 ML: Performed by: INTERNAL MEDICINE

## 2024-05-29 PROCEDURE — C1769 GUIDE WIRE: HCPCS | Performed by: INTERNAL MEDICINE

## 2024-05-29 PROCEDURE — 94660 CPAP INITIATION&MGMT: CPT

## 2024-05-29 PROCEDURE — B2111ZZ FLUOROSCOPY OF MULTIPLE CORONARY ARTERIES USING LOW OSMOLAR CONTRAST: ICD-10-PCS | Performed by: INTERNAL MEDICINE

## 2024-05-29 RX ORDER — LIDOCAINE HYDROCHLORIDE 20 MG/ML
INJECTION, SOLUTION INFILTRATION; PERINEURAL
Status: DISCONTINUED | OUTPATIENT
Start: 2024-05-29 | End: 2024-05-29 | Stop reason: HOSPADM

## 2024-05-29 RX ORDER — ACETAMINOPHEN 325 MG/1
650 TABLET ORAL EVERY 4 HOURS PRN
Status: DISCONTINUED | OUTPATIENT
Start: 2024-05-29 | End: 2024-05-30 | Stop reason: HOSPADM

## 2024-05-29 RX ORDER — NITROGLYCERIN 0.4 MG/1
0.4 TABLET SUBLINGUAL
Status: DISCONTINUED | OUTPATIENT
Start: 2024-05-29 | End: 2024-05-29

## 2024-05-29 RX ORDER — SODIUM CHLORIDE 9 MG/ML
100 INJECTION, SOLUTION INTRAVENOUS CONTINUOUS
Status: ACTIVE | OUTPATIENT
Start: 2024-05-29 | End: 2024-05-29

## 2024-05-29 RX ORDER — SODIUM CHLORIDE 9 MG/ML
INJECTION, SOLUTION INTRAVENOUS
Status: COMPLETED | OUTPATIENT
Start: 2024-05-29 | End: 2024-05-29

## 2024-05-29 RX ORDER — VERAPAMIL HYDROCHLORIDE 2.5 MG/ML
INJECTION, SOLUTION INTRAVENOUS
Status: DISCONTINUED | OUTPATIENT
Start: 2024-05-29 | End: 2024-05-29 | Stop reason: HOSPADM

## 2024-05-29 RX ORDER — HEPARIN SODIUM 1000 [USP'U]/ML
INJECTION, SOLUTION INTRAVENOUS; SUBCUTANEOUS
Status: DISCONTINUED | OUTPATIENT
Start: 2024-05-29 | End: 2024-05-29 | Stop reason: HOSPADM

## 2024-05-29 RX ADMIN — DOCUSATE SODIUM 50 MG AND SENNOSIDES 8.6 MG 2 TABLET: 8.6; 5 TABLET, FILM COATED ORAL at 21:52

## 2024-05-29 RX ADMIN — DIGOXIN 250 MCG: 250 TABLET ORAL at 06:00

## 2024-05-29 RX ADMIN — OXAZEPAM 15 MG: 15 CAPSULE ORAL at 13:38

## 2024-05-29 RX ADMIN — DIGOXIN 250 MCG: 250 TABLET ORAL at 00:37

## 2024-05-29 RX ADMIN — OXAZEPAM 15 MG: 15 CAPSULE ORAL at 20:59

## 2024-05-29 RX ADMIN — METOPROLOL TARTRATE 100 MG: 100 TABLET, FILM COATED ORAL at 13:25

## 2024-05-29 RX ADMIN — SODIUM CHLORIDE 100 ML/HR: 9 INJECTION, SOLUTION INTRAVENOUS at 13:44

## 2024-05-29 RX ADMIN — GABAPENTIN 100 MG: 100 CAPSULE ORAL at 13:24

## 2024-05-29 RX ADMIN — HEPARIN SODIUM 14.09 UNITS/KG/HR: 10000 INJECTION, SOLUTION INTRAVENOUS at 06:00

## 2024-05-29 RX ADMIN — Medication 10 ML: at 21:52

## 2024-05-29 RX ADMIN — METOPROLOL TARTRATE 100 MG: 100 TABLET, FILM COATED ORAL at 21:52

## 2024-05-29 RX ADMIN — EMPAGLIFLOZIN 10 MG: 10 TABLET, FILM COATED ORAL at 13:24

## 2024-05-29 RX ADMIN — GABAPENTIN 100 MG: 100 CAPSULE ORAL at 21:52

## 2024-05-29 NOTE — PROGRESS NOTES
HEPARIN INFUSION  Aliyah Adkins is a  84 y.o. female receiving heparin infusion.     Therapy for (VTE/Cardiac):   Cardiac  Patient Dosing Weight: 90.2 kg  Initial Bolus (Y/N):   Y  Any Bolus (Y/N):   Y        Signs or Symptoms of Bleeding: N    Cardiac or Other (Not VTE)   Initial Bolus: 60 units/kg (Max 4,000 units)  Initial rate: 12 units/kg/hr (Max 1,000 units/hr)   Anti Xa Rebolus Infusion Hold time Change infusion Dose (Units/kg/hr) Next Anti Xa or aPTT Level Due   < 0.11 50 Units/kg  (4000 Units Max) None Increase by  3 Units/kg/hr 6 hours   0.11- 0.19 25 Units/kg  (2000 Units Max) None Increase by  2 Units/kg/hr 6 hours   0.2 - 0.29 0 None Increase by  1 Units/kg/hr 6 hours   0.3 - 0.5 0 None No Change 6 hours (after 2 consecutive levels in range check q24h @0700)   0.51 - 0.6 0 None Decrease by  1 Units/kg/hr 6 hours   0.61 - 0.8 0 30 Minutes Decrease by  2 Units/kg/hr 6 hours   0.81 - 1 0 60 Minutes Decrease by  3 Units/kg/hr 6 hours   >1 0 Hold  After Anti Xa less than 0.5 decrease previous rate by  4 Units/kg/hr  Every 2 hours until Anti Xa  less than 0.5 then when infusion restarts in 6 hours     Recommend anti-Xa every 6 hours.          Date   Time   Anti-Xa Current Rate (Unit/kg/hr) Bolus   (Units) Rate Change   (Unit/kg/hr) New Rate (Unit/kg/hr) Next   Anti-Xa Comments  Pump Check Daily   5/26 1646 Drawn - 4000 +11.09 11.09 2300 No s/s of bleeding per nurse Kendal.    5/27 0110 0.22 11.09 - +1 12.09 0700 Discussed rate increase with ANDRE Krueger. No s/s of bleeding.    5/27 0718 0.28 12.09 - +1 13.09 1500 Discussed rate change and no s/s bleeding with Aury POWELL.    5/27 1433 0.28 13.09 - +1 14.09 2130 Discussed rate change and no s/s bleeding with Aury POWELL.    5/27 2130 0.34 14.09 - - 14.09 0330 Therapeutic  x 1. No s/s of bleeding per ANDRE Krueger.    5/28 0327 0.47 14.09 - - 14.09 1000 Therapeutic x 2. No s/s of bleeding per ANDRE Krueger. Will transition to Qa monitoring.    5/29 0630 0.39 14.09 - - 14.09  0700 Therapeutic , no rate change, no s/s bleeding, d/w  Sima POWELL                                                                                                                                                                Pharmacy will continue to follow anti-Xa results and monitor for signs and symptoms of bleeding or thrombosis.      nils

## 2024-05-29 NOTE — PROGRESS NOTES
[facility] HOSPITALIST PROGRESS NOTE     Patient Identification:  Name:  Aliyah Adkins  Age:  84 y.o.  Sex:  female  :  1940  MRN:  [id number]  Visit Number:  [id number]  ROOM: [address] [place] [address]     Primary Care Provider:  Jabari Keller MD    Length of stay in inpatient status:  3    Subjective     Chief Compliant:    Chief Complaint   Patient presents with    Shortness of Breath       History of Presenting Illness: Patient seen evaluated in follow-up for acute on chronic HFpEF with acute hypercapnic on chronic hypoxemic respiratory failure secondary to this with ADENIKE on CKD stage IIIa.  Patient time evaluation feeling well but still requiring supplemental oxygen and getting dyspneic with any exertion.  Patient is a with stress test and with recurrence of A-fib with RVR post stress test and resumed on Cardizem.  Objective     Current Hospital Meds:  acetaminophen, 650 mg, Oral, Once  [START ON 2024] digoxin, 125 mcg, Oral, Daily  digoxin, 250 mcg, Oral, Q6H  empagliflozin, 10 mg, Oral, Daily  gabapentin, 100 mg, Oral, BID  metoprolol tartrate, 100 mg, Oral, BID  oxazepam, 15 mg, Oral, BID  senna-docusate sodium, 2 tablet, Oral, BID  sodium chloride, 10 mL, Intravenous, Q12H      dilTIAZem, 5 mg/hr, Last Rate: 5 mg/hr (24 181)  heparin, 14.09 Units/kg/hr (Dosing Weight), Last Rate: 14.09 Units/kg/hr (24 0933)  Pharmacy to Dose Heparin,       ----------------------------------------------------------------------------------------------------------------------  Vital Signs:  Temp:  [97.5 °F (36.4 °C)-99.1 °F (37.3 °C)] 97.5 °F (36.4 °C)  Heart Rate:  [] 99  Resp:  [18-20] 18  BP: (117-157)/(53-92) 144/74  SpO2:  [90 %-98 %] 90 %  on  Flow (L/min):  [3-3.5] 3;   Device (Oxygen Therapy): nasal cannula  Body mass index is 32.47 kg/m².      Intake/Output Summary (Last 24 hours) at 2024  Last data filed at 2024 1815  Gross per 24 hour   Intake 360 ml   Output 1400 ml  "  Net -1040 ml      ----------------------------------------------------------------------------------------------------------------------  Physical exam:  Constitutional:  Well-developed and well-nourished.  No acute distress.      HENT:  Head:  Normocephalic and atraumatic.  Mouth:  Moist mucous membranes.    Eyes:  Conjunctivae and EOM are normal. No scleral icterus.    Cardiovascular:  Normal rate, regular rhythm and normal heart sounds with no murmur.  Pulmonary/Chest:  No respiratory distress, no wheezes, no crackles, with diminished breath sounds at the bases.  Abdominal:  Soft.  Bowel sounds are normal.  No distension and no tenderness.   Musculoskeletal:  No tenderness and no deformity.  No red or swollen joints anywhere.  Functional ROM intact.   Neurological:  Alert and oriented to person, place, and time.  No cranial nerve deficit.  No tongue deviation.  No facial droop.  No slurred speech. Intact Sensation throughout  Skin:  Skin is warm and dry. No rash or lesion noted. No pallor.   Peripheral vascular:  Pulses in all 4 extremities with no clubbing, no cyanosis, trace edema.  Psychiatric: Appropriate mood and affect, pleasant.   ----------------------------------------------------------------------------------------------------------------------  WBC/HGB/HCT/PLT   7.73/10.3/33.0/217 (05/28 0326)  BUN/CREAT/GLUC/ALT/AST/MARIE/LIP    26/1.43/132/--/--/--/-- (05/28 0327)  LYTES - Na/K/Cl/CO2: 144/4.2/102/30.9* (05/28 0327)        No results found for: \"URINECX\"  No results found for: \"BLOODCX\"    I have personally looked at the labs and they are summarized above.  ----------------------------------------------------------------------------------------------------------------------  Detailed radiology reports for the last 24 hours:  No radiology results for the last day  Assessment & Plan      Acute on chronic HFpEF  Elevated troponin 2/2 above  Severe pulmonary hypertension  Acute hypercapnic on chronic " hypoxemic respiratory failure    -Recent echo in August 2023 with EF 61 to 65% with borderline LVH and grade 1 diastolic dysfunction with severe pulmonary hypertension.    -Repeat echocardiogram obtained which shows EF of 66 to 70% with moderate concentric hypertrophy and normal diastolic function with moderate mitral valve regurg and moderate tricuspid valve regurg with moderate pulmonary hypertension with RVSP 45 to 55 mmHg.    -Continue with diuretic therapy with furosemide today    -Initially on BiPAP in the emergency department now transitioned off and tolerating well on nasal cannula 3 L.    -Cardiology seen and evaluated and Patient taken for stress test today.  Results still pending at time of this documentation.    -Patient with recurrence of RVR post stress test and resumed on Cardizem drip.    ADENIKE on CKD stage IIIa    -Likely secondary to cardiorenal syndrome with decompensated heart failure, continue to access above.    Hypertension    -Continue home metoprolol, holding losartan secondary to ADENIKE    Macrocytic anemia  Elevated liver enzymes    Copied text in portions of the note has been reviewed and is accurate as of 05/28/24    VTE Prophylaxis:   Mechanical Order History:        Ordered        05/25/24 1455  Place Sequential Compression Device  Once            05/25/24 1455  Maintain Sequential Compression Device  Continuous                          Pharmalogical Order History:        Ordered     Dose Route Frequency Stop    05/26/24 1613  heparin (porcine) 5000 UNIT/ML injection 4,000 Units         4,000 Units IV Once 05/26/24 1659    05/26/24 1613  heparin 99840 units/250 mL (100 units/mL) in 0.45 % NaCl infusion  12.7 mL/hr         14.09 Units/kg/hr (Dosing Weight) IV Titrated --    05/26/24 1613  heparin (porcine) 5000 UNIT/ML injection 4,000 Units  Status:  Discontinued         4,000 Units IV As Needed 05/26/24 1644    05/26/24 1613  heparin (porcine) 5000 UNIT/ML injection 2,000 Units  Status:   Discontinued         2,000 Units IV As Needed 05/26/24 1644    05/26/24 1613  Pharmacy to Dose Heparin         -- XX Continuous PRN --    05/25/24 1505  Enoxaparin Sodium (LOVENOX) syringe 30 mg  Status:  Discontinued         30 mg SC Nightly 05/26/24 1613    05/25/24 1455  Pharmacy to Dose enoxaparin (LOVENOX)  Status:  Discontinued        Question:  Indication of use  Answer:  Prophylaxis    -- XX Continuous PRN 05/25/24 1505                    Disposition pending clinical course but possible home in the next 48 hours.    Tyson Ruiz DO  [facility]  05/28/24  20:16 EDT

## 2024-05-29 NOTE — PHARMACY PATIENT ASSISTANCE
Pharmacy checked on price of Jardiance initiated inpatient. Per patient's plan, copay will be $47 for 1 month supply. No other issues identified at this time.    Thank you,    Nikita Vasquez, Pharmacy Intern  05/29/24  14:49 EDT

## 2024-05-29 NOTE — PLAN OF CARE
Pt rested in bed this shift without complaints. Pt had heart cath this shift. Right radial device removed per policy. No s/s of acute distress noted. VSS

## 2024-05-29 NOTE — PROGRESS NOTES
[facility] HOSPITALIST PROGRESS NOTE     Patient Identification:  Name:  Aliyah Adkins  Age:  84 y.o.  Sex:  female  :  1940  MRN:  [id number]  Visit Number:  [id number]  ROOM: [address] [place] [address]     Primary Care Provider:  Jabari Keller MD    Length of stay in inpatient status:  4    Subjective     Chief Compliant:    Chief Complaint   Patient presents with    Shortness of Breath       History of Presenting Illness: Patient seen evaluated in follow-up for acute on chronic HFpEF with acute hypercapnic on chronic hypoxemic respiratory failure secondary to this with ADENIKE on CKD stage IIIa.  Patient with stress test day prior with borderline positive status and cardiology elected to take for left heart cath today.  Left heart cath showing no intervenable disease and plan for medical management.  Patient still flipping back-and-forth between sinus rhythm and A-fib.    Objective     Current Hospital Meds:  acetaminophen, 650 mg, Oral, Once  [START ON 2024] digoxin, 125 mcg, Oral, Daily  empagliflozin, 10 mg, Oral, Daily  gabapentin, 100 mg, Oral, BID  metoprolol tartrate, 100 mg, Oral, BID  oxazepam, 15 mg, Oral, BID  senna-docusate sodium, 2 tablet, Oral, BID  sodium chloride, 10 mL, Intravenous, Q12H      heparin, 14.09 Units/kg/hr (Dosing Weight), Last Rate: Stopped (24 1130)  Pharmacy to Dose Heparin,       ----------------------------------------------------------------------------------------------------------------------  Vital Signs:  Temp:  [98.4 °F (36.9 °C)-99.4 °F (37.4 °C)] 98.4 °F (36.9 °C)  Heart Rate:  [70-94] 82  Resp:  [18-20] 20  BP: (118-162)/(46-63) 156/57  SpO2:  [93 %-98 %] 98 %  on  Flow (L/min):  [3] 3;   Device (Oxygen Therapy): nasal cannula  Body mass index is 32.22 kg/m².      Intake/Output Summary (Last 24 hours) at 2024  Last data filed at 2024 1300  Gross per 24 hour   Intake 258.19 ml   Output 1100 ml   Net -841.81 ml     "  ----------------------------------------------------------------------------------------------------------------------  Physical exam:  Constitutional:  Well-developed and well-nourished.  No acute distress.      HENT:  Head:  Normocephalic and atraumatic.  Mouth:  Moist mucous membranes.    Eyes:  Conjunctivae and EOM are normal. No scleral icterus.    Cardiovascular:  Normal rate, regular rhythm and normal heart sounds with no murmur.  Pulmonary/Chest:  No respiratory distress, no wheezes, no crackles, with diminished breath sounds at the bases.  Abdominal:  Soft.  Bowel sounds are normal.  No distension and no tenderness.   Musculoskeletal:  No tenderness and no deformity.  No red or swollen joints anywhere.  Functional ROM intact.   Neurological:  Alert and oriented to person, place, and time.  No cranial nerve deficit.  No tongue deviation.  No facial droop.  No slurred speech. Intact Sensation throughout  Skin:  Skin is warm and dry. No rash or lesion noted. No pallor.   Peripheral vascular:  Pulses in all 4 extremities with no clubbing, no cyanosis, trace edema.  Psychiatric: Appropriate mood and affect, pleasant.   ----------------------------------------------------------------------------------------------------------------------     BUN/CREAT/GLUC/ALT/AST/MARIE/LIP    28/1.35/126/--/--/--/-- (05/29 0103)  ANGELIA - Na/K/Cl/CO2: 143/4.1/100/34.4* (05/29 0103)        No results found for: \"URINECX\"  No results found for: \"BLOODCX\"    I have personally looked at the labs and they are summarized above.  ----------------------------------------------------------------------------------------------------------------------  Detailed radiology reports for the last 24 hours:  No radiology results for the last day  Assessment & Plan      Acute on chronic HFpEF  Elevated troponin 2/2 above  Severe pulmonary hypertension  Acute hypercapnic on chronic hypoxemic respiratory failure    -Recent echo in August 2023 with EF " 61 to 65% with borderline LVH and grade 1 diastolic dysfunction with severe pulmonary hypertension.    -Repeat echocardiogram obtained which shows EF of 66 to 70% with moderate concentric hypertrophy and normal diastolic function with moderate mitral valve regurg and moderate tricuspid valve regurg with moderate pulmonary hypertension with RVSP 45 to 55 mmHg.    -Holding diuretic therapy in the setting of left heart cath and contrast administration.    -Initially on BiPAP in the emergency department now transitioned off and tolerating well on nasal cannula 3 L.    -Cardiology seen and evaluated and Patient taken for stress test with borderline positive results and taken for left heart cath today with coronary disease noted but no significant stenosis necessitating stent placement.  Plan for medical management.    -Patient going in and out of sinus rhythm and A-fib but relatively staying rate controlled at rest.    -Continue digoxin.    ADENIKE on CKD stage IIIa    -Likely secondary to cardiorenal syndrome with decompensated heart failure, continue to treat as above.    Hypertension    -Continue home metoprolol, holding losartan secondary to ADENIKE    Macrocytic anemia  Elevated liver enzymes    Copied text in portions of the note has been reviewed and is accurate as of 05/29/24    VTE Prophylaxis:   Mechanical Order History:        Ordered        05/25/24 1455  Place Sequential Compression Device  Once            05/25/24 1455  Maintain Sequential Compression Device  Continuous                          Pharmalogical Order History:        Ordered     Dose Route Frequency Stop    05/26/24 1613  heparin (porcine) 5000 UNIT/ML injection 4,000 Units         4,000 Units IV Once 05/26/24 1659    05/26/24 1613  heparin 76678 units/250 mL (100 units/mL) in 0.45 % NaCl infusion  12.7 mL/hr         14.09 Units/kg/hr (Dosing Weight) IV Titrated --    05/26/24 1613  heparin (porcine) 5000 UNIT/ML injection 4,000 Units  Status:   Discontinued         4,000 Units IV As Needed 05/26/24 1644    05/26/24 1613  heparin (porcine) 5000 UNIT/ML injection 2,000 Units  Status:  Discontinued         2,000 Units IV As Needed 05/26/24 1644    05/26/24 1613  Pharmacy to Dose Heparin         -- XX Continuous PRN --    05/25/24 1505  Enoxaparin Sodium (LOVENOX) syringe 30 mg  Status:  Discontinued         30 mg SC Nightly 05/26/24 1613    05/25/24 1455  Pharmacy to Dose enoxaparin (LOVENOX)  Status:  Discontinued        Question:  Indication of use  Answer:  Prophylaxis    -- XX Continuous PRN 05/25/24 1505                    Disposition pending clinical course but possible home in the next 48 hours.    Tyson Ruiz DO  [facility]  05/29/24  19:33 EDT

## 2024-05-29 NOTE — NURSING NOTE
Cardiac Rehab referral received on patient. After reviewing patient information there is no qualifying diagnosis noted at this time .Qualifications for Cardiac Rehab include Coronary Stenting, AMI (NSTEMI Type 1, STEMI), Stable Angina, CABG, Heart Valve Repair/Replacement, Heart Transplant or Stable Chronic Heart Failure (with these specific qualifications, Left Ventricular Ejection Fraction of 35% or less, NYHA class II to IV symptoms despite optimal heart failure therapy for at least 6 weeks and has not had a recent (less than or equal to 6 weeks) or planned (less than or equal to 6 months) major cardiovascular hospitalizations or procedures. Due to patient being in the hospital, does not meet criteria at this time) Please contact us at [phone] with questions.    If the diagnosis is CHF when the patient meets the CHF criteria for Cardiac Rehab with a new order we will gladly schedule them.

## 2024-05-29 NOTE — PROGRESS NOTES
LOS: 4 days     Name: Aliyah Adkins  Age/Sex: 84 y.o. female  :  1940        PCP: Jabari Keller MD    Principal Problem:    Acute CHF  Active Problems:    Coronary artery disease involving native coronary artery of native heart      Admission Information: Aliyah Adkins is a 84 y.o. female with hypertension, pulmonary hypertension, and hyperlipidemia.     Chief Complaint: Shortness of breath    Interval history: Had been started on fixed dose Cardizem drip last night due to A-fib with RVR.  Converted to sinus rhythm at , but then returned to atrial fibrillation with RVR at approximately 0730 this morning.  Cardizem drip has been resumed    Subjective   Patient is awake in bed with her sister at the bedside at the time of my visit.  She reports that she continues to have mild shortness of breath.  We discussed the results of her stress test yesterday which revealed ischemia versus breast attenuation.  In light of her new onset atrial fibrillation, anginal equivalent symptoms, and equivocal stress test invasive coronary angiogram is indicated.      Vital Signs  Vital Signs (last 72 hrs)          0700   0659  0700   0659  0700   0659  0700   1021   Most Recent      Temp (°F) 98.1 -  99.2    97.8 -  99.8    97.5 -  99.4       98.9 (37.2) 638    Heart Rate 70 -  130    69 -  95    72 -  142       80  0638    Resp 18 -  20    18 -  19    18 -  20       20  0638    /51 -  142/79    117/53 -  147/57    118/57 -  157/60       132/46  0638    SpO2 (%) 90 -  99    90 -  98    90 -  97       94 638    Flow (L/min)   3.5    3 -  3.5      3       3  06    Oxygen Concentration (%)     32         32  0017          Temp:  [97.5 °F (36.4 °C)-99.4 °F (37.4 °C)] 98.9 °F (37.2 °C)  Heart Rate:  [] 80  Resp:  [18-20] 20  BP: (118-157)/(46-92) 132/46  Body mass index is 32.22 kg/m².      Intake/Output Summary (Last 24 hours) at  5/29/2024 1021  Last data filed at 5/29/2024 0333  Gross per 24 hour   Intake 360 ml   Output 1900 ml   Net -1540 ml       Vitals and nursing note reviewed.   Constitutional:       Appearance: Not in distress. Obese.      Interventions: Nasal cannula in place.      Comments: 3 L/min   Eyes:      Conjunctiva/sclera: Conjunctivae normal.   Pulmonary:      Effort: Pulmonary effort is normal.      Breath sounds: Normal breath sounds.   Cardiovascular:      Tachycardia present. Irregularly irregular rhythm.      Murmurs: There is no murmur.   Edema:     Peripheral edema absent.   Skin:     General: Skin is warm and dry.   Neurological:      Mental Status: Alert.         Telemetry: Sinus rhythm at 10:21 AM      A-fib with RVR at 0733 AM     Results Review:     Results from last 7 days   Lab Units 05/28/24  0326 05/27/24  0015 05/26/24  0103 05/25/24  0854   WBC 10*3/mm3 7.73 9.17 8.71 9.61   HEMOGLOBIN g/dL 10.3* 11.3* 10.6* 11.0*   PLATELETS 10*3/mm3 217 260 257 303     Results from last 7 days   Lab Units 05/29/24  0103 05/28/24  0327 05/27/24  0015 05/26/24  0103 05/25/24  0854   SODIUM mmol/L 143 144 144 144 145   POTASSIUM mmol/L 4.1 4.2 4.2 4.6 4.8   CHLORIDE mmol/L 100 102 103 106 108*   CO2 mmol/L 34.4* 30.9* 31.2* 25.9 24.5   BUN mg/dL 28* 26* 31* 30* 31*   CREATININE mg/dL 1.35* 1.43* 1.82* 1.81* 1.99*   CALCIUM mg/dL 9.5 9.1 9.4 9.1 9.0   GLUCOSE mg/dL 126* 132* 126* 111* 174*     Results from last 7 days   Lab Units 05/26/24  0335 05/26/24  0103 05/25/24  0854   HSTROP T ng/L 16* 18* 24*       Results from last 7 days   Lab Units 05/26/24  1649   INR  0.93       I reviewed the patient's new clinical results.  I reviewed the patient's new imaging results and agree with the interpretation.  I personally viewed and interpreted the patient's EKG/Telemetry data      Medication Review:   [Transfer Hold] acetaminophen, 650 mg, Oral, Once  [Transfer Hold] digoxin, 125 mcg, Oral, Daily  [Transfer Hold] digoxin, 250  mcg, Oral, Q6H  [Transfer Hold] empagliflozin, 10 mg, Oral, Daily  gabapentin, 100 mg, Oral, BID  metoprolol tartrate, 100 mg, Oral, BID  [Transfer Hold] oxazepam, 15 mg, Oral, BID  [Transfer Hold] senna-docusate sodium, 2 tablet, Oral, BID  [Transfer Hold] sodium chloride, 10 mL, Intravenous, Q12H      heparin, 14.09 Units/kg/hr (Dosing Weight), Last Rate: 14.09 Units/kg/hr (05/29/24 0600)  Pharmacy to Dose Heparin,   sodium chloride,         Assessment:  Acute HFpEF  ADENIKE  Paroxysmal atrial fibrillation  Troponin elevation  Essential hypertension  Pulmonary hypertension      Recommendations:  Tolerating beta-blocker and SGLT2 inhibitor will monitor creatinine and potentially begin MRA tomorrow.  Planning for invasive coronary angiogram today to rule out ischemic cause  Improving  Continuing to go in and out of atrial fibrillation with rapid ventricular response.  Begin dig load yesterday with plan for 125 mcg daily and dig level evaluation on day 3 of therapy  Plan for invasive coronary angiogram today  At goal on current medication  Further decision making based on cath results    I discussed the patients findings and my recommendations with patient and family.    Risks and benefits of the procedure discussed with the patient and her sister.  Risks specifically mentioned included bruising/hematoma, bleeding, infection, allergic reaction to medications, renal injury, dysrhythmia, blood clot, heart attack, and stroke.        Electronically signed by LATONYA Larkin, 05/29/24, 10:29 AM EDT.

## 2024-05-29 NOTE — PLAN OF CARE
Goal Outcome Evaluation:      Patient resting in bed. No complaints voiced. No visible indicators of acute distress noted att. Plan of care ongoing.      Daily Care Plan Summary: Heart Failure    Diuretic in use (IV or PO):  N/A        Daily weight (up or down):    loss: 2 lbs      Output > Intake (yes/no):Yes      O2 Requirements (current, any change?):  3 liters/min via nasal cannula      Symptoms noted with Activity (Respiratory Tolerance, functional state):     Activity intolerance      Anticipated Discharge Plans:     Home

## 2024-05-30 ENCOUNTER — READMISSION MANAGEMENT (OUTPATIENT)
Dept: CALL CENTER | Facility: HOSPITAL | Age: 84
End: 2024-05-30
Payer: MEDICARE

## 2024-05-30 VITALS
SYSTOLIC BLOOD PRESSURE: 130 MMHG | HEIGHT: 65 IN | RESPIRATION RATE: 18 BRPM | HEART RATE: 68 BPM | BODY MASS INDEX: 32.51 KG/M2 | WEIGHT: 195.11 LBS | TEMPERATURE: 98.4 F | OXYGEN SATURATION: 88 % | DIASTOLIC BLOOD PRESSURE: 47 MMHG

## 2024-05-30 PROBLEM — I50.32 CHRONIC HEART FAILURE WITH PRESERVED EJECTION FRACTION (HFPEF): Status: ACTIVE | Noted: 2024-05-30

## 2024-05-30 LAB
ANION GAP SERPL CALCULATED.3IONS-SCNC: 7 MMOL/L (ref 5–15)
BUN SERPL-MCNC: 26 MG/DL (ref 8–23)
BUN/CREAT SERPL: 22.8 (ref 7–25)
CALCIUM SPEC-SCNC: 9.4 MG/DL (ref 8.6–10.5)
CHLORIDE SERPL-SCNC: 102 MMOL/L (ref 98–107)
CHOLEST SERPL-MCNC: 186 MG/DL (ref 0–200)
CO2 SERPL-SCNC: 33 MMOL/L (ref 22–29)
CREAT SERPL-MCNC: 1.14 MG/DL (ref 0.57–1)
DEPRECATED RDW RBC AUTO: 89.2 FL (ref 37–54)
EGFRCR SERPLBLD CKD-EPI 2021: 48.2 ML/MIN/1.73
ERYTHROCYTE [DISTWIDTH] IN BLOOD BY AUTOMATED COUNT: 20.4 % (ref 12.3–15.4)
GLUCOSE SERPL-MCNC: 120 MG/DL (ref 65–99)
HBA1C MFR BLD: 6.2 % (ref 4.8–5.6)
HCT VFR BLD AUTO: 31.5 % (ref 34–46.6)
HDLC SERPL-MCNC: 40 MG/DL (ref 40–60)
HGB BLD-MCNC: 10.1 G/DL (ref 12–15.9)
LDLC SERPL CALC-MCNC: 114 MG/DL (ref 0–100)
LDLC/HDLC SERPL: 2.74 {RATIO}
MCH RBC QN AUTO: 37.1 PG (ref 26.6–33)
MCHC RBC AUTO-ENTMCNC: 32.1 G/DL (ref 31.5–35.7)
MCV RBC AUTO: 115.8 FL (ref 79–97)
PLATELET # BLD AUTO: 207 10*3/MM3 (ref 140–450)
PMV BLD AUTO: 9.9 FL (ref 6–12)
POTASSIUM SERPL-SCNC: 3.8 MMOL/L (ref 3.5–5.2)
QT INTERVAL: 344 MS
QTC INTERVAL: 389 MS
RBC # BLD AUTO: 2.72 10*6/MM3 (ref 3.77–5.28)
SODIUM SERPL-SCNC: 142 MMOL/L (ref 136–145)
TRIGL SERPL-MCNC: 183 MG/DL (ref 0–150)
VLDLC SERPL-MCNC: 32 MG/DL (ref 5–40)
WBC NRBC COR # BLD AUTO: 6.66 10*3/MM3 (ref 3.4–10.8)

## 2024-05-30 PROCEDURE — 85027 COMPLETE CBC AUTOMATED: CPT | Performed by: INTERNAL MEDICINE

## 2024-05-30 PROCEDURE — 99239 HOSP IP/OBS DSCHRG MGMT >30: CPT | Performed by: STUDENT IN AN ORGANIZED HEALTH CARE EDUCATION/TRAINING PROGRAM

## 2024-05-30 PROCEDURE — 93005 ELECTROCARDIOGRAM TRACING: CPT | Performed by: INTERNAL MEDICINE

## 2024-05-30 PROCEDURE — 80048 BASIC METABOLIC PNL TOTAL CA: CPT | Performed by: INTERNAL MEDICINE

## 2024-05-30 PROCEDURE — 83036 HEMOGLOBIN GLYCOSYLATED A1C: CPT | Performed by: INTERNAL MEDICINE

## 2024-05-30 PROCEDURE — 93010 ELECTROCARDIOGRAM REPORT: CPT | Performed by: SPECIALIST

## 2024-05-30 PROCEDURE — 99232 SBSQ HOSP IP/OBS MODERATE 35: CPT | Performed by: NURSE PRACTITIONER

## 2024-05-30 PROCEDURE — 80061 LIPID PANEL: CPT | Performed by: INTERNAL MEDICINE

## 2024-05-30 RX ORDER — METOPROLOL TARTRATE 100 MG/1
100 TABLET ORAL 2 TIMES DAILY
Qty: 60 TABLET | Refills: 0 | Status: SHIPPED | OUTPATIENT
Start: 2024-05-30 | End: 2024-06-29

## 2024-05-30 RX ORDER — DIGOXIN 125 MCG
125 TABLET ORAL
Qty: 30 TABLET | Refills: 0 | Status: SHIPPED | OUTPATIENT
Start: 2024-05-30 | End: 2024-06-29

## 2024-05-30 RX ORDER — FUROSEMIDE 40 MG/1
40 TABLET ORAL EVERY OTHER DAY
Qty: 15 TABLET | Refills: 0 | Status: SHIPPED | OUTPATIENT
Start: 2024-05-30 | End: 2024-06-29

## 2024-05-30 RX ADMIN — Medication 10 ML: at 08:16

## 2024-05-30 RX ADMIN — OXAZEPAM 15 MG: 15 CAPSULE ORAL at 08:22

## 2024-05-30 RX ADMIN — DIGOXIN 125 MCG: 0.12 TABLET ORAL at 11:37

## 2024-05-30 RX ADMIN — METOPROLOL TARTRATE 100 MG: 100 TABLET, FILM COATED ORAL at 08:16

## 2024-05-30 RX ADMIN — APIXABAN 5 MG: 5 TABLET, FILM COATED ORAL at 10:44

## 2024-05-30 RX ADMIN — EMPAGLIFLOZIN 10 MG: 10 TABLET, FILM COATED ORAL at 08:16

## 2024-05-30 RX ADMIN — GABAPENTIN 100 MG: 100 CAPSULE ORAL at 08:22

## 2024-05-30 NOTE — PLAN OF CARE
Goal Outcome Evaluation:      Daily Care Plan Summary: Heart Failure    Diuretic in use (IV or PO):   PO        Daily weight (up or down):    gain: 2lbs      Output > Intake (yes/no):Yes      O2 Requirements (current, any change?):  3 liters/min via nasal cannula      Symptoms noted with Activity (Respiratory Tolerance, functional state):     oxygen desaturation      Anticipated Discharge Plans:     home today

## 2024-05-30 NOTE — OUTREACH NOTE
Prep Survey      Flowsheet Row Responses   [facility] facility patient discharged from? [place]   Is LACE score < 7 ? No   Eligibility Readm Mgmt   Discharge diagnosis Acute CHF   Does the patient have one of the following disease processes/diagnoses(primary or secondary)? CHF   Prep survey completed? Yes            Shira GIVENS - Registered Nurse

## 2024-05-30 NOTE — CASE MANAGEMENT/SOCIAL WORK
Discharge Planning Assessment   [place]     Patient Name: Aliyah Adkins  MRN: [id number]  Today's Date: 5/30/2024    Admit Date: 5/25/2024       Discharge Plan       Row Name 05/30/24 1108       Plan    Final Discharge Disposition Code 01 - home or self-care    Final Note Pt to be discharged home on this date.                  Continued Care and Services - Admitted Since 5/25/2024    No active coordination exists for this encounter.       Expected Discharge Date and Time       Expected Discharge Date Expected Discharge Time    May 30, 2024             DOMINIC IsidroW

## 2024-05-30 NOTE — NURSING NOTE
Pt had home narcotic in the omnicell. I assisted ANDRE Cee with removing the Pt's home med from the omnicell. Pt started off with 49 count. We removed 49 and ANDRE Cee gave medication back to the patient.

## 2024-05-30 NOTE — PHARMACY PATIENT ASSISTANCE
Pharmacy was consulted to check on cost of Eliquis. Per patient's insurance, it is covered with $47 copay. Also confirmed eligibility for one-time-use 30 day trial card which is being called in to discharging [facility] pharmacy.    Thank you,  Gale Burrows, PharmD

## 2024-05-30 NOTE — PROGRESS NOTES
LOS: 5 days     Name: Aliyah Adkins  Age/Sex: 84 y.o. female  :  1940        PCP: Jabari Keller MD    Principal Problem:    Acute CHF  Active Problems:    Coronary artery disease involving native coronary artery of native heart    Chronic heart failure with preserved ejection fraction (HFpEF)      Admission Information: Aliyah Adkins is a 84 y.o. female with  hypertension, pulmonary hypertension, and hyperlipidemia     Chief Complaint: shortness of breath    Interval history: Telemetry reviewed.  Pt has maintained sinus rhythm for about 24 hours.      Pt seen and examined.  Denies chest pain, shortness of breath.  Is currently on her usual 3L O2 by nasal cannula.  Reports that she is asymptomatic when she goes into atrial fibrillation.      Subjective         Vital Signs  Vital Signs (last 72 hrs)          0700   0659  07 0659  07 0659  07 0905   Most Recent      Temp (°F) 97.8 -  99.8    97.5 -  99.4    98 -  99.4      98.4     98.4 (36.9)  0705    Heart Rate 69 -  95    72 -  142    70 -  93      68     68 05 0705    Resp 18 -  19    18 -  20      20      18     18  0705    /53 -  147/57    118/57 -  157/60    124/50 -  162/62      143/63     143/63  0705    SpO2 (%) 90 -  98    90 -  97    94 -  98      94     94  0705    Flow (L/min) 3 -  3.5      3      3       3  2152    Oxygen Concentration (%)   32           32  0017          Temp:  [98 °F (36.7 °C)-99.4 °F (37.4 °C)] 98.4 °F (36.9 °C)  Heart Rate:  [68-93] 68  Resp:  [18-20] 18  BP: (124-162)/(44-63) 143/63  Body mass index is 32.47 kg/m².      Intake/Output Summary (Last 24 hours) at 2024 1146  Last data filed at 2024 0500  Gross per 24 hour   Intake 258.19 ml   Output 1750 ml   Net -1491.81 ml       Vitals reviewed.   Constitutional:       Appearance: Well-developed.   Neck:      Vascular: No carotid bruit or JVD.   Pulmonary:      Effort:  Pulmonary effort is normal. No respiratory distress.      Breath sounds: Normal breath sounds. No wheezing. No rales.   Cardiovascular:      Normal rate. Regular rhythm.      Comments: Right radial access site covered with clean bandaid.  No erythema, no drainage.  Pulse intact.   No lower extremity edema  Skin:     General: Skin is warm and dry.   Neurological:      Mental Status: Alert and oriented to person, place, and time.         Telemetry:  Sinus 70-80s       Results Review:     Results from last 7 days   Lab Units 05/30/24  0106 05/28/24  0326 05/27/24  0015 05/26/24  0103 05/25/24  0854   WBC 10*3/mm3 6.66 7.73 9.17 8.71 9.61   HEMOGLOBIN g/dL 10.1* 10.3* 11.3* 10.6* 11.0*   PLATELETS 10*3/mm3 207 217 260 257 303     Results from last 7 days   Lab Units 05/30/24  0106 05/29/24  0103 05/28/24  0327 05/27/24  0015 05/26/24  0103 05/25/24  0854   SODIUM mmol/L 142 143 144 144 144 145   POTASSIUM mmol/L 3.8 4.1 4.2 4.2 4.6 4.8   CHLORIDE mmol/L 102 100 102 103 106 108*   CO2 mmol/L 33.0* 34.4* 30.9* 31.2* 25.9 24.5   BUN mg/dL 26* 28* 26* 31* 30* 31*   CREATININE mg/dL 1.14* 1.35* 1.43* 1.82* 1.81* 1.99*   CALCIUM mg/dL 9.4 9.5 9.1 9.4 9.1 9.0   GLUCOSE mg/dL 120* 126* 132* 126* 111* 174*     Results from last 7 days   Lab Units 05/26/24  0335 05/26/24  0103 05/25/24  0854   HSTROP T ng/L 16* 18* 24*       Results from last 7 days   Lab Units 05/26/24  1649   INR  0.93       I reviewed the patient's new clinical results.  I reviewed the patient's new imaging results and agree with the interpretation.  I personally viewed and interpreted the patient's EKG/Telemetry data      Medication Review:   acetaminophen, 650 mg, Oral, Once  apixaban, 5 mg, Oral, Q12H  digoxin, 125 mcg, Oral, Daily  empagliflozin, 10 mg, Oral, Daily  gabapentin, 100 mg, Oral, BID  metoprolol tartrate, 100 mg, Oral, BID  oxazepam, 15 mg, Oral, BID  senna-docusate sodium, 2 tablet, Oral, BID  sodium chloride, 10 mL, Intravenous, Q12H              Assessment:  Paroxysmal atrial fibrillation  Nonobstructive coronary artery disease per cardiac catheterization of 5/29/2024  Acute HFpEF  Hypertension  Pulmonary hypertension  ADENIKE      Recommendations:  Oral anticoagulation treatment has been discussed with the patient today.  She agreed to proceed with Eliquis.  Her [id number] is at least 5 for heart failure, hypertension, age, and vascular disease.  Continue digoxin and metoprolol for heart rate control.  Nonobstructive coronary artery disease is stable.  We will add rosuvastatin as an outpatient.  Acute HFpEF has resolved.  Patient is to continue Jardiance.  Will further escalate medications as outpatient as creatinine allows..  Hypertension is controlled.  Patient is stable for discharge.  Discussed with Dr. Vyas.  Patient will need a digoxin level drawn on Monday.  Will follow-up with her in 2 weeks.    I discussed the patients findings and my recommendations with patient and family    Electronically signed by LATONYA Mary, 05/30/24, 11:46 AM EDT.

## 2024-05-30 NOTE — NURSING NOTE
Pt being discharged home today on her baseline 3 lnc. Her sister is bringing her POT and will also transport her home. ANDRE Cee made aware the discharge is complete.

## 2024-05-30 NOTE — DISCHARGE SUMMARY
[facility] HOSPITALISTS DISCHARGE SUMMARY    Patient Identification:  Name:  Aliyah Adkins  Age:  84 y.o.  Sex:  female  :  1940  MRN:  [id number]  Visit Number:  [id number]    Date of Admission: 2024  Date of Discharge:  2024     PCP: Jabari Keller MD    DISCHARGE DIAGNOSIS  Acute on chronic HFpEF  Elevated troponin 2/2 above  Severe pulmonary hypertension  Acute hypercapnic on chronic hypoxemic respiratory failure  ADENIKE on CKD stage IIIa  Hypertension  Macrocytic anemia  Elevated liver enzymes    CONSULTS   Cardiology    PROCEDURES PERFORMED      HOSPITAL COURSE  Patient is a 84 y.o. female presented to [facility] complaining of shortness of breath.  Please see the admitting history and physical for further details.      Patient presented emergency department with above-noted complaint which she reports has been progressively worsening over the last 3 weeks.  Patient noticed increased swelling in the face and abdomen and states that she does not regular self but has gained at least 5 pounds.  Patient upon initial evaluation emergency department with clinical and laboratory evidence of acute on chronic heart failure with acute hypercapnic on chronic hypoxemic respiratory failure with ADENIKE on CKD stage IIIa with known history of severe pulmonary hypertension.  Patient was initiated on diuresis with some improvement however midway through the course of her hospitalization patient with development of new onset atrial fibrillation with rapid ventricular rate.  Patient's home metoprolol was increased and started on Cardizem with heparin and cardiology consulted.  Repeat echocardiogram obtained showed EF of 66 to 70% with moderate concentric hypertrophy and normal diastolic function with moderate mitral valve regurg and moderate tricuspid valve regurg with moderate pulmonary hypertension with RVSP of 45 to 55 mmHg.  After initial evaluation by cardiology and recommended for  stress testing this was obtained which revealed borderline positive test results and ultimately cardiology elected to take patient for left heart cath with noted coronary artery disease but no significant stenosis requiring stent placement and recommended for medical management alone.  Patient with continuous paroxysmal atrial fibrillation during hospitalization going in and out of sinus rhythm and metoprolol dosing adjusted as well as transitioning to being initiated on digoxin.  Ultimately patient maintaining good rate control with digoxin and metoprolol and maintaining O2 saturations on her baseline 3 L.  Patient with ADENIKE on CKD at admission that improved with diuretic therapy consistent with cardiorenal syndrome and at time of discharge patient was discontinued off her losartan as she maintain normotension as well as celecoxib given initiation of Eliquis for A-fib.  At time of evaluation patient was feeling well and at her baseline O2 requirements and had returned to her baseline state of health as such was felt to achieve maximal benefit of continued inpatient hospitalization and subsequently discharged home in stable medical condition.      VITAL SIGNS:  Temp:  [98 °F (36.7 °C)-99.4 °F (37.4 °C)] 98.4 °F (36.9 °C)  Heart Rate:  [68-93] 68  Resp:  [18-20] 18  BP: (124-162)/(44-63) 143/63  SpO2:  [88 %-98 %] 88 %  on  Flow (L/min):  [3] 3;   Device (Oxygen Therapy): nasal cannula    Body mass index is 32.47 kg/m².  Wt Readings from Last 3 Encounters:   05/30/24 88.5 kg (195 lb 1.7 oz)   07/01/22 87.1 kg (192 lb)   06/06/22 86.2 kg (190 lb)       PHYSICAL EXAM:  Constitutional:  Well-developed and well-nourished.  No acute distress.      HENT:  Head:  Normocephalic and atraumatic.  Mouth:  Moist mucous membranes.    Eyes:  Conjunctivae and EOM are normal. No scleral icterus.    Cardiovascular:  Normal rate, regular rhythm and normal heart sounds with no murmur.  Pulmonary/Chest:  No respiratory distress, no  wheezes, no crackles, with diminished breath sounds at the bases.  Abdominal:  Soft.  Bowel sounds are normal.  No distension and no tenderness.   Musculoskeletal:  No tenderness and no deformity.  No red or swollen joints anywhere.  Functional ROM intact.   Neurological:  Alert and oriented to person, place, and time.  No cranial nerve deficit.  No tongue deviation.  No facial droop.  No slurred speech. Intact Sensation throughout  Skin:  Skin is warm and dry. No rash or lesion noted. No pallor.   Peripheral vascular:  Pulses in all 4 extremities with no clubbing, no cyanosis, trace edema.  Psychiatric: Appropriate mood and affect, pleasant.     DISCHARGE DISPOSITION   Stable    DISCHARGE MEDICATIONS:     Discharge Medications        New Medications        Instructions Start Date   apixaban 5 MG tablet tablet  Commonly known as: ELIQUIS   5 mg, Oral, Every 12 Hours Scheduled      digoxin 125 MCG tablet  Commonly known as: LANOXIN   125 mcg, Oral, Daily Digoxin      empagliflozin 10 MG tablet tablet  Commonly known as: JARDIANCE   10 mg, Oral, Daily   Start Date: May 31, 2024     furosemide 40 MG tablet  Commonly known as: Lasix   40 mg, Oral, Every Other Day             Changes to Medications        Instructions Start Date   metoprolol tartrate 100 MG tablet  Commonly known as: LOPRESSOR  What changed:   medication strength  how much to take   100 mg, Oral, 2 Times Daily             Continue These Medications        Instructions Start Date   gabapentin 100 MG capsule  Commonly known as: NEURONTIN   100 mg, Oral, 2 Times Daily      oxazepam 15 MG capsule  Commonly known as: SERAX   15 mg, Oral, 2 Times Daily             Stop These Medications      celecoxib 100 MG capsule  Commonly known as: CeleBREX     losartan 100 MG tablet  Commonly known as: COZAAR                Additional Instructions for the Follow-ups that You Need to Schedule       Discharge Follow-up with PCP   As directed       Currently Documented PCP:     Jabari Keller MD    PCP Phone Number:    [phone]     Follow Up Details: 1 week post hospital follow up, patient requires repeat BMP and Digoxin level on Nat 6/3        Discharge Follow-up with Specialty: Cardiology; 2 Weeks   As directed      Specialty: Cardiology   Follow Up: 2 Weeks   Follow Up Details: 2-3 week follow up               Follow-up Information       [facility] .    Specialty: Cardiology  Contact information:  [address] [place] [address] [phone]             Jabari Keller MD .    Specialty: Internal Medicine  Why: 1 week post hospital follow up, patient requires repeat BMP and Digoxin level on Nat 6/3  Contact information:  [address] [facility] [address]  [phone]                              TEST  RESULTS PENDING AT DISCHARGE       CODE STATUS  Code Status and Medical Interventions:   Ordered at: 05/25/24 1337     Medical Intervention Limits:    NO intubation (DNI)     Code Status (Patient has no pulse and is not breathing):    No CPR (Do Not Attempt to Resuscitate)     Medical Interventions (Patient has pulse or is breathing):    Limited Support       Tyson Ruiz DO  [facility] Hospitalist  05/30/24  11:07 EDT    Please note that this discharge summary required more than 30 minutes to complete.

## 2024-06-03 ENCOUNTER — READMISSION MANAGEMENT (OUTPATIENT)
Dept: CALL CENTER | Facility: HOSPITAL | Age: 84
End: 2024-06-03
Payer: MEDICARE

## 2024-06-03 ENCOUNTER — OFFICE VISIT (OUTPATIENT)
Dept: PULMONOLOGY | Facility: CLINIC | Age: 84
End: 2024-06-03
Payer: MEDICARE

## 2024-06-03 VITALS
TEMPERATURE: 96.8 F | HEART RATE: 74 BPM | BODY MASS INDEX: 32.49 KG/M2 | WEIGHT: 195 LBS | SYSTOLIC BLOOD PRESSURE: 124 MMHG | DIASTOLIC BLOOD PRESSURE: 68 MMHG | HEIGHT: 65 IN | OXYGEN SATURATION: 87 %

## 2024-06-03 DIAGNOSIS — E66.9 OBESITY (BMI 30-39.9): ICD-10-CM

## 2024-06-03 DIAGNOSIS — J96.11 CHRONIC RESPIRATORY FAILURE WITH HYPOXIA AND HYPERCAPNIA: ICD-10-CM

## 2024-06-03 DIAGNOSIS — I50.9 CHF (CONGESTIVE HEART FAILURE): ICD-10-CM

## 2024-06-03 DIAGNOSIS — J96.12 CHRONIC RESPIRATORY FAILURE WITH HYPOXIA AND HYPERCAPNIA: ICD-10-CM

## 2024-06-03 DIAGNOSIS — J98.4 RESTRICTIVE LUNG DISEASE: Primary | ICD-10-CM

## 2024-06-03 DIAGNOSIS — I27.20 PULMONARY HYPERTENSION: ICD-10-CM

## 2024-06-03 PROCEDURE — G2211 COMPLEX E/M VISIT ADD ON: HCPCS | Performed by: NURSE PRACTITIONER

## 2024-06-03 PROCEDURE — 99214 OFFICE O/P EST MOD 30 MIN: CPT | Performed by: NURSE PRACTITIONER

## 2024-06-03 PROCEDURE — 1159F MED LIST DOCD IN RCRD: CPT | Performed by: NURSE PRACTITIONER

## 2024-06-03 PROCEDURE — 1160F RVW MEDS BY RX/DR IN RCRD: CPT | Performed by: NURSE PRACTITIONER

## 2024-06-03 NOTE — OUTREACH NOTE
CHF Week 1 Survey      Flowsheet Row Responses   [facility] patient discharged from? [place]   Does the patient have one of the following disease processes/diagnoses(primary or secondary)? CHF   CHF Week 1 attempt successful? Yes   Call start time 1622   Call end time 1632   Discharge diagnosis Acute CHF   Meds reviewed with patient/caregiver? Yes   Is the patient having any side effects they believe may be caused by any medication additions or changes? No   Does the patient have all medications ordered at discharge? Yes   Is the patient taking all medications as directed (includes completed medication regime)? Yes   Has the patient kept scheduled appointments due by today? Yes   DME comments Pt wears home O2 @3L continuously. Pt will begin having CPAP therapy.   Pulse Ox monitoring Intermittent   Pulse Ox device source Patient   O2 Sat comments pt reports her O2 sats drop when she is up performing ADL's to 70%   O2 Sat: education provided Sat levels, Monitoring frequency, When to seek care   Comments Pt denies chest pain or pressure. Pt does not monitor her BP at home, nurse educated pt to monitor BP for next few days since new BP med changes, pt v/u. SOA and drop in saturations with exertion that resolves with rest. Pt limits her NA in diet and monitoring her daily wts, pt reports. Pt denies edema at this time.   Did the patient receive a copy of their discharge instructions? Yes   Nursing interventions Reviewed instructions with patient   What is the patient's perception of their health status since discharge? Improving   Nursing interventions Nurse provided patient education   Is the patient able to teach back signs and symptoms of worsening condition? (i.e. weight gain, shortness of air, etc.) Yes   If the patient is a current smoker, are they able to teach back resources for cessation? Not a smoker   Is the patient/caregiver able to teach back the hierarchy of who to call/visit for symptoms/problems? PCP,  Specialist, Home health nurse, Urgent Care, ED, 911 Yes   CHF Nursing Interventions Education provided on various zones   CHF Zone this Call Green Zone   Green Zone Patient reports doing well, No new or worsening shortness of breath, Weight check stable, No chest pain   Green Zone Interventions Daily weight check, Meds as directed, Low sodium diet, Follow up visits planned    CHF Week 1 call completed? Yes   Revoked No further contact(revokes)-requires comment   Call end time [id number]            Vidya GARCIA - Registered Nurse

## 2024-06-03 NOTE — PROGRESS NOTES
"Chief Complaint  Hypoxia    Subjective        Aliyah Adkins presents to [facility] PULMONARY & CRITICAL CARE MEDICINE  History of Present Illness    Ms. Adkins is an 84 year old female with a medical history significant for arthritis, atrial fibrillation, hypertension, pulmonary hypertension, hyperlipidemia, and CAD.      She presents today for evaluation of hypoxia.  She reports that she has had CHF for years and short of breath for year but that over the last 3 weeks her shortness fo breath has been worse.  She was admitted to the hospital.  She states that she is currently using supplemental oxygen at 3 L.  She states that prior to being in the hospital she was only using her oxygen at night.  She is now using it all the time. She is noted to be 87% on 3L.  She is not currently using any inhalers.        Objective   Vital Signs:  /68   Pulse 74   Temp 96.8 °F (36 °C)   Ht 165.1 cm (65\")   Wt 88.5 kg (195 lb)   SpO2 (!) 87%   BMI 32.45 kg/m²   Estimated body mass index is 32.45 kg/m² as calculated from the following:    Height as of this encounter: 165.1 cm (65\").    Weight as of this encounter: 88.5 kg (195 lb).       BMI is >= 30 and <35. (Class 1 Obesity). The following options were offered after discussion;: exercise counseling/recommendations and nutrition counseling/recommendations      Physical Exam     GENERAL APPEARANCE: Well developed, well nourished, alert and cooperative, and appears to be in no acute distress.    HEAD: normocephalic. Atraumatic.    EYES: PERRL, EOMI. Vision is grossly intact.    THROAT: Oral cavity and pharynx normal. No inflammation, swelling, exudate, or lesions.     NECK: Neck supple.  No thyromegaly.    CARDIAC: Normal S1 and S2. No S3, S4 or murmurs. Rhythm is regular.     RESPIRATORY:Bilateral air entry positive. Bilateral diminished breath sounds. No wheezing, crackles or rhonchi noted.    GI: Positive bowel sounds. Soft, nondistended, nontender. "     MUSCULOSKELETAL: No significant deformity or joint abnormality. No edema. Peripheral pulses intact. No varicosities.    NEUROLOGICAL: Strength and sensation symmetric and intact throughout.     PSYCHIATRIC: The mental examination revealed the patient was oriented to person, place, and time. '  Result Review :    The following data was reviewed by: LATONYA Wagner on 06/03/2024:  Common labs          5/28/2024    03:26 5/28/2024    03:27 5/29/2024    01:03 5/30/2024    01:06   Common Labs   Glucose  132  126  120    BUN  26 28  26    Creatinine  1.43  1.35  1.14    Sodium  144  143  142    Potassium  4.2  4.1  3.8    Chloride  102  100  102    Calcium  9.1  9.5  9.4    WBC 7.73    6.66    Hemoglobin 10.3    10.1    Hematocrit 33.0    31.5    Platelets 217    207    Total Cholesterol    186    Triglycerides    183    HDL Cholesterol    40    LDL Cholesterol     114    Hemoglobin A1C    6.20                   Assessment and Plan     Diagnoses and all orders for this visit:    1. Restrictive lung disease (Primary)  -     CT chest hi resolution; Future    2. Chronic respiratory failure with hypoxia and hypercapnia  -     DME NIPPV - IPPV/BiPAP/CPAP    3. CHF (congestive heart failure)  -     DME NIPPV - IPPV/BiPAP/CPAP    4. Pulmonary hypertension    5. Obesity (BMI 30-39.9)        CT Chest was reviewed:  Extensive interstitial changes, groundglass opacities and infiltrates.   Findings could be inflammatory/infectious in origin or related to CHF.   Background of interstitial chronic changes    Spirometry was completed in the office and shows a moderate restriction.  I recommend a hi resolution CT chest.    She is compliant with use of supplemental oxygen at 3 L.    ABG from hospitalization was reviewed.  Elevated pCO2 was noted.      Home Bipap and cpap considered and ruled out, due to the need for adjustable pressure support provided by Non-invasive ventilator (NIV). Given her continued shortness of  breath with exertion due to her underlying lung disease and CHF, she is at risk for worsening chronic respiratory failure.  Chronic respiratory failure require NIV with AVAP/AE mode with target tidal volume and auto back up rate.  This will assist to decrease pCO2, prevent life threatening air trapping and breath stacking that a Bipap or cpap cannot provide.  She also requires a device that will not fail in the event of a power outage and one that is portable for mobility in the home when needed.  Due to her worsening condition and frequent hospitalizations, I am prescribing NIV therapy to decrease the need for unplanned expensive medical  encounters including ED visits and hospital readmissions.           Follow Up     Return in about 3 months (around 9/3/2024).  Patient was given instructions and counseling regarding her condition or for health maintenance advice. Please see specific information pulled into the AVS if appropriate.

## 2024-06-07 ENCOUNTER — PATIENT ROUNDING (BHMG ONLY) (OUTPATIENT)
Dept: PULMONOLOGY | Facility: CLINIC | Age: 84
End: 2024-06-07
Payer: MEDICARE

## 2024-06-07 NOTE — PROGRESS NOTES
June 7, 2024    Hello, may I speak with Aliyah Adkins?    My name is Eevlyn      I am  with [facility] PULMONARY & CRITICAL CARE MEDICINE  [address]  [facility] [address] [phone].    Before we get started may I verify your date of birth? 1940    I am calling to officially welcome you to our practice and ask about your recent visit. Is this a good time to talk? yes    Tell me about your visit with us. What things went well?  It was all ok       We're always looking for ways to make our patients' experiences even better. Do you have recommendations on ways we may improve?  no    Overall were you satisfied with your first visit to our practice? yes       I appreciate you taking the time to speak with me today. Is there anything else I can do for you? no      Thank you, and have a great day.

## 2024-06-11 LAB
FEV1/FVC: 89 %
FEV1: 1.16 LITERS
FVC VOL RESPIRATORY: 1.3 LITERS
PEF: 3.71 L/S

## 2024-06-11 ASSESSMENT — PULMONARY FUNCTION TESTS
FEV1: 1.16
FVC: 1.30
FEV1/FVC: 89

## 2024-06-12 ENCOUNTER — HOSPITAL ENCOUNTER (OUTPATIENT)
Dept: CARDIOLOGY | Facility: HOSPITAL | Age: 84
Discharge: HOME | End: 2024-06-12
Payer: MEDICARE

## 2024-06-12 VITALS
WEIGHT: 191.2 LBS | DIASTOLIC BLOOD PRESSURE: 52 MMHG | SYSTOLIC BLOOD PRESSURE: 124 MMHG | HEIGHT: 65 IN | HEART RATE: 65 BPM | BODY MASS INDEX: 31.86 KG/M2 | OXYGEN SATURATION: 94 %

## 2024-06-12 DIAGNOSIS — I34.0 MODERATE MITRAL REGURGITATION: ICD-10-CM

## 2024-06-12 DIAGNOSIS — J84.9 ILD (INTERSTITIAL LUNG DISEASE): ICD-10-CM

## 2024-06-12 DIAGNOSIS — J96.11 CHRONIC RESPIRATORY FAILURE WITH HYPOXIA AND HYPERCAPNIA: ICD-10-CM

## 2024-06-12 DIAGNOSIS — I48.91 ATRIAL FIBRILLATION WITH RVR: ICD-10-CM

## 2024-06-12 DIAGNOSIS — I48.0 PAROXYSMAL ATRIAL FIBRILLATION: ICD-10-CM

## 2024-06-12 DIAGNOSIS — I07.1 MODERATE TRICUSPID REGURGITATION: ICD-10-CM

## 2024-06-12 DIAGNOSIS — J96.12 CHRONIC RESPIRATORY FAILURE WITH HYPOXIA AND HYPERCAPNIA: ICD-10-CM

## 2024-06-12 DIAGNOSIS — I50.32 CHRONIC HEART FAILURE WITH PRESERVED EJECTION FRACTION (HFPEF): Primary | ICD-10-CM

## 2024-06-12 LAB
ABSOLUTE LUNG FLUID CONTENT: 36 % (ref 20–35)
ANION GAP SERPL CALCULATED.3IONS-SCNC: 14.1 MMOL/L (ref 5–15)
BUN SERPL-MCNC: 16 MG/DL (ref 8–23)
BUN/CREAT SERPL: 12.9 (ref 7–25)
CALCIUM SPEC-SCNC: 9.2 MG/DL (ref 8.6–10.5)
CHLORIDE SERPL-SCNC: 100 MMOL/L (ref 98–107)
CO2 SERPL-SCNC: 27.9 MMOL/L (ref 22–29)
CREAT SERPL-MCNC: 1.24 MG/DL (ref 0.57–1)
DIGOXIN SERPL-MCNC: 1.3 NG/ML (ref 0.6–1.2)
EGFRCR SERPLBLD CKD-EPI 2021: 43.5 ML/MIN/1.73
GLUCOSE SERPL-MCNC: 177 MG/DL (ref 65–99)
MAGNESIUM SERPL-MCNC: 1.9 MG/DL (ref 1.6–2.4)
NT-PROBNP SERPL-MCNC: 445.6 PG/ML (ref 0–1800)
POTASSIUM SERPL-SCNC: 3.4 MMOL/L (ref 3.5–5.2)
SODIUM SERPL-SCNC: 142 MMOL/L (ref 136–145)

## 2024-06-12 PROCEDURE — 94726 PLETHYSMOGRAPHY LUNG VOLUMES: CPT | Performed by: PHYSICIAN ASSISTANT

## 2024-06-12 PROCEDURE — 80162 ASSAY OF DIGOXIN TOTAL: CPT | Performed by: PHYSICIAN ASSISTANT

## 2024-06-12 PROCEDURE — 83880 ASSAY OF NATRIURETIC PEPTIDE: CPT | Performed by: PHYSICIAN ASSISTANT

## 2024-06-12 PROCEDURE — 99214 OFFICE O/P EST MOD 30 MIN: CPT | Performed by: PHYSICIAN ASSISTANT

## 2024-06-12 PROCEDURE — 36415 COLL VENOUS BLD VENIPUNCTURE: CPT | Performed by: PHYSICIAN ASSISTANT

## 2024-06-12 PROCEDURE — 83735 ASSAY OF MAGNESIUM: CPT | Performed by: PHYSICIAN ASSISTANT

## 2024-06-12 PROCEDURE — 80048 BASIC METABOLIC PNL TOTAL CA: CPT | Performed by: PHYSICIAN ASSISTANT

## 2024-06-12 RX ORDER — DIGOXIN 125 MCG
125 TABLET ORAL
Qty: 30 TABLET | Refills: 4 | Status: SHIPPED | OUTPATIENT
Start: 2024-06-12

## 2024-06-12 RX ORDER — POTASSIUM CHLORIDE 750 MG/1
10 TABLET, FILM COATED, EXTENDED RELEASE ORAL EVERY OTHER DAY
Qty: 15 TABLET | Refills: 4 | Status: SHIPPED | OUTPATIENT
Start: 2024-06-12

## 2024-06-12 RX ORDER — FUROSEMIDE 40 MG/1
40 TABLET ORAL EVERY OTHER DAY
Qty: 15 TABLET | Refills: 4 | Status: SHIPPED | OUTPATIENT
Start: 2024-06-12

## 2024-06-12 RX ORDER — METOPROLOL TARTRATE 100 MG/1
100 TABLET ORAL 2 TIMES DAILY
Qty: 60 TABLET | Refills: 4 | Status: SHIPPED | OUTPATIENT
Start: 2024-06-12

## 2024-06-12 NOTE — PROGRESS NOTES
[facility]  Pharmacist Note     Aliyah Adkins is a 84 y.o. female seen in the [facility] Failure [facility] for HFpEF with most recent ECHO on 5/25/24 showing EF 66-70%.  Aliyah Adkins reports a good understanding of medications.   Patient was most recently hospitalized from 5/25-5/30 for SOB. During this visit she was found to have new onset Afib. Patient was started on digoxin, Eliquis, Jardiance, and Lasix. She was stopped on Losartan (reports that patient was normotensive without this) and Celebrex. Patient did present to cath lab where she was not stented but recommended for medical management. Patient was discharged on Lasix 40 mg every other day and has been using this since. Patient reports good urine output and reports weight has remained stable at home, reportedly 190 lbs. Patient reports she does not regularly check her BP and can feel when its high. She reports she has not checked her BP since being discharged from the hospital. She denies any signs of yeast infection/UTI or bleeding. She reports that both Jardiance and Eliquis are a $47 co-pay and both of these together are not affordable but one co-pay would be.     Medication Use:   Hx of med intolerances:  None related to HF  Retail Rx Management: Romaine Wall pt prefers to remain here as it is in close proximity to her home                                           Duke Dhillon ( HF [facility])   Patient reports Eliquis $47 co-pay and Jardiance $47 co-pay (HF [facility])    Past Medical History:   Diagnosis Date    Arthritis     Hyperlipidemia     Hypertension      ALLERGIES: Patient has no known allergies.  Current Outpatient Medications   Medication Sig Dispense Refill    apixaban (ELIQUIS) 5 MG tablet tablet Take 1 tablet by mouth Every 12 (Twelve) Hours for 30 days. Indications: Atrial Fibrillation 60 tablet 0    digoxin (LANOXIN) 125 MCG tablet Take 1 tablet by mouth Daily for 30 days. 30 tablet 0    empagliflozin (JARDIANCE) 10 MG tablet  "tablet Take 1 tablet by mouth Daily for 30 days. 30 tablet 0    furosemide (Lasix) 40 MG tablet Take 1 tablet by mouth Every Other Day for 30 days. 15 tablet 0    gabapentin (NEURONTIN) 100 MG capsule Take 1 capsule by mouth 2 (Two) Times a Day.      metoprolol tartrate (LOPRESSOR) 100 MG tablet Take 1 tablet by mouth 2 (Two) Times a Day for 30 days. 60 tablet 0    oxazepam (SERAX) 15 MG capsule Take 1 capsule by mouth 2 (Two) Times a Day.       No current facility-administered medications for this encounter.       Vaccination History:   Pneumonia: PCV 2014; PPSV-23 2017: Patient is eligible for PCV 20 and will discuss with PCP (pt unsure if she has had and will speak with Dr. Keller)  Annual Influenza: Declines   Shingles: Completed Shingrix 2 dose series     Objective  Vitals:    06/12/24 0826   BP: 124/52   BP Location: Left arm   Patient Position: Sitting   Cuff Size: Adult   Pulse: 65   SpO2: 94%   Weight: 86.7 kg (191 lb 3.2 oz)   Height: 165.1 cm (65\")     Wt Readings from Last 3 Encounters:   06/12/24 86.7 kg (191 lb 3.2 oz)   06/03/24 88.5 kg (195 lb)   05/30/24 88.5 kg (195 lb 1.7 oz)         06/12/24  0826   Weight: 86.7 kg (191 lb 3.2 oz)     Lab Results   Component Value Date    GLUCOSE 120 (H) 05/30/2024    BUN 26 (H) 05/30/2024    CREATININE 1.14 (H) 05/30/2024    BCR 22.8 05/30/2024    K 3.8 05/30/2024    CO2 33.0 (H) 05/30/2024    CALCIUM 9.4 05/30/2024    ALBUMIN 4.1 05/26/2024    AST 24 05/26/2024    ALT 27 05/26/2024     Lab Results   Component Value Date    WBC 6.66 05/30/2024    HGB 10.1 (L) 05/30/2024    HCT 31.5 (L) 05/30/2024    .8 (H) 05/30/2024     05/30/2024     Lab Results   Component Value Date    TROPONINT 16 (H) 05/26/2024     Lab Results   Component Value Date    PROBNP 2,327.0 (H) 05/25/2024     Results for orders placed during the hospital encounter of 05/25/24    Adult Transthoracic Echo Complete W/ Cont if Necessary Per Protocol    Interpretation Summary    Left " ventricular systolic function is hyperdynamic (EF > 70%). Calculated left ventricular EF = 71% Left ventricular ejection fraction appears to be 66 - 70%.    Left ventricular wall thickness is consistent with moderate concentric hypertrophy.    Left ventricular diastolic function was normal.    Moderate mitral valve regurgitation is present.    Moderate tricuspid valve regurgitation is present.    Estimated right ventricular systolic pressure from tricuspid regurgitation is moderately elevated (45-55 mmHg).    Moderate pulmonary hypertension is present.         GDMT    Drug Class   Drug   Dose Last Dose Adjustment Additional Titration   Notes   ACEi/ARB/ARNI        Beta Blocker Lopressor  100 mg BID 5/2024     MRA        SGLT2i Jardiance  10 mg QD  N/A        Drug Therapy Problems    1. Drug Interactions Screening        Digoxin + Lasix         Digoxin + Lopressor        Gabapentin +Oxazepam   2. Drug-Disease Interactions:   3. GDMT  4. Digoxin Level Check   5.  Not checking BP/fatigue  6. Hypokalemia    Recommendations:     Digoxin + Lasix:Loop Diuretics may enhance the adverse/toxic effect of Cardiac Glycosides. Pt denied any s/sx.         Digoxin + Lopressor: Bradycardia-Causing Agents may enhance the bradycardic effect of other Bradycardia-Causing Agents:         Gabapentin +Oxazepam: CNS Depressants may enhance the adverse/toxic effect of other CNS Depressants. Pt denies any s/sx of dizziness,etc. Patient aware of increased risk if she were to fall especially with being on Eliquis.   2. No significant drug-disease interactions noted   3. No recommendations at this time. Patient has been approved for HF huan to cover Jardiance. Patient in agreement for Jardiance to be filled and mailed by [facility] pharmacy.   4.Would recommend checking digoxin level with new start and CKD. Dig level slightly elevated. Would recommend close follow up to repeat both K+ and Dig level to monitor for toxicity. Dig toxicity  can occur even at therapeutic levels with conditions such as hypokalemia.   5. Encouraged patient to start checking at least once daily and keeping log in book. Fatigue could be from Lopressor increase dose from hospital discharge, however, unsure how BP/HR have been running at home for recommendation at this time.   6. Would recommend K+ supplementation on days patient takes Lasix with slightly low K+ resulting today.  Continue close monitoring of labs.     Discharge medications have been reviewed and reconciled.    Patient was educated on heart failure medications and the importance of medication adherence. All questions were addressed and patient expressed  understanding. Used teach-back method to assess understanding.     Thank you for allowing me to participate in the care of your patient,    Evelyn Aldridge. Ni, PharmD  06/12/24  08:34 EDT
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
## 2024-06-12 NOTE — PROGRESS NOTES
[facility]    Date: 06/12/24     Vitals:    06/12/24 0826   BP: 124/52   Pulse: 65   SpO2: 94%      Weight 191.2  Method of arrival: Ambulatory    Weighing self daily: Yes    Monitoring Heart Failure Zones: No    Today's HF Zone: Reviewed Zones    Taking medications as prescribed: Yes    Edema No    Shortness of Air: Yes with activity    Number of pillows used at night:<2    Educational Materials given:  AVS, Heart Success patient booklet                                                                         ReDS Value: 36  36-41 Possible Hypervolemic Status      Yasir Guillaume RN 06/12/24 08:29 EDT

## 2024-06-19 ENCOUNTER — HOSPITAL ENCOUNTER (OUTPATIENT)
Facility: HOSPITAL | Age: 84
Discharge: HOME | End: 2024-06-19
Payer: MEDICARE

## 2024-06-19 ENCOUNTER — HOSPITAL ENCOUNTER (OUTPATIENT)
Dept: CARDIOLOGY | Facility: HOSPITAL | Age: 84
Discharge: HOME | End: 2024-06-19
Payer: MEDICARE

## 2024-06-19 VITALS
HEIGHT: 65 IN | OXYGEN SATURATION: 94 % | SYSTOLIC BLOOD PRESSURE: 116 MMHG | WEIGHT: 190.4 LBS | DIASTOLIC BLOOD PRESSURE: 58 MMHG | BODY MASS INDEX: 31.72 KG/M2 | HEART RATE: 83 BPM

## 2024-06-19 DIAGNOSIS — I48.0 PAROXYSMAL ATRIAL FIBRILLATION: ICD-10-CM

## 2024-06-19 DIAGNOSIS — J98.4 RESTRICTIVE LUNG DISEASE: ICD-10-CM

## 2024-06-19 DIAGNOSIS — E87.6 HYPOKALEMIA: ICD-10-CM

## 2024-06-19 DIAGNOSIS — I27.20 PULMONARY HYPERTENSION: ICD-10-CM

## 2024-06-19 DIAGNOSIS — I50.32 CHRONIC HEART FAILURE WITH PRESERVED EJECTION FRACTION (HFPEF): Primary | ICD-10-CM

## 2024-06-19 DIAGNOSIS — I34.0 MODERATE MITRAL REGURGITATION: ICD-10-CM

## 2024-06-19 DIAGNOSIS — I07.1 MODERATE TRICUSPID REGURGITATION: ICD-10-CM

## 2024-06-19 LAB — ABSOLUTE LUNG FLUID CONTENT: 51 % (ref 20–35)

## 2024-06-19 PROCEDURE — 71250 CT THORAX DX C-: CPT

## 2024-06-19 PROCEDURE — 80048 BASIC METABOLIC PNL TOTAL CA: CPT | Performed by: PHYSICIAN ASSISTANT

## 2024-06-19 PROCEDURE — 99214 OFFICE O/P EST MOD 30 MIN: CPT | Performed by: PHYSICIAN ASSISTANT

## 2024-06-19 PROCEDURE — 94726 PLETHYSMOGRAPHY LUNG VOLUMES: CPT | Performed by: PHYSICIAN ASSISTANT

## 2024-06-19 NOTE — PROGRESS NOTES
Heart Failure Clinic    Date: 06/19/24     Vitals:    06/19/24 1343   BP: 116/58   Pulse: 83   SpO2: (!) 83%    O2 @ 3lpm sat increased to 94%  WEIGHT 190.4  Method of arrival: Ambulatory    Weighing self daily: Yes    Monitoring Heart Failure Zones: Yes    Today's HF Zone: Green    Taking medications as prescribed: Yes    Edema No    Shortness of Air: No    Number of pillows used at night:<2    Educational Materials given:  AVS                                                                         ReDS Value:   Over 50 Extreme Overload      Yasir Guillaume RN 06/19/24 13:47 EDT

## 2024-06-19 NOTE — PROGRESS NOTES
Heart Failure Clinic  Pharmacist Note     Aliyah Adkins is a 84 y.o. female seen in the Heart Failure Clinic for HFpEF.  Aliyah Adkins reports a good understanding of medications.  She denies any swelling or SOB and says her weight is stable.  She is taking Lasix 40mg QOD.  She is checking her BP at home and it has been 136/50 and 124/43.      Medication Use:   Hx of med intolerances:  None related to HF  Retail Rx Management: Romaine Avila- pt prefers to remain here as it is in close proximity to her home                                          [facility] ( HF [facility]OK[facility])   Patient reports Eliquis $47 co-pay FOCUS and Jardiance $47 co-pay (HF LOKI)    Past Medical History:   Diagnosis Date    Arthritis     Hyperlipidemia     Hypertension      ALLERGIES: Patient has no known allergies.  Current Outpatient Medications   Medication Sig Dispense Refill    digoxin (LANOXIN) 125 MCG tablet Take 1 tablet by mouth Daily. 30 tablet 4    empagliflozin (JARDIANCE) 10 MG tablet tablet Take 1 tablet by mouth Daily. 30 tablet 4    furosemide (Lasix) 40 MG tablet Take 1 tablet by mouth Every Other Day. 15 tablet 4    gabapentin (NEURONTIN) 100 MG capsule Take 1 capsule by mouth 2 (Two) Times a Day.      metoprolol tartrate (LOPRESSOR) 100 MG tablet Take 1 tablet by mouth 2 (Two) Times a Day. 60 tablet 4    oxazepam (SERAX) 15 MG capsule Take 1 capsule by mouth 2 (Two) Times a Day.      potassium chloride 10 MEQ CR tablet Take 1 tablet by mouth Every Other Day. NOTE: Take only when Lasix is taken 15 tablet 4    apixaban (ELIQUIS) 5 MG tablet tablet Take 1 tablet by mouth Every 12 (Twelve) Hours. Indications: Atrial Fibrillation 28 tablet 5     No current facility-administered medications for this encounter.       Vaccination History:   Pneumonia: PCV 2014; PPSV-23 2017: Patient is eligible for PCV 20 and will discuss with PCP (pt unsure if she has had and will speak with Dr. Keller)  Annual Influenza: Declines  "  Shingles: Completed Shingrix 2 dose series     Objective  Vitals:    06/19/24 1343   BP: 116/58   BP Location: Left arm   Patient Position: Sitting   Cuff Size: Adult   Pulse: 83   SpO2: 94%   Weight: 86.4 kg (190 lb 6.4 oz)   Height: 165.1 cm (65\")     Wt Readings from Last 3 Encounters:   06/19/24 86.4 kg (190 lb 6.4 oz)   06/12/24 86.7 kg (191 lb 3.2 oz)   06/03/24 88.5 kg (195 lb)         06/19/24  1343   Weight: 86.4 kg (190 lb 6.4 oz)     Lab Results   Component Value Date    GLUCOSE 177 (H) 06/12/2024    BUN 16 06/12/2024    CREATININE 1.24 (H) 06/12/2024    BCR 12.9 06/12/2024    K 3.4 (L) 06/12/2024    CO2 27.9 06/12/2024    CALCIUM 9.2 06/12/2024    ALBUMIN 4.1 05/26/2024    AST 24 05/26/2024    ALT 27 05/26/2024     Lab Results   Component Value Date    WBC 6.66 05/30/2024    HGB 10.1 (L) 05/30/2024    HCT 31.5 (L) 05/30/2024    .8 (H) 05/30/2024     05/30/2024     Lab Results   Component Value Date    TROPONINT 16 (H) 05/26/2024     Lab Results   Component Value Date    PROBNP 445.6 06/12/2024     Results for orders placed during the hospital encounter of 05/25/24    Adult Transthoracic Echo Complete W/ Cont if Necessary Per Protocol    Interpretation Summary    Left ventricular systolic function is hyperdynamic (EF > 70%). Calculated left ventricular EF = 71% Left ventricular ejection fraction appears to be 66 - 70%.    Left ventricular wall thickness is consistent with moderate concentric hypertrophy.    Left ventricular diastolic function was normal.    Moderate mitral valve regurgitation is present.    Moderate tricuspid valve regurgitation is present.    Estimated right ventricular systolic pressure from tricuspid regurgitation is moderately elevated (45-55 mmHg).    Moderate pulmonary hypertension is present.         GDMT    Drug Class   Drug   Dose Last Dose Adjustment Additional Titration   Notes   ACEi/ARB/ARNI        Beta Blocker Lopressor  100 mg BID 5/2024     MRA      "   SGLT2i Jardiance  10 mg QD  N/A        Drug Therapy Problems    1. Medication Assistance Needed    Recommendations:     We will re-attempt for HF huan once diagnosis is added in for cardiomyopathy.  She will have FOCUS coverage for Eliquis.         Patient was educated on heart failure medications and the importance of medication adherence. All questions were addressed and patient expressed  understanding.     Thank you for allowing me to participate in the care of your patient,    Anum Ocasio, PharmD  06/19/24  15:58 EDT

## 2024-06-20 LAB
ANION GAP SERPL CALCULATED.3IONS-SCNC: 11.4 MMOL/L (ref 5–15)
BUN SERPL-MCNC: 16 MG/DL (ref 8–23)
BUN/CREAT SERPL: 13.6 (ref 7–25)
CALCIUM SPEC-SCNC: 9.3 MG/DL (ref 8.6–10.5)
CHLORIDE SERPL-SCNC: 100 MMOL/L (ref 98–107)
CO2 SERPL-SCNC: 27.6 MMOL/L (ref 22–29)
CREAT SERPL-MCNC: 1.18 MG/DL (ref 0.57–1)
EGFRCR SERPLBLD CKD-EPI 2021: 46.2 ML/MIN/1.73
GLUCOSE SERPL-MCNC: 103 MG/DL (ref 65–99)
POTASSIUM SERPL-SCNC: 4.1 MMOL/L (ref 3.5–5.2)
SODIUM SERPL-SCNC: 139 MMOL/L (ref 136–145)

## 2024-06-26 PROBLEM — J84.9 ILD (INTERSTITIAL LUNG DISEASE): Status: ACTIVE | Noted: 2024-06-26

## 2024-06-26 PROBLEM — J96.11 CHRONIC RESPIRATORY FAILURE WITH HYPOXIA AND HYPERCAPNIA: Status: ACTIVE | Noted: 2024-06-26

## 2024-06-26 PROBLEM — I34.0 MODERATE MITRAL REGURGITATION: Status: ACTIVE | Noted: 2024-06-26

## 2024-06-26 PROBLEM — I07.1 MODERATE TRICUSPID REGURGITATION: Status: ACTIVE | Noted: 2024-06-26

## 2024-06-26 PROBLEM — I48.0 PAROXYSMAL ATRIAL FIBRILLATION: Status: ACTIVE | Noted: 2024-06-26

## 2024-06-26 PROBLEM — J96.12 CHRONIC RESPIRATORY FAILURE WITH HYPOXIA AND HYPERCAPNIA: Status: ACTIVE | Noted: 2024-06-26

## 2024-06-26 PROBLEM — I27.20 PULMONARY HYPERTENSION: Status: ACTIVE | Noted: 2024-06-26

## 2024-06-28 ENCOUNTER — SPECIALTY PHARMACY (OUTPATIENT)
Dept: PHARMACY | Facility: HOSPITAL | Age: 84
End: 2024-06-28
Payer: MEDICARE

## 2024-07-03 ENCOUNTER — DOCUMENTATION (OUTPATIENT)
Dept: PULMONOLOGY | Facility: CLINIC | Age: 84
End: 2024-07-03
Payer: MEDICARE

## 2024-07-03 NOTE — PROGRESS NOTES
Discussed CT Chest with the patient.  Diffuse interstitial lung disease noted.  We will plan to repeat CT Chest in one year.
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
## 2024-07-26 ENCOUNTER — SPECIALTY PHARMACY (OUTPATIENT)
Dept: PHARMACY | Facility: HOSPITAL | Age: 84
End: 2024-07-26
Payer: MEDICARE

## 2024-07-29 ENCOUNTER — HOSPITAL ENCOUNTER (OUTPATIENT)
Dept: CARDIOLOGY | Facility: HOSPITAL | Age: 84
Discharge: HOME | End: 2024-07-29
Admitting: PHYSICIAN ASSISTANT
Payer: MEDICARE

## 2024-07-29 VITALS
HEIGHT: 65 IN | OXYGEN SATURATION: 92 % | BODY MASS INDEX: 30.46 KG/M2 | HEART RATE: 73 BPM | WEIGHT: 182.8 LBS | DIASTOLIC BLOOD PRESSURE: 69 MMHG | SYSTOLIC BLOOD PRESSURE: 138 MMHG

## 2024-07-29 DIAGNOSIS — J84.9 ILD (INTERSTITIAL LUNG DISEASE): ICD-10-CM

## 2024-07-29 DIAGNOSIS — J96.11 CHRONIC RESPIRATORY FAILURE WITH HYPOXIA AND HYPERCAPNIA: ICD-10-CM

## 2024-07-29 DIAGNOSIS — I50.32 CHRONIC HEART FAILURE WITH PRESERVED EJECTION FRACTION (HFPEF): Primary | ICD-10-CM

## 2024-07-29 DIAGNOSIS — J96.12 CHRONIC RESPIRATORY FAILURE WITH HYPOXIA AND HYPERCAPNIA: ICD-10-CM

## 2024-07-29 LAB
ABSOLUTE LUNG FLUID CONTENT: 41 % (ref 20–35)
ANION GAP SERPL CALCULATED.3IONS-SCNC: 15.2 MMOL/L (ref 5–15)
BUN SERPL-MCNC: 21 MG/DL (ref 8–23)
BUN/CREAT SERPL: 17.8 (ref 7–25)
CALCIUM SPEC-SCNC: 9.6 MG/DL (ref 8.6–10.5)
CHLORIDE SERPL-SCNC: 100 MMOL/L (ref 98–107)
CO2 SERPL-SCNC: 27.8 MMOL/L (ref 22–29)
CREAT SERPL-MCNC: 1.18 MG/DL (ref 0.57–1)
EGFRCR SERPLBLD CKD-EPI 2021: 46.2 ML/MIN/1.73
GLUCOSE SERPL-MCNC: 184 MG/DL (ref 65–99)
MAGNESIUM SERPL-MCNC: 2.2 MG/DL (ref 1.6–2.4)
NT-PROBNP SERPL-MCNC: 377.3 PG/ML (ref 0–1800)
POTASSIUM SERPL-SCNC: 4.3 MMOL/L (ref 3.5–5.2)
SODIUM SERPL-SCNC: 143 MMOL/L (ref 136–145)

## 2024-07-29 PROCEDURE — 83735 ASSAY OF MAGNESIUM: CPT | Performed by: PHYSICIAN ASSISTANT

## 2024-07-29 PROCEDURE — 80048 BASIC METABOLIC PNL TOTAL CA: CPT | Performed by: PHYSICIAN ASSISTANT

## 2024-07-29 PROCEDURE — 94726 PLETHYSMOGRAPHY LUNG VOLUMES: CPT | Performed by: PHYSICIAN ASSISTANT

## 2024-07-29 PROCEDURE — 83880 ASSAY OF NATRIURETIC PEPTIDE: CPT | Performed by: PHYSICIAN ASSISTANT

## 2024-07-29 PROCEDURE — 36415 COLL VENOUS BLD VENIPUNCTURE: CPT | Performed by: PHYSICIAN ASSISTANT

## 2024-07-29 PROCEDURE — 99215 OFFICE O/P EST HI 40 MIN: CPT | Performed by: PHYSICIAN ASSISTANT

## 2024-07-29 RX ORDER — SODIUM PHOSPHATE,MONO-DIBASIC 19G-7G/118
1 ENEMA (ML) RECTAL 2 TIMES DAILY WITH MEALS
COMMUNITY

## 2024-07-29 RX ORDER — FUROSEMIDE 40 MG/1
40 TABLET ORAL EVERY OTHER DAY
Qty: 15 TABLET | Refills: 4 | Status: SHIPPED | OUTPATIENT
Start: 2024-07-29

## 2024-07-29 NOTE — PROGRESS NOTES
Heart Failure Clinic  Pharmacist Note     Aliyah Adkins is a 84 y.o. female seen in the Heart Failure Clinic for HFpEF.  Aliyah Adkins reports a good understanding of medications.  She denies any swelling or SOB and says her weight is decreasing.  She is taking Lasix 40mg QOD and she took it yesterday.  She is checking her BP at home and it has been running good for her with no concerns. She reports that she forgot to bring her daily logs with her today.     Medication Use:   Hx of med intolerances:  None related to HF  Retail Rx Management: [facility] pt prefers to remain here as it is in close proximity to her home                                          [facility] ( HF LOKI)   Patient reports Eliquis $47 co-pay FOCUS and Jardiance $47 co-pay (HF LOKI)    Past Medical History:   Diagnosis Date    Arthritis     Hyperlipidemia     Hypertension      ALLERGIES: Patient has no known allergies.  Current Outpatient Medications   Medication Sig Dispense Refill    apixaban (ELIQUIS) 5 MG tablet tablet Take 1 tablet by mouth Every 12 (Twelve) Hours. Indications: Atrial Fibrillation 60 tablet 5    digoxin (LANOXIN) 125 MCG tablet Take 1 tablet by mouth Daily. 30 tablet 4    empagliflozin (JARDIANCE) 10 MG tablet tablet Take 1 tablet by mouth Daily. 30 tablet 4    furosemide (Lasix) 40 MG tablet Take 1 tablet by mouth Every Other Day. 15 tablet 4    gabapentin (NEURONTIN) 100 MG capsule Take 1 capsule by mouth 2 (Two) Times a Day.      glucosamine-chondroitin 500-400 MG capsule capsule Take 1 capsule by mouth 2 (Two) Times a Day With Meals.      metoprolol tartrate (LOPRESSOR) 100 MG tablet Take 1 tablet by mouth 2 (Two) Times a Day. 60 tablet 4    oxazepam (SERAX) 15 MG capsule Take 1 capsule by mouth 2 (Two) Times a Day.      potassium chloride 10 MEQ CR tablet Take 1 tablet by mouth Every Other Day. NOTE: Take only when Lasix is taken 15 tablet 4     No current facility-administered medications for  "this encounter.       Vaccination History:   Pneumonia: PCV 2014; PPSV-23 2017: Patient is eligible for PCV 20 and will discuss with PCP (pt unsure if she has had and will speak with Dr. Keller)  Annual Influenza: Declines   Shingles: Completed Shingrix 2 dose series     Objective  Vitals:    07/29/24 0847   BP: 138/69   BP Location: Left arm   Patient Position: Sitting   Cuff Size: Adult   Pulse: 73   SpO2: 92%   Weight: 82.9 kg (182 lb 12.8 oz)   Height: 165.1 cm (65\")     Wt Readings from Last 3 Encounters:   07/29/24 82.9 kg (182 lb 12.8 oz)   06/19/24 86.4 kg (190 lb 6.4 oz)   06/12/24 86.7 kg (191 lb 3.2 oz)         07/29/24  0847   Weight: 82.9 kg (182 lb 12.8 oz)     Lab Results   Component Value Date    GLUCOSE 184 (H) 07/29/2024    BUN 21 07/29/2024    CREATININE 1.18 (H) 07/29/2024    BCR 17.8 07/29/2024    K 4.3 07/29/2024    CO2 27.8 07/29/2024    CALCIUM 9.6 07/29/2024    ALBUMIN 4.1 05/26/2024    AST 24 05/26/2024    ALT 27 05/26/2024     Lab Results   Component Value Date    WBC 6.66 05/30/2024    HGB 10.1 (L) 05/30/2024    HCT 31.5 (L) 05/30/2024    .8 (H) 05/30/2024     05/30/2024     Lab Results   Component Value Date    TROPONINT 16 (H) 05/26/2024     Lab Results   Component Value Date    PROBNP 377.3 07/29/2024     Results for orders placed during the hospital encounter of 05/25/24    Adult Transthoracic Echo Complete W/ Cont if Necessary Per Protocol    Interpretation Summary    Left ventricular systolic function is hyperdynamic (EF > 70%). Calculated left ventricular EF = 71% Left ventricular ejection fraction appears to be 66 - 70%.    Left ventricular wall thickness is consistent with moderate concentric hypertrophy.    Left ventricular diastolic function was normal.    Moderate mitral valve regurgitation is present.    Moderate tricuspid valve regurgitation is present.    Estimated right ventricular systolic pressure from tricuspid regurgitation is moderately elevated (45-55 " mmHg).    Moderate pulmonary hypertension is present.         GDMT    Drug Class   Drug   Dose Last Dose Adjustment Additional Titration   Notes   ACEi/ARB/ARNI        Beta Blocker Lopressor  100 mg BID 5/2024     MRA        SGLT2i Jardiance  10 mg QD  N/A        Drug Therapy Problems    NO recommendations at this time.         Patient was educated on heart failure medications and the importance of medication adherence. All questions were addressed and patient expressed  understanding.     Thank you for allowing me to participate in the care of your patient,    Valorie Ravi [facility]  07/29/24  10:49 EDT

## 2024-07-29 NOTE — PROGRESS NOTES
Heart Failure Clinic    Date: 07/29/24     Vitals:    07/29/24 0847   BP: 138/69   Pulse: 73   SpO2: 92%    Weight 182.8    Method of arrival: Ambulatory    Weighing self daily: Yes    Monitoring Heart Failure Zones: Yes    Today's HF Zone: Green    Taking medications as prescribed: Yes    Edema No    Shortness of Air: Yes with activity    Number of pillows used at night:<2    Educational Materials given:  avs                                                                         ReDS Value: 41  36-41 Possible Hypervolemic Status      Yasir Guillaume RN 07/29/24 08:49 EDT
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
## 2024-07-31 ENCOUNTER — OFFICE VISIT (OUTPATIENT)
Dept: CARDIOLOGY | Facility: CLINIC | Age: 84
End: 2024-07-31
Payer: MEDICARE

## 2024-07-31 VITALS
DIASTOLIC BLOOD PRESSURE: 64 MMHG | HEIGHT: 65 IN | OXYGEN SATURATION: 93 % | BODY MASS INDEX: 30.75 KG/M2 | HEART RATE: 66 BPM | WEIGHT: 184.6 LBS | SYSTOLIC BLOOD PRESSURE: 132 MMHG

## 2024-07-31 DIAGNOSIS — I48.0 PAROXYSMAL ATRIAL FIBRILLATION: ICD-10-CM

## 2024-07-31 DIAGNOSIS — I25.10 CORONARY ARTERY DISEASE INVOLVING NATIVE CORONARY ARTERY OF NATIVE HEART WITHOUT ANGINA PECTORIS: Primary | ICD-10-CM

## 2024-07-31 DIAGNOSIS — E78.5 DYSLIPIDEMIA, GOAL LDL BELOW 70: Chronic | ICD-10-CM

## 2024-07-31 NOTE — PROGRESS NOTES
"Chief Complaint  Follow-up (Hospital f/u, pt denies CP, complains of SOA, mild leg/ankle swelling.) and Med Management (Tolerating all current medications with no side effects.)    Subjective          Aliyah Adkins presents to [facility] for follow up.    History of Present Illness    Aliyah Adkins was last seen during hospitalization on 5/30/2024.  She had been admitted with paroxysmal atrial fibrillation and acute HFpEF.  She had cardiac catheterization on 5/29/2024 which showed nonobstructive coronary artery disease.    Aliyah follows in the heart failure clinic.    At today's visit Aliyah denies any chest pain.  She does report some shortness of breath.  She states that she can \"hardly walk across the room\" without getting short of breath.  She reports mild lower extremity swelling.      The patient denies any bleeding issues and reports medication compliance with Eliquis.    Objective     Vital Signs:   /64 (BP Location: Left arm, Patient Position: Sitting, Cuff Size: Large Adult)   Pulse 66   Ht 165.1 cm (65\")   Wt 83.7 kg (184 lb 9.6 oz)   SpO2 93%   BMI 30.72 kg/m²       Physical Exam  Constitutional:       Appearance: Normal appearance. She is well-developed.   Cardiovascular:      Rate and Rhythm: Normal rate and regular rhythm.      Heart sounds: No murmur heard.     No friction rub. No gallop.      Comments: Right radial access site is healed.  Pulmonary:      Effort: Pulmonary effort is normal. No respiratory distress.      Breath sounds: Normal breath sounds. No wheezing or rales.   Skin:     General: Skin is warm and dry.   Neurological:      Mental Status: She is alert and oriented to person, place, and time.   Psychiatric:         Mood and Affect: Mood normal.         Behavior: Behavior normal.          Result Review :     CMP          6/12/2024    08:46 6/19/2024    15:11 7/29/2024    08:56   CMP   Glucose 177  103  184    BUN 16  16  21    Creatinine 1.24  " 1.18  1.18    EGFR 43.5  46.2  46.2    Sodium 142  139  143    Potassium 3.4  4.1  4.3    Chloride 100  100  100    Calcium 9.2  9.3  9.6    BUN/Creatinine Ratio 12.9  13.6  17.8    Anion Gap 14.1  11.4  15.2      CBC          5/27/2024    00:15 5/28/2024    03:26 5/30/2024    01:06   CBC   WBC 9.17  7.73  6.66    RBC 3.07  2.83  2.72    Hemoglobin 11.3  10.3  10.1    Hematocrit 35.7  33.0  31.5    .3  116.6  115.8    MCH 36.8  36.4  37.1    MCHC 31.7  31.2  32.1    RDW 21.4  21.0  20.4    Platelets 260  217  207      Lipid Panel          5/30/2024    01:06   Lipid Panel   Total Cholesterol 186    Triglycerides 183    HDL Cholesterol 40    VLDL Cholesterol 32    LDL Cholesterol  114    LDL/HDL Ratio 2.74           ECG 12 Lead    Date/Time: 7/31/2024 9:28 AM  Performed by: Inessa Palafox APRN    Authorized by: Inessa Palafox APRN  Comparison: compared with previous ECG from 5/30/2024  Similar to previous ECG  Rhythm: sinus rhythm  Rate: normal  BPM: 63  T inversion: III, aVF and V1           Most recent echocardiogram  Results for orders placed during the hospital encounter of 05/25/24    Adult Transthoracic Echo Complete W/ Cont if Necessary Per Protocol    Interpretation Summary    Left ventricular systolic function is hyperdynamic (EF > 70%). Calculated left ventricular EF = 71% Left ventricular ejection fraction appears to be 66 - 70%.    Left ventricular wall thickness is consistent with moderate concentric hypertrophy.    Left ventricular diastolic function was normal.    Moderate mitral valve regurgitation is present.    Moderate tricuspid valve regurgitation is present.    Estimated right ventricular systolic pressure from tricuspid regurgitation is moderately elevated (45-55 mmHg).    Moderate pulmonary hypertension is present.      Most recent Stress Test  Results for orders placed during the hospital encounter of 05/25/24    Stress Test With Myocardial Perfusion One Day    Interpretation  Summary    Impressions are consistent with an intermediate risk study.    Left ventricular ejection fraction is normal.    1.  There is a small to medium sized, moderately intense perfusion defect in the mid to apical inferolateral and lateral wall.  This perfusion defect is present both in rest and stress images, however more intense in the stress images. 2 this partially reversible perfusion defect can be due to soft tissue attenuation, however ischemia cannot be entirely excluded on the basis of the study. 3.  LVEF intact at 84%..       Most recent Cardiac Cath  Results for orders placed during the hospital encounter of 05/25/24    Cardiac Catheterization/Vascular Study    Conclusion  Images from the original result were not included.  CARDIAC CATHETERIZATION / INTERVENTION REPORT        DATE OF PROCEDURE: 5/29/2024      INDICATION FOR PROCEDURE: shortness of breath  - 84-year-old female with dyspnea on exertion paroxysmal A-fib.  - Her stress test was abnormal suggesting inferolateral perfusion defect.  - Patient was recommended coronary angiogram.      PRE PROCEDURE DIAGNOSIS:  1.  Shortness of breath concerning for anginal equivalent  2.  Abnormal stress test.      POST PROCEDURE DIAGNOSIS:  Mild luminal irregularities in LAD.  Distal LAD small.  Mild luminal irregularities in LCx/OM.  30-40% mid RCA lesion, however there is no hemodynamically significant lesion.  LVEF 60-65% no gross regional wall motion abnormalities.  LVEDP 10 mmHg.      PROCEDURE PERFORMED:  1. Selective Coronary Angiogram  2. Left heart catheretization  3. Left Ventriculography      DESCRIPTION OF PROCEDURE:  Prior to the procedure risk, benefits and possible alternative were discussed with the patient and informed consent was obtained. Patient was brought to cardiac cath lab table in post absorbtive state. Patient was prepped and drape in usual sterile fashion. IV Versed and Fentanyl was used for moderate sedation. 2% Lidocaine was used  for topical anesthesia.    R radial arterial site was prepped and a micropuncture needle was used to access the artery and a 5 F slender sheath was placed. 2.5 mg of Verapamil and 200 mcg of NTG was given through the sheath.    RCA was engaged with a JR4 catheter and left main was engaged with a JL 3.5 catheter.  Angiograms were taken multiple views.  Pigtail catheter was used to enter the LV, LV pressures were obtained, LV gram was performed using 30 mL contrast at 10 mL/s injection.  Pigtail catheter was flushed with saline and pulled back under continuous pressure monitoring.    After completion of procedure all catheters were removed.  Tumor blush was removed and hemostasis successfully obtained using transradial band.  Patient was transferred to recovery in stable condition.      DIAGNOSTIC FINDINGS:  LM: Large calibre vessel , normal take off from left cusp, divides into LAD and Lcx.  Left main free of disease.    LAD: Large-caliber vessel proximally.  After D1 it becomes somewhat of a small caliber vessel, distal LAD barely reaches the apex and small in caliber.  There is mild luminal irregularities in LAD proper.  D1 is a medium caliber vessel without any significant lesion.    LCX: Large-caliber vessel in the proximal segment.  Then becomes medium in caliber, gives a small caliber posterolateral branch.  OM1 is a medium caliber vessel with luminal irregularities.  OM 2 is small.    RCA: Large calibre, dominant artery, normal take off from right cusp.  30-40% lesion in the midsegment.  Gives off a medium to large caliber RPDA supplies a large territory.  There is no significant posterolateral branch.    Left Ventriculography: LV systolic function was normal with visual estimated EF of 60-65%. No wall motion abnormalities. No significant mitral regurgitation noted.    Left heart catheterization: Left ventricular systolic pressure 138 mmHg.  LVEDP: 10 mmHg. No gradient across the aortic valve on pull  back.    INTERVENTION: None    COMPLICATIONS : None      MISCELLANEOUS:  Clinical frailty: 3- Managing Well  ASA: 2=2- Mild to moderate systemic disease, medially well controlled, with no functional limitation  Mallampati: Class 2=2- Able to visualize the soft palate, fauces, uvula.  The anterior & posterior tonsilar pillars are hidden by the tongue.  EBL: Less than 10cc  Face to face mdoerate conscious  sedation time : None (no sedation given).      ASSESSMENT AND PLAN:  1.  Luminal irregularities in LAD and LCx.  2.  30-40% mid LAD lesion.  3.  LVEF 60-65%, no regional motion abnormalities.  LVEDP 10 mmHg.  4.  Patient does not have angiographically significant coronary artery disease.  No indication for PCI.  5.  Medical therapy and risk factor modifications.          Ezio Flores MD  05/29/24  12:08 EDT      Current Outpatient Medications   Medication Sig Dispense Refill    apixaban (ELIQUIS) 5 MG tablet tablet Take 1 tablet by mouth Every 12 (Twelve) Hours. Indications: Atrial Fibrillation 60 tablet 5    digoxin (LANOXIN) 125 MCG tablet Take 1 tablet by mouth Daily. 30 tablet 4    empagliflozin (JARDIANCE) 10 MG tablet tablet Take 1 tablet by mouth Daily. 30 tablet 4    furosemide (Lasix) 40 MG tablet Take 1 tablet by mouth Every Other Day. 15 tablet 4    gabapentin (NEURONTIN) 100 MG capsule Take 1 capsule by mouth 2 (Two) Times a Day.      glucosamine-chondroitin 500-400 MG capsule capsule Take 1 capsule by mouth 2 (Two) Times a Day With Meals.      metoprolol tartrate (LOPRESSOR) 100 MG tablet Take 1 tablet by mouth 2 (Two) Times a Day. 60 tablet 4    oxazepam (SERAX) 15 MG capsule Take 1 capsule by mouth 2 (Two) Times a Day.      potassium chloride 10 MEQ CR tablet Take 1 tablet by mouth Every Other Day. NOTE: Take only when Lasix is taken 15 tablet 4     No current facility-administered medications for this visit.            Assessment and Plan    Problem List Items Addressed This Visit          Cardiac  and Vasculature    Coronary artery disease involving native coronary artery of native heart - Primary (Chronic)    Paroxysmal atrial fibrillation (Chronic)    Dyslipidemia, goal LDL below 55 (Chronic)    Overview     5/30/2024 total cholesterol 186, triglycerides 183, HDL 40, and                 Follow Up     Medications were reviewed with the patient.    ASCVD/CAD is stable.  Continue metoprolol    Continue Eliquis for paroxysmal atrial fibrillation.    HTN is controlled.  Continue metoprolol.    Patient does have dyslipidemia, however she is intolerant of statins and Zetia.  At this time I have not added additional medications.  She is hesitant to try due to the severe myalgias she has had with previous medications.    With regard to her dyspnea with minimal exertion, I have asked her to speak with her pulmonologist regarding this complaint.  This likely has more to do with her lung status than with cardiac status.    Return in about 6 months (around 1/31/2025).    Patient was given instructions and counseling regarding her condition or for health maintenance advice. Please see specific information pulled into the AVS if appropriate.

## 2024-08-22 ENCOUNTER — OFFICE VISIT (OUTPATIENT)
Dept: PULMONOLOGY | Facility: CLINIC | Age: 84
End: 2024-08-22
Payer: MEDICARE

## 2024-08-22 VITALS
HEIGHT: 65 IN | WEIGHT: 180.8 LBS | SYSTOLIC BLOOD PRESSURE: 122 MMHG | HEART RATE: 79 BPM | DIASTOLIC BLOOD PRESSURE: 78 MMHG | TEMPERATURE: 97.5 F | OXYGEN SATURATION: 91 % | BODY MASS INDEX: 30.12 KG/M2

## 2024-08-22 DIAGNOSIS — J96.12 CHRONIC RESPIRATORY FAILURE WITH HYPOXIA AND HYPERCAPNIA: ICD-10-CM

## 2024-08-22 DIAGNOSIS — J84.9 ILD (INTERSTITIAL LUNG DISEASE): Primary | ICD-10-CM

## 2024-08-22 DIAGNOSIS — E66.9 OBESITY (BMI 30-39.9): ICD-10-CM

## 2024-08-22 DIAGNOSIS — J96.11 CHRONIC RESPIRATORY FAILURE WITH HYPOXIA AND HYPERCAPNIA: ICD-10-CM

## 2024-08-22 PROCEDURE — G2211 COMPLEX E/M VISIT ADD ON: HCPCS | Performed by: NURSE PRACTITIONER

## 2024-08-22 PROCEDURE — 1159F MED LIST DOCD IN RCRD: CPT | Performed by: NURSE PRACTITIONER

## 2024-08-22 PROCEDURE — 99214 OFFICE O/P EST MOD 30 MIN: CPT | Performed by: NURSE PRACTITIONER

## 2024-08-22 PROCEDURE — 1160F RVW MEDS BY RX/DR IN RCRD: CPT | Performed by: NURSE PRACTITIONER

## 2024-08-22 NOTE — PROGRESS NOTES
"Chief Complaint  interstitial lung disease    Subjective          Aliyah Adkins presents to [facility] for   History of Present Illness    Ms. Adkins is a 84 year old female with a medical history significant for arthritis, CAD, atrial fibrillation, CHF, hyperlipidemia, and hypertension.    She presents today for follow up on ILD.  She reports that breathing has been at baseline.  She is compliant with supplemental oxygen at 4 L.  She states that she does not like her POC and would like some smaller tanks.  She thinks that these would work better for her.  She tells me that she was unable to use the trilogy machine at night.  She reports that it smothered her and she was unable to use it.     Objective   Vital Signs:   /78   Pulse 79   Temp 97.5 °F (36.4 °C)   Ht 165.1 cm (65\")   Wt 82 kg (180 lb 12.8 oz)   SpO2 91%   BMI 30.09 kg/m²         Physical Exam    GENERAL APPEARANCE: Well developed, well nourished, alert and cooperative, and appears to be in no acute distress.    HEAD: normocephalic. Atraumatic.    EYES: PERRL, EOMI. Vision is grossly intact.    THROAT: Oral cavity and pharynx normal. No inflammation, swelling, exudate, or lesions.     NECK: Neck supple.  No thyromegaly.    CARDIAC: Normal S1 and S2. No S3, S4 or murmurs. Rhythm is regular.     RESPIRATORY:Bilateral air entry positive. Bilateral diminished breath sounds. Bilateral crackles in the bases.    GI: Positive bowel sounds. Soft, nondistended, nontender.     MUSCULOSKELETAL: No significant deformity or joint abnormality. No edema. Peripheral pulses intact. No varicosities.    NEUROLOGICAL: Strength and sensation symmetric and intact throughout.     PSYCHIATRIC: The mental examination revealed the patient was oriented to person, place, and time.       Estimated body mass index is 30.09 kg/m² as calculated from the following:    Height as of this encounter: 165.1 cm (65\").    Weight as " "of this encounter: 82 kg (180 lb 12.8 oz).        Result Review :   The following data was reviewed by: LATONYA Wagner on 08/22/2024:  Common labs          6/12/2024    08:46 6/19/2024    15:11 7/29/2024    08:56   Common Labs   Glucose 177  103  184    BUN 16  16  21    Creatinine 1.24  1.18  1.18    Sodium 142  139  143    Potassium 3.4  4.1  4.3    Chloride 100  100  100    Calcium 9.2  9.3  9.6           PFT:  She was unable to compelte    Low dose lung cancer screening:NA    Previous chest imaging:    Hi resolution CT Chest 6/19/24    FINDINGS:    LUNGS AND PLEURAL SPACES:  Diffuse interstitial lung disease.  No  pneumothorax.  No significant effusion.    HEART:  Coronary artery calcifications.  No significant pericardial  effusion.    BONES/JOINTS:  Unremarkable.  No acute fracture.  No dislocation.    SOFT TISSUES:  Stable right breast nodule.    VASCULATURE:  See above.    LYMPH NODES:  Unremarkable.  No enlarged lymph nodes.     IMPRESSION:    Diffuse interstitial lung disease.        This report was finalized on 6/19/2024 9:20 AM by Dr. Zane Caal MD.    Alpha-1 antitrypsin screening:Not complete    STOP-Bang Score:   NA  [place] Sleepiness Scale:   NA      ABG:    pH No results found for: \"PHART\"   pO2 No results found for: \"PO2ART\"   pCO2 No results found for: \"VJZ1PDZ\"   HCO3 No results found for: \"GCP5EGI\"                      Assessment and Plan    Problem List Items Addressed This Visit          Pulmonary and Pneumonias    ILD (interstitial lung disease) - Primary    Chronic respiratory failure with hypoxia and hypercapnia    Relevant Orders    Miscellaneous DME     Other Visit Diagnoses       Obesity (BMI 30-39.9)                Aliyah Adkins  reports that she quit smoking about 34 years ago. Her smoking use included cigarettes. She started smoking about 60 years ago. She has a 26 pack-year smoking history. She has been exposed to tobacco smoke. She has never used smokeless tobacco. "     Hi resolution CT Chest was notable for diffuse interstitial lung disease.    She is compliant with supplemental oxygen at 4 L.  She would like to get some smaller tanks instead of her POC.    Will also send order for  of trilogy machine.  She was unable to tolerate it due to smothering.    We discussed weight loss.    Follow Up   Return in about 6 months (around 2/22/2025).  Patient was given instructions and counseling regarding her condition or for health maintenance advice. Please see specific information pulled into the AVS if appropriate.

## 2024-09-30 ENCOUNTER — HOSPITAL ENCOUNTER (OUTPATIENT)
Dept: CARDIOLOGY | Facility: HOSPITAL | Age: 84
Discharge: HOME | End: 2024-09-30
Admitting: PHYSICIAN ASSISTANT
Payer: MEDICARE

## 2024-09-30 VITALS
HEART RATE: 68 BPM | BODY MASS INDEX: 29.32 KG/M2 | WEIGHT: 176 LBS | SYSTOLIC BLOOD PRESSURE: 122 MMHG | DIASTOLIC BLOOD PRESSURE: 59 MMHG | OXYGEN SATURATION: 98 % | HEIGHT: 65 IN

## 2024-09-30 DIAGNOSIS — I48.0 PAROXYSMAL ATRIAL FIBRILLATION: Chronic | ICD-10-CM

## 2024-09-30 DIAGNOSIS — I50.32 CHRONIC HEART FAILURE WITH PRESERVED EJECTION FRACTION (HFPEF): Primary | ICD-10-CM

## 2024-09-30 DIAGNOSIS — J84.9 ILD (INTERSTITIAL LUNG DISEASE): ICD-10-CM

## 2024-09-30 DIAGNOSIS — Z79.01 CHRONIC ANTICOAGULATION: ICD-10-CM

## 2024-09-30 LAB
ABSOLUTE LUNG FLUID CONTENT: 44 % (ref 20–35)
ANION GAP SERPL CALCULATED.3IONS-SCNC: 14.1 MMOL/L (ref 5–15)
BUN SERPL-MCNC: 19 MG/DL (ref 8–23)
BUN/CREAT SERPL: 16.2 (ref 7–25)
CALCIUM SPEC-SCNC: 9.3 MG/DL (ref 8.6–10.5)
CHLORIDE SERPL-SCNC: 97 MMOL/L (ref 98–107)
CO2 SERPL-SCNC: 26.9 MMOL/L (ref 22–29)
CREAT SERPL-MCNC: 1.17 MG/DL (ref 0.57–1)
EGFRCR SERPLBLD CKD-EPI 2021: 46.7 ML/MIN/1.73
GLUCOSE SERPL-MCNC: 248 MG/DL (ref 65–99)
MAGNESIUM SERPL-MCNC: 1.9 MG/DL (ref 1.6–2.4)
NT-PROBNP SERPL-MCNC: 262.5 PG/ML (ref 0–1800)
POTASSIUM SERPL-SCNC: 3.8 MMOL/L (ref 3.5–5.2)
SODIUM SERPL-SCNC: 138 MMOL/L (ref 136–145)

## 2024-09-30 PROCEDURE — 94726 PLETHYSMOGRAPHY LUNG VOLUMES: CPT | Performed by: PHYSICIAN ASSISTANT

## 2024-09-30 PROCEDURE — 83880 ASSAY OF NATRIURETIC PEPTIDE: CPT | Performed by: PHYSICIAN ASSISTANT

## 2024-09-30 PROCEDURE — 83735 ASSAY OF MAGNESIUM: CPT | Performed by: PHYSICIAN ASSISTANT

## 2024-09-30 PROCEDURE — 36415 COLL VENOUS BLD VENIPUNCTURE: CPT | Performed by: PHYSICIAN ASSISTANT

## 2024-09-30 PROCEDURE — 80048 BASIC METABOLIC PNL TOTAL CA: CPT | Performed by: PHYSICIAN ASSISTANT

## 2024-09-30 RX ORDER — METOPROLOL TARTRATE 100 MG
100 TABLET ORAL 2 TIMES DAILY
Qty: 60 TABLET | Refills: 4 | Status: SHIPPED | OUTPATIENT
Start: 2024-09-30

## 2024-09-30 NOTE — PROGRESS NOTES
Heart Failure Clinic    Date: 09/30/24     Vitals:    09/30/24 0909   BP: 122/59   Pulse: 68   SpO2: 98%    Weight 176    Method of arrival: Ambulatory O2@4lpm    Weighing self daily: Yes    Monitoring Heart Failure Zones: Yes    Today's HF Zone: Green    Taking medications as prescribed: Yes    Edema No    Shortness of Air: Yes    Number of pillows used at night:<2    Educational Materials given:  avs                                                                         ReDS Value: 44  Over 41 Hypervolemic Status      Yasir Guillaume RN 09/30/24 09:12 EDT
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
## 2024-09-30 NOTE — PROGRESS NOTES
Heart Failure Clinic  Pharmacist Note     Aliyah Adkins is a 84 y.o. female seen in the Heart Failure Clinic for HFpEF.      Aliyah Adkins reports a good understanding of medications.  She denies any swelling but reports some SOB with activity, but also reports that this is her normal and this has not worsened. She is taking Lasix 40mg QOD and she took it today.  She is checking her BP at home and it has been running good for her with no concerns.     Medication Use:   Hx of med intolerances:  None related to HF  Retail Rx Management: Romaine Wall pt prefers to remain here as it is in close proximity to her home                                          FROY Dhillon ( HF LOKI)   Patient reports Eliquis $47 co-pay FOCUS and Jardiance $47 co-pay (HF LOKI)    Past Medical History:   Diagnosis Date    Arthritis     Hyperlipidemia     Hypertension      ALLERGIES: Ezetimibe and Statins  Current Outpatient Medications   Medication Sig Dispense Refill    apixaban (ELIQUIS) 5 MG tablet tablet Take 1 tablet by mouth Every 12 (Twelve) Hours. Indications: Atrial Fibrillation 60 tablet 5    digoxin (LANOXIN) 125 MCG tablet Take 1 tablet by mouth Daily. 30 tablet 4    empagliflozin (JARDIANCE) 10 MG tablet tablet Take 1 tablet by mouth Daily. 30 tablet 4    furosemide (Lasix) 40 MG tablet Take 1 tablet by mouth Every Other Day. 15 tablet 4    gabapentin (NEURONTIN) 100 MG capsule Take 1 capsule by mouth 2 (Two) Times a Day.      glucosamine-chondroitin 500-400 MG capsule capsule Take 1 capsule by mouth 2 (Two) Times a Day With Meals.      metoprolol tartrate (LOPRESSOR) 100 MG tablet Take 1 tablet by mouth 2 (Two) Times a Day. 60 tablet 4    oxazepam (SERAX) 15 MG capsule Take 1 capsule by mouth 2 (Two) Times a Day.      potassium chloride 10 MEQ CR tablet Take 1 tablet by mouth Every Other Day. NOTE: Take only when Lasix is taken 15 tablet 4     No current facility-administered medications for this encounter.  "      Vaccination History:   Pneumonia: PCV 2014; PPSV-23 2017: Patient is eligible for PCV 20 and will discuss with PCP (pt unsure if she has had and will speak with Dr. Keller)  Annual Influenza: Declines   Shingles: Completed Shingrix 2 dose series     Objective  Vitals:    09/30/24 0909   BP: 122/59   BP Location: Left arm   Patient Position: Sitting   Cuff Size: Adult   Pulse: 68   SpO2: 98%   Weight: 79.8 kg (176 lb)   Height: 165.1 cm (65\")     Wt Readings from Last 3 Encounters:   09/30/24 79.8 kg (176 lb)   08/22/24 82 kg (180 lb 12.8 oz)   07/31/24 83.7 kg (184 lb 9.6 oz)         09/30/24  0909   Weight: 79.8 kg (176 lb)     Lab Results   Component Value Date    GLUCOSE 248 (H) 09/30/2024    BUN 19 09/30/2024    CREATININE 1.17 (H) 09/30/2024    BCR 16.2 09/30/2024    K 3.8 09/30/2024    CO2 26.9 09/30/2024    CALCIUM 9.3 09/30/2024    ALBUMIN 4.1 05/26/2024    AST 24 05/26/2024    ALT 27 05/26/2024     Lab Results   Component Value Date    WBC 6.66 05/30/2024    HGB 10.1 (L) 05/30/2024    HCT 31.5 (L) 05/30/2024    .8 (H) 05/30/2024     05/30/2024     Lab Results   Component Value Date    TROPONINT 16 (H) 05/26/2024     Lab Results   Component Value Date    PROBNP 262.5 09/30/2024     Results for orders placed during the hospital encounter of 05/25/24    Adult Transthoracic Echo Complete W/ Cont if Necessary Per Protocol    Interpretation Summary    Left ventricular systolic function is hyperdynamic (EF > 70%). Calculated left ventricular EF = 71% Left ventricular ejection fraction appears to be 66 - 70%.    Left ventricular wall thickness is consistent with moderate concentric hypertrophy.    Left ventricular diastolic function was normal.    Moderate mitral valve regurgitation is present.    Moderate tricuspid valve regurgitation is present.    Estimated right ventricular systolic pressure from tricuspid regurgitation is moderately elevated (45-55 mmHg).    Moderate pulmonary hypertension " is present.         GDMT    Drug Class   Drug   Dose Last Dose Adjustment Additional Titration   Notes   ACEi/ARB/ARNI        Beta Blocker Lopressor  100 mg BID 5/2024     MRA        SGLT2i Jardiance  10 mg QD  N/A        Drug Therapy Problems    No recommendations at this time.       Patient was educated on heart failure medications and the importance of medication adherence. All questions were addressed and patient expressed  understanding.     Thank you for allowing me to participate in the care of your patient,    Valorie Ravi, PharmD  09/30/24  11:59 EDT

## 2024-11-07 DIAGNOSIS — I48.0 PAROXYSMAL ATRIAL FIBRILLATION: Primary | Chronic | ICD-10-CM

## 2024-11-08 RX ORDER — POTASSIUM CHLORIDE 750 MG/1
TABLET, EXTENDED RELEASE ORAL
Qty: 15 TABLET | Refills: 4 | Status: SHIPPED | OUTPATIENT
Start: 2024-11-08

## 2024-12-09 DIAGNOSIS — I48.0 PAROXYSMAL ATRIAL FIBRILLATION: Chronic | ICD-10-CM

## 2025-01-09 ENCOUNTER — HOSPITAL ENCOUNTER (OUTPATIENT)
Dept: CARDIOLOGY | Facility: HOSPITAL | Age: 85
Discharge: HOME | End: 2025-01-09
Admitting: PHYSICIAN ASSISTANT
Payer: MEDICARE

## 2025-01-09 VITALS
HEART RATE: 65 BPM | HEIGHT: 65 IN | OXYGEN SATURATION: 94 % | DIASTOLIC BLOOD PRESSURE: 52 MMHG | WEIGHT: 170.8 LBS | SYSTOLIC BLOOD PRESSURE: 136 MMHG | BODY MASS INDEX: 28.46 KG/M2

## 2025-01-09 DIAGNOSIS — I25.10 CORONARY ARTERY DISEASE INVOLVING NATIVE CORONARY ARTERY OF NATIVE HEART WITHOUT ANGINA PECTORIS: Chronic | ICD-10-CM

## 2025-01-09 DIAGNOSIS — I50.32 CHRONIC HEART FAILURE WITH PRESERVED EJECTION FRACTION (HFPEF): Primary | ICD-10-CM

## 2025-01-09 DIAGNOSIS — J96.12 CHRONIC RESPIRATORY FAILURE WITH HYPOXIA AND HYPERCAPNIA: ICD-10-CM

## 2025-01-09 DIAGNOSIS — J44.9 CHRONIC OBSTRUCTIVE PULMONARY DISEASE, UNSPECIFIED COPD TYPE: ICD-10-CM

## 2025-01-09 DIAGNOSIS — J96.11 CHRONIC RESPIRATORY FAILURE WITH HYPOXIA AND HYPERCAPNIA: ICD-10-CM

## 2025-01-09 DIAGNOSIS — I48.0 PAROXYSMAL ATRIAL FIBRILLATION: Chronic | ICD-10-CM

## 2025-01-09 DIAGNOSIS — J84.9 ILD (INTERSTITIAL LUNG DISEASE): ICD-10-CM

## 2025-01-09 LAB
ABSOLUTE LUNG FLUID CONTENT: 41 % (ref 20–35)
ANION GAP SERPL CALCULATED.3IONS-SCNC: 10.9 MMOL/L (ref 5–15)
BUN SERPL-MCNC: 22 MG/DL (ref 8–23)
BUN/CREAT SERPL: 19.6 (ref 7–25)
CALCIUM SPEC-SCNC: 9.4 MG/DL (ref 8.6–10.5)
CHLORIDE SERPL-SCNC: 97 MMOL/L (ref 98–107)
CO2 SERPL-SCNC: 33.1 MMOL/L (ref 22–29)
CREAT SERPL-MCNC: 1.12 MG/DL (ref 0.57–1)
EGFRCR SERPLBLD CKD-EPI 2021: 49.2 ML/MIN/1.73
GLUCOSE SERPL-MCNC: 186 MG/DL (ref 65–99)
MAGNESIUM SERPL-MCNC: 1.9 MG/DL (ref 1.6–2.4)
NT-PROBNP SERPL-MCNC: 247.4 PG/ML (ref 0–1800)
POTASSIUM SERPL-SCNC: 4 MMOL/L (ref 3.5–5.2)
SODIUM SERPL-SCNC: 141 MMOL/L (ref 136–145)

## 2025-01-09 PROCEDURE — 36415 COLL VENOUS BLD VENIPUNCTURE: CPT | Performed by: PHYSICIAN ASSISTANT

## 2025-01-09 PROCEDURE — 83880 ASSAY OF NATRIURETIC PEPTIDE: CPT | Performed by: PHYSICIAN ASSISTANT

## 2025-01-09 PROCEDURE — 80048 BASIC METABOLIC PNL TOTAL CA: CPT | Performed by: PHYSICIAN ASSISTANT

## 2025-01-09 PROCEDURE — 83735 ASSAY OF MAGNESIUM: CPT | Performed by: PHYSICIAN ASSISTANT

## 2025-01-09 PROCEDURE — 94726 PLETHYSMOGRAPHY LUNG VOLUMES: CPT | Performed by: PHYSICIAN ASSISTANT

## 2025-01-09 RX ORDER — CHLORDIAZEPOXIDE HYDROCHLORIDE 10 MG/1
10 CAPSULE, GELATIN COATED ORAL 2 TIMES DAILY
COMMUNITY
Start: 2024-12-04

## 2025-01-09 RX ORDER — FUROSEMIDE 40 MG/1
40 TABLET ORAL EVERY OTHER DAY
Qty: 15 TABLET | Refills: 4 | Status: SHIPPED | OUTPATIENT
Start: 2025-01-09
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
## 2025-01-09 NOTE — PROGRESS NOTES
Heart Failure Clinic  Pharmacist Note     Aliyah Adkins is a 84 y.o. female seen in the Heart Failure Clinic for HFpEF.      Aliyah Adkins reports a good understanding of medications.  She denies any swelling but reports some SOB with activity and some SOB today, but usually has not been experiencing any lately, other than today. She reports that she is taking Lasix 40mg qod. She reports that she has not checked her BP in a while, but reports that they were normally running good for her before.     She is curious if her Jardiance is covered.     Medication Use:   Hx of med intolerances:  None related to HF  Retail Rx Management: Romaine Avila- pt prefers to remain here as it is in close proximity to her home    Patient reports Eliquis $47 co-pay pt is ok with and Jardiance [employer] approved as of 1/7/25.     Past Medical History:   Diagnosis Date    Arthritis     Hyperlipidemia     Hypertension      ALLERGIES: Ezetimibe and Statins  Current Outpatient Medications   Medication Sig Dispense Refill    apixaban (ELIQUIS) 5 MG tablet tablet Take 1 tablet by mouth Every 12 (Twelve) Hours. Indications: Atrial Fibrillation 56 tablet 0    chlordiazePOXIDE (LIBRIUM) 10 MG capsule Take 1 capsule by mouth 2 (Two) Times a Day.      digoxin (LANOXIN) 125 MCG tablet Take 1 tablet by mouth Daily. 30 tablet 4    empagliflozin (Jardiance) 10 MG tablet tablet Take 1 tablet by mouth Daily for 180 days.      furosemide (Lasix) 40 MG tablet Take 1 tablet by mouth Every Other Day. 15 tablet 4    gabapentin (NEURONTIN) 100 MG capsule Take 1 capsule by mouth 2 (Two) Times a Day.      glucosamine-chondroitin 500-400 MG capsule capsule Take 1 capsule by mouth 2 (Two) Times a Day With Meals.      metoprolol tartrate (LOPRESSOR) 100 MG tablet Take 1 tablet by mouth 2 (Two) Times a Day. 60 tablet 4    potassium chloride 10 MEQ CR tablet TAKE 1 TABLET BY MOUTH EVERY OTHER DAY. TAKE ONLY WHEN FUROSEMIDE IS TAKEN 15 tablet 4     No current  "facility-administered medications for this encounter.       Vaccination History:   Pneumonia: PCV 2014; PPSV-23 2017: Patient is eligible for PCV 20 and will discuss with PCP (pt unsure if she has had and will speak with Dr. Keller)  Annual Influenza: Declines   Shingles: Completed Shingrix 2 dose series     Objective  Vitals:    01/09/25 1252   BP: 136/52   BP Location: Left arm   Patient Position: Sitting   Cuff Size: Adult   Pulse: 65   SpO2: 94%   Weight: 77.5 kg (170 lb 12.8 oz)   Height: 165.1 cm (65\")       Wt Readings from Last 3 Encounters:   01/09/25 77.5 kg (170 lb 12.8 oz)   09/30/24 79.8 kg (176 lb)   08/22/24 82 kg (180 lb 12.8 oz)         01/09/25  1252   Weight: 77.5 kg (170 lb 12.8 oz)       Lab Results   Component Value Date    GLUCOSE 186 (H) 01/09/2025    BUN 22 01/09/2025    CREATININE 1.12 (H) 01/09/2025    BCR 19.6 01/09/2025    K 4.0 01/09/2025    CO2 33.1 (H) 01/09/2025    CALCIUM 9.4 01/09/2025    ALBUMIN 4.1 05/26/2024    AST 24 05/26/2024    ALT 27 05/26/2024     Lab Results   Component Value Date    WBC 6.66 05/30/2024    HGB 10.1 (L) 05/30/2024    HCT 31.5 (L) 05/30/2024    .8 (H) 05/30/2024     05/30/2024     Lab Results   Component Value Date    TROPONINT 16 (H) 05/26/2024     Lab Results   Component Value Date    PROBNP 247.4 01/09/2025     Results for orders placed during the hospital encounter of 05/25/24    Adult Transthoracic Echo Complete W/ Cont if Necessary Per Protocol    Interpretation Summary    Left ventricular systolic function is hyperdynamic (EF > 70%). Calculated left ventricular EF = 71% Left ventricular ejection fraction appears to be 66 - 70%.    Left ventricular wall thickness is consistent with moderate concentric hypertrophy.    Left ventricular diastolic function was normal.    Moderate mitral valve regurgitation is present.    Moderate tricuspid valve regurgitation is present.    Estimated right ventricular systolic pressure from tricuspid " regurgitation is moderately elevated (45-55 mmHg).    Moderate pulmonary hypertension is present.         GDMT    Drug Class   Drug   Dose Last Dose Adjustment Additional Titration   Notes   ACEi/ARB/ARNI        Beta Blocker Lopressor  100 mg BID 5/2024     MRA        SGLT2i Jardiance  10 mg QD  N/A    Lasix 40mg QOD      Drug Therapy Problems    Jardiance assistance      Recommendations    Jardiance FreeMed was approved. Called and spoke with representative and patient should be receiving her first shipment in 3-7 days. Plan is to call after she has received it and get her set up on autofill- outreach communication made.       Patient was educated on heart failure medications and the importance of medication adherence. All questions were addressed and patient expressed  understanding.     Thank you for allowing me to participate in the care of your patient,    Valorie Ravi, PharmD  01/09/25  13:29 EST

## 2025-01-09 NOTE — PROGRESS NOTES
Heart Failure Clinic    Date: 01/09/25     Vitals:    01/09/25 1252   BP: 136/52   Pulse: 65   SpO2: 94%    Weight 170    Method of arrival: Ambulatory    Weighing self daily: Yes    Monitoring Heart Failure Zones: Most days    Today's HF Zone: Greenish/Yellow    Taking medications as prescribed: Yes    Edema No    Shortness of Air: Yes    Number of pillows used at night:<2    Educational Materials given:  AVS                                                                         ReDS Value: 41  36-41 Possible Hypervolemic Status      Donnie Banerjee MA 01/09/25 12:53 EST

## 2025-01-31 ENCOUNTER — OFFICE VISIT (OUTPATIENT)
Dept: CARDIOLOGY | Facility: CLINIC | Age: 85
End: 2025-01-31
Payer: MEDICARE

## 2025-01-31 VITALS
HEIGHT: 65 IN | HEART RATE: 66 BPM | SYSTOLIC BLOOD PRESSURE: 128 MMHG | BODY MASS INDEX: 28.59 KG/M2 | OXYGEN SATURATION: 94 % | WEIGHT: 171.6 LBS | DIASTOLIC BLOOD PRESSURE: 72 MMHG

## 2025-01-31 DIAGNOSIS — E78.5 DYSLIPIDEMIA, GOAL LDL BELOW 70: Chronic | ICD-10-CM

## 2025-01-31 DIAGNOSIS — I48.0 PAROXYSMAL ATRIAL FIBRILLATION: Chronic | ICD-10-CM

## 2025-01-31 DIAGNOSIS — I25.10 CORONARY ARTERY DISEASE INVOLVING NATIVE CORONARY ARTERY OF NATIVE HEART WITHOUT ANGINA PECTORIS: Primary | Chronic | ICD-10-CM

## 2025-01-31 RX ORDER — ROSUVASTATIN CALCIUM 10 MG/1
10 TABLET, COATED ORAL DAILY
Qty: 30 TABLET | Refills: 11 | Status: SHIPPED | OUTPATIENT
Start: 2025-01-31

## 2025-01-31 NOTE — PROGRESS NOTES
"Chief Complaint  Follow-up (Denies CP, complains of SOA on exertion or without 3liters of O2.) and Med Management (Tolerating all current medications.)    Subjective          Aliyah Adkins presents to [facility] CARDIOLOGY for follow up.    History of Present Illness  History of Present Illness  The patient is an 84-year-old female with coronary artery disease, dyslipidemia, and paroxysmal atrial fibrillation. She was last seen in the clinic on 07/31/2024. She was stable from a cardiac standpoint. No changes were made to her medications.    She reports no chest pain or palpitations. She has not experienced any bleeding complications while on Eliquis. However, she is encountering difficulties in obtaining Eliquis due to its high cost, which has escalated from $ 47 to over $ 500 within a month. She recently consulted with Sola at the heart failure clinic, who informed her that the cost of Eliquis would decrease in July and August. She has a follow-up appointment scheduled with Sola in April. She has been advised by Dr. Ruiz to inform him if she is unable to procure Eliquis or Jardiance upon her discharge from the hospital.    She experiences occasional mild lower extremity edema, which she attributes to poor circulation. She has a history of varicose veins. She has lost weight, decreasing from 196 pounds during her hospital admission in May 2024 to her current weight of 171 pounds.     She has previously experienced leg cramps with statin therapy, which have disrupted her sleep. She is considering a trial of a lower dose of rosuvastatin, administered 2 to 3 times per week.         Objective     Vital Signs:   /72 (BP Location: Left arm, Patient Position: Sitting, Cuff Size: Adult)   Pulse 66   Ht 165.1 cm (65\")   Wt 77.8 kg (171 lb 9.6 oz)   SpO2 94%   BMI 28.56 kg/m²       Physical Exam  Vitals reviewed.   Constitutional:       Appearance: Normal appearance. She is well-developed.    "   Interventions: Nasal cannula in place.   Cardiovascular:      Rate and Rhythm: Normal rate and regular rhythm.      Heart sounds: No murmur heard.     No friction rub. No gallop.      Comments: Varicosities noted  Pulmonary:      Effort: Pulmonary effort is normal. No respiratory distress.      Breath sounds: Normal breath sounds. No wheezing or rales.   Musculoskeletal:      Right lower leg: No edema.      Left lower leg: No edema.   Skin:     General: Skin is warm and dry.   Neurological:      Mental Status: She is alert and oriented to person, place, and time.   Psychiatric:         Mood and Affect: Mood normal.         Behavior: Behavior normal.          Result Review :     CMP          7/29/2024    08:56 9/30/2024    09:13 1/9/2025    12:59   CMP   Glucose 184  248  186    BUN 21  19  22    Creatinine 1.18  1.17  1.12    EGFR 46.2  46.7  49.2    Sodium 143  138  141    Potassium 4.3  3.8  4.0    Chloride 100  97  97    Calcium 9.6  9.3  9.4    BUN/Creatinine Ratio 17.8  16.2  19.6    Anion Gap 15.2  14.1  10.9      CBC          5/27/2024    00:15 5/28/2024    03:26 5/30/2024    01:06   CBC   WBC 9.17  7.73  6.66    RBC 3.07  2.83  2.72    Hemoglobin 11.3  10.3  10.1    Hematocrit 35.7  33.0  31.5    .3  116.6  115.8    MCH 36.8  36.4  37.1    MCHC 31.7  31.2  32.1    RDW 21.4  21.0  20.4    Platelets 260  217  207      Lipid Panel          5/30/2024    01:06   Lipid Panel   Total Cholesterol 186    Triglycerides 183    HDL Cholesterol 40    VLDL Cholesterol 32    LDL Cholesterol  114    LDL/HDL Ratio 2.74           ECG 12 Lead    Date/Time: 1/31/2025 10:13 AM  Performed by: Inessa Palafox APRN    Authorized by: Inessa Palafox APRN  Comparison: compared with previous ECG from 7/31/2024  Similar to previous ECG  Rhythm: sinus rhythm  Rate: normal  BPM: 67  Q waves: III and aVF    Comments: QTc 364           Most recent echocardiogram  Results for orders placed during the hospital encounter of  05/25/24    Adult Transthoracic Echo Complete W/ Cont if Necessary Per Protocol    Interpretation Summary    Left ventricular systolic function is hyperdynamic (EF > 70%). Calculated left ventricular EF = 71% Left ventricular ejection fraction appears to be 66 - 70%.    Left ventricular wall thickness is consistent with moderate concentric hypertrophy.    Left ventricular diastolic function was normal.    Moderate mitral valve regurgitation is present.    Moderate tricuspid valve regurgitation is present.    Estimated right ventricular systolic pressure from tricuspid regurgitation is moderately elevated (45-55 mmHg).    Moderate pulmonary hypertension is present.      Most recent Stress Test  Results for orders placed during the hospital encounter of 05/25/24    Stress Test With Myocardial Perfusion One Day    Interpretation Summary    Impressions are consistent with an intermediate risk study.    Left ventricular ejection fraction is normal.    1.  There is a small to medium sized, moderately intense perfusion defect in the mid to apical inferolateral and lateral wall.  This perfusion defect is present both in rest and stress images, however more intense in the stress images. 2 this partially reversible perfusion defect can be due to soft tissue attenuation, however ischemia cannot be entirely excluded on the basis of the study. 3.  LVEF intact at 84%..       Most recent Cardiac Cath  Results for orders placed during the hospital encounter of 05/25/24    Cardiac Catheterization/Vascular Study    Conclusion  Images from the original result were not included.  CARDIAC CATHETERIZATION / INTERVENTION REPORT        DATE OF PROCEDURE: 5/29/2024      INDICATION FOR PROCEDURE: shortness of breath  - 84-year-old female with dyspnea on exertion paroxysmal A-fib.  - Her stress test was abnormal suggesting inferolateral perfusion defect.  - Patient was recommended coronary angiogram.      PRE PROCEDURE DIAGNOSIS:  1.   Shortness of breath concerning for anginal equivalent  2.  Abnormal stress test.      POST PROCEDURE DIAGNOSIS:  Mild luminal irregularities in LAD.  Distal LAD small.  Mild luminal irregularities in LCx/OM.  30-40% mid RCA lesion, however there is no hemodynamically significant lesion.  LVEF 60-65% no gross regional wall motion abnormalities.  LVEDP 10 mmHg.      PROCEDURE PERFORMED:  1. Selective Coronary Angiogram  2. Left heart catheretization  3. Left Ventriculography      DESCRIPTION OF PROCEDURE:  Prior to the procedure risk, benefits and possible alternative were discussed with the patient and informed consent was obtained. Patient was brought to cardiac cath lab table in post absorbtive state. Patient was prepped and drape in usual sterile fashion. IV Versed and Fentanyl was used for moderate sedation. 2% Lidocaine was used for topical anesthesia.    R radial arterial site was prepped and a micropuncture needle was used to access the artery and a 5 F slender sheath was placed. 2.5 mg of Verapamil and 200 mcg of NTG was given through the sheath.    RCA was engaged with a JR4 catheter and left main was engaged with a JL 3.5 catheter.  Angiograms were taken multiple views.  Pigtail catheter was used to enter the LV, LV pressures were obtained, LV gram was performed using 30 mL contrast at 10 mL/s injection.  Pigtail catheter was flushed with saline and pulled back under continuous pressure monitoring.    After completion of procedure all catheters were removed.  Tumor blush was removed and hemostasis successfully obtained using transradial band.  Patient was transferred to recovery in stable condition.      DIAGNOSTIC FINDINGS:  LM: Large calibre vessel , normal take off from left cusp, divides into LAD and Lcx.  Left main free of disease.    LAD: Large-caliber vessel proximally.  After D1 it becomes somewhat of a small caliber vessel, distal LAD barely reaches the apex and small in caliber.  There is mild  luminal irregularities in LAD proper.  D1 is a medium caliber vessel without any significant lesion.    LCX: Large-caliber vessel in the proximal segment.  Then becomes medium in caliber, gives a small caliber posterolateral branch.  OM1 is a medium caliber vessel with luminal irregularities.  OM 2 is small.    RCA: Large calibre, dominant artery, normal take off from right cusp.  30-40% lesion in the midsegment.  Gives off a medium to large caliber RPDA supplies a large territory.  There is no significant posterolateral branch.    Left Ventriculography: LV systolic function was normal with visual estimated EF of 60-65%. No wall motion abnormalities. No significant mitral regurgitation noted.    Left heart catheterization: Left ventricular systolic pressure 138 mmHg.  LVEDP: 10 mmHg. No gradient across the aortic valve on pull back.    INTERVENTION: None    COMPLICATIONS : None      MISCELLANEOUS:  Clinical frailty: 3- Managing Well  ASA: 2=2- Mild to moderate systemic disease, medially well controlled, with no functional limitation  Mallampati: Class 2=2- Able to visualize the soft palate, fauces, uvula.  The anterior & posterior tonsilar pillars are hidden by the tongue.  EBL: Less than 10cc  Face to face mdoerate conscious  sedation time : None (no sedation given).      ASSESSMENT AND PLAN:  1.  Luminal irregularities in LAD and LCx.  2.  30-40% mid LAD lesion.  3.  LVEF 60-65%, no regional motion abnormalities.  LVEDP 10 mmHg.  4.  Patient does not have angiographically significant coronary artery disease.  No indication for PCI.  5.  Medical therapy and risk factor modifications.          Ezio Flores MD  05/29/24  12:08 EDT      Most recent Coronary CT  No results found for this or any previous visit.         Current Outpatient Medications   Medication Sig Dispense Refill    apixaban (ELIQUIS) 5 MG tablet tablet Take 1 tablet by mouth Every 12 (Twelve) Hours. Indications: Atrial Fibrillation       chlordiazePOXIDE (LIBRIUM) 10 MG capsule Take 1 capsule by mouth 2 (Two) Times a Day.      digoxin (LANOXIN) 125 MCG tablet Take 1 tablet by mouth Daily. 30 tablet 4    empagliflozin (Jardiance) 10 MG tablet tablet Take 1 tablet by mouth Daily for 180 days.      furosemide (Lasix) 40 MG tablet Take 1 tablet by mouth Every Other Day. 15 tablet 4    gabapentin (NEURONTIN) 100 MG capsule Take 1 capsule by mouth 2 (Two) Times a Day.      glucosamine-chondroitin 500-400 MG capsule capsule Take 1 capsule by mouth 2 (Two) Times a Day With Meals.      metoprolol tartrate (LOPRESSOR) 100 MG tablet Take 1 tablet by mouth 2 (Two) Times a Day. 60 tablet 4    potassium chloride 10 MEQ CR tablet TAKE 1 TABLET BY MOUTH EVERY OTHER DAY. TAKE ONLY WHEN FUROSEMIDE IS TAKEN 15 tablet 4    rosuvastatin (CRESTOR) 10 MG tablet Take 1 tablet by mouth Daily. 30 tablet 11     No current facility-administered medications for this visit.               Problem List Items Addressed This Visit          Cardiac and Vasculature    Coronary artery disease involving native coronary artery of native heart - Primary (Chronic)    Overview     5/25/2024 transthoracic echocardiogram: LVEF 66 to 70%, normal diastolic function, moderate concentric LV hypertrophy, moderate mitral valve regurgitation, moderate tricuspid valve regurgitation, and RVSP 45 to 55 mmHg.  5/29/2024 C for abnormal nuclear stress test: Mild luminal irregularities in the LAD, mild luminal irregularities in the LCx/OM, and 30 to 40% mid RCA lesion.         Current Assessment & Plan     Coronary Artery Disease: Coronary artery disease is stable.  Goals of Care:Medication tolerance and compliance and control progression of disease  Plan: Continue current medications.  Cardiac status will be reassessed in 6 months.          Relevant Orders    ECG 12 Lead    Paroxysmal atrial fibrillation (Chronic)    Current Assessment & Plan     Paroxysmal atrial fibrillation is stable.  Plan: Continue  current medications including Eliquis for stroke prophylaxis, metoprolol and digoxin for rate control.  Patient is currently in sinus rhythm.  Treatment goals: Medication compliance and prevention of stroke         Relevant Medications    apixaban (ELIQUIS) 5 MG tablet tablet    Dyslipidemia, goal LDL below 55 (Chronic)    Overview     5/30/2024 total cholesterol 186, triglycerides 183, HDL 40, and   12/4/2024 total cholesterol 217, triglycerides 284, HDL 34, and          Current Assessment & Plan     Dyslipidemia is Uncontrolled  Goals of care: Medication compliance and tolerance and Maintain LDL <55  Plan: Restart statin  Follow up: in 6 months               Relevant Medications    rosuvastatin (CRESTOR) 10 MG tablet       Assessment & Plan  1. Coronary artery disease.  Her EKG results today are consistent with the previous ones, indicating a stable sinus rhythm. She will continue her current medication regimen.    2. Dyslipidemia.  Her triglyceride levels are elevated at 284, exceeding the optimal level of less than 150. Additionally, her LDL cholesterol is high at 132, which should be less than 55. She has experienced adverse reactions to statins and Zetia in the past, including leg cramps that disrupt her sleep. A prescription for rosuvastatin 10 mg has been provided, with instructions to take it at night twice a week initially, and then increase to 3 times a week if tolerated. The prescription will be sent to [facility].     3. Paroxysmal atrial fibrillation.  She reports no issues with chest pain or palpitations. She is currently on Eliquis and has not experienced any bleeding problems. However, she has difficulty affording Eliquis. She is advised to contact [facility] to inquire about the cost of Eliquis, as it may be more affordable through mail order. A prescription for a 90-day supply of Eliquis will be sent to [facility] if it is financially feasible for her. If not, she is  instructed to contact our office when she has approximately one week's supply left, so we can arrange for additional Eliquis. Samples of Eliquis will be provided today to help manage her condition.         Follow Up     Return in about 6 months (around 7/31/2025).    I have assumed longitudinal care for complex medical condition(s) that require long-term management involving monitoring and adjustments to medications.  Goals of care, history of important events, and historical results can be found with each problem.  Episodic Plan of Care can be found  in the order section.    Patient was given instructions and counseling regarding her condition or for health maintenance advice. Please see specific information pulled into the AVS if appropriate.     Patient or patient representative verbalized consent for the use of Ambient Listening during the visit with  LATONYA Mary for chart documentation. 1/31/2025  14:30 EST

## 2025-01-31 NOTE — ASSESSMENT & PLAN NOTE
Coronary Artery Disease: Coronary artery disease is stable.  Goals of Care:Medication tolerance and compliance and control progression of disease  Plan: Continue current medications.  Cardiac status will be reassessed in 6 months.

## 2025-01-31 NOTE — ASSESSMENT & PLAN NOTE
Paroxysmal atrial fibrillation is stable.  Plan: Continue current medications including Eliquis for stroke prophylaxis, metoprolol and digoxin for rate control.  Patient is currently in sinus rhythm.  Treatment goals: Medication compliance and prevention of stroke

## 2025-01-31 NOTE — ASSESSMENT & PLAN NOTE
Dyslipidemia is Uncontrolled  Goals of care: Medication compliance and tolerance and Maintain LDL <55  Plan: Restart statin  Follow up: in 6 months

## 2025-02-21 ENCOUNTER — TELEPHONE (OUTPATIENT)
Dept: CARDIOLOGY | Facility: CLINIC | Age: 85
End: 2025-02-21
Payer: MEDICARE

## 2025-02-21 NOTE — TELEPHONE ENCOUNTER
patient called and wanted you to know that through her "[employer] Ins. she is receiving Eliquis for 3 month supply, is $125. She thought you may want to pass this information along to other "[employer] users. Thanks

## 2025-02-26 ENCOUNTER — OFFICE VISIT (OUTPATIENT)
Dept: PULMONOLOGY | Facility: CLINIC | Age: 85
End: 2025-02-26
Payer: MEDICARE

## 2025-02-26 VITALS
TEMPERATURE: 99 F | BODY MASS INDEX: 28.86 KG/M2 | HEART RATE: 72 BPM | WEIGHT: 173.2 LBS | OXYGEN SATURATION: 66 % | DIASTOLIC BLOOD PRESSURE: 70 MMHG | SYSTOLIC BLOOD PRESSURE: 128 MMHG | HEIGHT: 65 IN

## 2025-02-26 DIAGNOSIS — J96.11 CHRONIC RESPIRATORY FAILURE WITH HYPOXIA AND HYPERCAPNIA: Primary | ICD-10-CM

## 2025-02-26 DIAGNOSIS — J84.9 ILD (INTERSTITIAL LUNG DISEASE): ICD-10-CM

## 2025-02-26 DIAGNOSIS — J96.12 CHRONIC RESPIRATORY FAILURE WITH HYPOXIA AND HYPERCAPNIA: Primary | ICD-10-CM

## 2025-02-26 PROCEDURE — 1160F RVW MEDS BY RX/DR IN RCRD: CPT | Performed by: NURSE PRACTITIONER

## 2025-02-26 PROCEDURE — 99213 OFFICE O/P EST LOW 20 MIN: CPT | Performed by: NURSE PRACTITIONER

## 2025-02-26 PROCEDURE — 1159F MED LIST DOCD IN RCRD: CPT | Performed by: NURSE PRACTITIONER

## 2025-02-26 NOTE — PROGRESS NOTES
"Chief Complaint  ILD (interstitial lung disease)    Subjective          Aliyah Adkins presents to [facility] PULMONARY & CRITICAL CARE MEDICINE for   History of Present Illness    Ms. Adkins is an 84 year old female with a medical history significant for arthritis, hyperlipidemia, hypertension, and ILD.    She presents today for follow up on ILD.She states that she has been doing well since she was last seen. She reports that her breathing has been at baseline.  She is noted to be 66% on room air and states that she has her oxygen in the car.  She was placed on oxygen in the office and improved to 96% on 3 L.      Objective   Vital Signs:   /70   Pulse 72   Temp 99 °F (37.2 °C)   Ht 165.1 cm (65\")   Wt 78.6 kg (173 lb 3.2 oz)   SpO2 (!) 66% Comment: up to 96% on 3L  BMI 28.82 kg/m²         Physical Exam    GENERAL APPEARANCE: Well developed, well nourished, alert and cooperative, and appears to be in no acute distress.    HEAD: normocephalic. Atraumatic.    EYES: PERRL, EOMI. Vision is grossly intact.    THROAT: Oral cavity and pharynx normal. No inflammation, swelling, exudate, or lesions.     NECK: Neck supple.  No thyromegaly.    CARDIAC: Normal S1 and S2. No S3, S4 or murmurs. Rhythm is regular.     RESPIRATORY:Bilateral air entry positive. Bilateral diminished breath sounds. No wheezing, crackles or rhonchi noted.  crackles    GI: Positive bowel sounds. Soft, nondistended, nontender.     MUSCULOSKELETAL: No significant deformity or joint abnormality. No edema. Peripheral pulses intact. No varicosities.    NEUROLOGICAL: Strength and sensation symmetric and intact throughout.     PSYCHIATRIC: The mental examination revealed the patient was oriented to person, place, and time.       Estimated body mass index is 28.82 kg/m² as calculated from the following:    Height as of this encounter: 165.1 cm (65\").    Weight as of this encounter: 78.6 kg (173 lb 3.2 oz).        Result Review : " "  The following data was reviewed by: LATONYA Wagner on 02/26/2025:  Common labs          9/30/2024    09:13 12/4/2024    10:48 1/9/2025    12:59   Common Labs   Glucose 248   186    BUN 19   22    Creatinine 1.17   1.12    Sodium 138   141    Potassium 3.8   4.0    Chloride 97   97    Calcium 9.3   9.4    Hemoglobin A1C  7.4           Details          This result is from an external source.                  PFT:6/11/24        Low dose lung cancer screening:NA    Previous chest imaging:    FINDINGS:    LUNGS AND PLEURAL SPACES:  Diffuse interstitial lung disease.  No  pneumothorax.  No significant effusion.    HEART:  Coronary artery calcifications.  No significant pericardial  effusion.    BONES/JOINTS:  Unremarkable.  No acute fracture.  No dislocation.    SOFT TISSUES:  Stable right breast nodule.    VASCULATURE:  See above.    LYMPH NODES:  Unremarkable.  No enlarged lymph nodes.     IMPRESSION:    Diffuse interstitial lung disease.        This report was finalized on 6/19/2024 9:20 AM by Dr. Zane Caal MD.      Alpha-1 antitrypsin screening:NA    STOP-Bang Score:   NA  [place] Sleepiness Scale:   NA      ABG:    pH No results found for: \"PHART\"   pO2 No results found for: \"PO2ART\"   pCO2 No results found for: \"SKV2ITP\"   HCO3 No results found for: \"[id number]\"                      Assessment and Plan    Problem List Items Addressed This Visit          Pulmonary and Pneumonias    ILD (interstitial lung disease)    Relevant Orders    CT chest hi resolution    Chronic respiratory failure with hypoxia and hypercapnia - Primary       Aliyah Adkins  reports that she quit smoking about 35 years ago. Her smoking use included cigarettes. She started smoking about 61 years ago. She has a 26 pack-year smoking history. She has been exposed to tobacco smoke. She has never used smokeless tobacco.      Hi resolution CT Chest notable for diffuse ILD. Will plan to repeat scan in June 2025.    She is using " supplemental oxygen at 3 L.  She was noted to be 66% on room air to day in the office. States that her oxygen is in the car.  Placed her on 3 L and oxygen saturation improved to 96%.      Follow Up   Return in about 5 months (around 7/26/2025).  Patient was given instructions and counseling regarding her condition or for health maintenance advice. Please see specific information pulled into the AVS if appropriate.

## 2025-04-08 ENCOUNTER — HOSPITAL ENCOUNTER (OUTPATIENT)
Dept: CARDIOLOGY | Facility: HOSPITAL | Age: 85
Discharge: HOME | End: 2025-04-08
Admitting: PHYSICIAN ASSISTANT
Payer: MEDICARE

## 2025-04-08 VITALS
OXYGEN SATURATION: 96 % | WEIGHT: 169.8 LBS | HEIGHT: 65 IN | DIASTOLIC BLOOD PRESSURE: 53 MMHG | HEART RATE: 64 BPM | SYSTOLIC BLOOD PRESSURE: 112 MMHG | BODY MASS INDEX: 28.29 KG/M2

## 2025-04-08 DIAGNOSIS — B37.31 VAGINAL CANDIDIASIS: ICD-10-CM

## 2025-04-08 DIAGNOSIS — J84.9 ILD (INTERSTITIAL LUNG DISEASE): ICD-10-CM

## 2025-04-08 DIAGNOSIS — I50.32 CHRONIC HEART FAILURE WITH PRESERVED EJECTION FRACTION (HFPEF): Primary | ICD-10-CM

## 2025-04-08 DIAGNOSIS — J96.12 CHRONIC RESPIRATORY FAILURE WITH HYPOXIA AND HYPERCAPNIA: ICD-10-CM

## 2025-04-08 DIAGNOSIS — I48.0 PAROXYSMAL ATRIAL FIBRILLATION: Chronic | ICD-10-CM

## 2025-04-08 DIAGNOSIS — I25.10 CORONARY ARTERY DISEASE INVOLVING NATIVE CORONARY ARTERY OF NATIVE HEART WITHOUT ANGINA PECTORIS: Chronic | ICD-10-CM

## 2025-04-08 DIAGNOSIS — J96.11 CHRONIC RESPIRATORY FAILURE WITH HYPOXIA AND HYPERCAPNIA: ICD-10-CM

## 2025-04-08 LAB
ABSOLUTE LUNG FLUID CONTENT: 42 % (ref 20–35)
ANION GAP SERPL CALCULATED.3IONS-SCNC: 13.5 MMOL/L (ref 5–15)
BUN SERPL-MCNC: 19 MG/DL (ref 8–23)
BUN/CREAT SERPL: 17.8 (ref 7–25)
CALCIUM SPEC-SCNC: 9.5 MG/DL (ref 8.6–10.5)
CHLORIDE SERPL-SCNC: 97 MMOL/L (ref 98–107)
CO2 SERPL-SCNC: 30.5 MMOL/L (ref 22–29)
CREAT SERPL-MCNC: 1.07 MG/DL (ref 0.57–1)
EGFRCR SERPLBLD CKD-EPI 2021: 51.6 ML/MIN/1.73
GLUCOSE SERPL-MCNC: 226 MG/DL (ref 65–99)
MAGNESIUM SERPL-MCNC: 2.1 MG/DL (ref 1.6–2.4)
NT-PROBNP SERPL-MCNC: 199.9 PG/ML (ref 0–1800)
POTASSIUM SERPL-SCNC: 4.1 MMOL/L (ref 3.5–5.2)
SODIUM SERPL-SCNC: 141 MMOL/L (ref 136–145)

## 2025-04-08 PROCEDURE — 83880 ASSAY OF NATRIURETIC PEPTIDE: CPT | Performed by: PHYSICIAN ASSISTANT

## 2025-04-08 PROCEDURE — 83735 ASSAY OF MAGNESIUM: CPT | Performed by: PHYSICIAN ASSISTANT

## 2025-04-08 PROCEDURE — 94726 PLETHYSMOGRAPHY LUNG VOLUMES: CPT | Performed by: PHYSICIAN ASSISTANT

## 2025-04-08 PROCEDURE — 36415 COLL VENOUS BLD VENIPUNCTURE: CPT | Performed by: PHYSICIAN ASSISTANT

## 2025-04-08 PROCEDURE — 80048 BASIC METABOLIC PNL TOTAL CA: CPT | Performed by: PHYSICIAN ASSISTANT

## 2025-04-08 RX ORDER — FLUCONAZOLE 150 MG/1
150 TABLET ORAL ONCE
Qty: 1 TABLET | Refills: 0 | Status: SHIPPED | OUTPATIENT
Start: 2025-04-08 | End: 2025-04-09

## 2025-04-08 NOTE — PROGRESS NOTES
[facility]  Pharmacist Note     Aliyah Adkins is a 85 y.o. female seen in the [facility] Failure [facility] for HFpEF.      Aliyah Adkins reports a good understanding of medications.  She denies any swelling but reports some SOB with activity and some SOB today, but usually has not been experiencing any lately, other than today. She reports that she is taking Lasix 40mg qod. She reports that she has not checked her BP in a while, but reports that they were normally running good for her before.       Patient reports that for 1-2 weeks she has had vaginal itching/burning with painful urination. Patient denies discharge and malodor. Patient also denies changing any body wash/laundry detergent recently.     Patient denies the need for any refills or difficulty obtaining medications at this time.    She is curious if her Jardiance is covered.     Medication Use:   Hx of med intolerances:  None related to HF  Retail Rx Management: Rom[facility] Avila- pt prefers to remain here as it is in close proximity to her home    Patient reports Eliquis $125 co-pay for 3 months through mail order pt is ok with and Jardiance [employer] approved as of 1/7/25.     Past Medical History:   Diagnosis Date    Arthritis     Hyperlipidemia     Hypertension     ILD (interstitial lung disease)      ALLERGIES: Ezetimibe and Statins  Current Outpatient Medications   Medication Sig Dispense Refill    apixaban (ELIQUIS) 5 MG tablet tablet Take 1 tablet by mouth Every 12 (Twelve) Hours. Indications: Atrial Fibrillation      chlordiazePOXIDE (LIBRIUM) 10 MG capsule Take 1 capsule by mouth 2 (Two) Times a Day.      digoxin (LANOXIN) 125 MCG tablet Take 1 tablet by mouth Daily. 30 tablet 4    empagliflozin (Jardiance) 10 MG tablet tablet Take 1 tablet by mouth Daily for 180 days.      furosemide (Lasix) 40 MG tablet Take 1 tablet by mouth Every Other Day. 15 tablet 4    gabapentin (NEURONTIN) 100 MG capsule Take 1 capsule by mouth 2 (Two) Times a Day.    "   glucosamine-chondroitin 500-400 MG capsule capsule Take 1 capsule by mouth 2 (Two) Times a Day With Meals.      metoprolol tartrate (LOPRESSOR) 100 MG tablet Take 1 tablet by mouth 2 (Two) Times a Day. 60 tablet 4    potassium chloride 10 MEQ CR tablet TAKE 1 TABLET BY MOUTH EVERY OTHER DAY. TAKE ONLY WHEN FUROSEMIDE IS TAKEN 15 tablet 4    rosuvastatin (CRESTOR) 10 MG tablet Take 1 tablet by mouth Daily. 30 tablet 11    fluconazole (DIFLUCAN) 150 MG tablet Take 1 tablet by mouth 1 (One) Time for 1 dose. 1 tablet 0     No current facility-administered medications for this encounter.       Vaccination History:   Pneumonia: PCV 2014; PPSV-23 2017: Patient is eligible for PCV 20 and will discuss with PCP (pt unsure if she has had and will speak with Dr. Keller)  Annual Influenza: Declines   Shingles: Completed Shingrix 2 dose series     Objective  Vitals:    04/08/25 1003   BP: 112/53   BP Location: Left arm   Patient Position: Sitting   Cuff Size: Adult   Pulse: 64   SpO2: 96%   Weight: 77 kg (169 lb 12.8 oz)   Height: 165.1 cm (65\")       Wt Readings from Last 3 Encounters:   04/08/25 77 kg (169 lb 12.8 oz)   02/26/25 78.6 kg (173 lb 3.2 oz)   01/31/25 77.8 kg (171 lb 9.6 oz)         04/08/25  1003   Weight: 77 kg (169 lb 12.8 oz)       Lab Results   Component Value Date    GLUCOSE 186 (H) 01/09/2025    BUN 22 01/09/2025    CREATININE 1.12 (H) 01/09/2025    BCR 19.6 01/09/2025    K 4.0 01/09/2025    CO2 33.1 (H) 01/09/2025    CALCIUM 9.4 01/09/2025    ALBUMIN 4.1 05/26/2024    AST 24 05/26/2024    ALT 27 05/26/2024     Lab Results   Component Value Date    WBC 6.66 05/30/2024    HGB 10.1 (L) 05/30/2024    HCT 31.5 (L) 05/30/2024    .8 (H) 05/30/2024     05/30/2024     Lab Results   Component Value Date    TROPONINT 16 (H) 05/26/2024     Lab Results   Component Value Date    PROBNP 247.4 01/09/2025     Results for orders placed during the hospital encounter of 05/25/24    Adult Transthoracic Echo " Complete W/ Cont if Necessary Per Protocol    Interpretation Summary    Left ventricular systolic function is hyperdynamic (EF > 70%). Calculated left ventricular EF = 71% Left ventricular ejection fraction appears to be 66 - 70%.    Left ventricular wall thickness is consistent with moderate concentric hypertrophy.    Left ventricular diastolic function was normal.    Moderate mitral valve regurgitation is present.    Moderate tricuspid valve regurgitation is present.    Estimated right ventricular systolic pressure from tricuspid regurgitation is moderately elevated (45-55 mmHg).    Moderate pulmonary hypertension is present.         GDMT    Drug Class   Drug   Dose Last Dose Adjustment Additional Titration   Notes   ACEi/ARB/ARNI        Beta Blocker Lopressor  100 mg BID 5/2024     MRA        SGLT2i Jardiance  10 mg QD  N/A    Lasix 40mg QOD      Drug Therapy Problems    Vaginal burning/painful urination      Recommendations    Made Sola aware. Sola sent in 1x dose of fluconazole 150mg to [facility] to bring up medication to patient in the heart failure clinic.      Patient was educated on heart failure medications and the importance of medication adherence. All questions were addressed and patient expressed  understanding.     Thank you for allowing me to participate in the care of your patient,    Tod Herrera, PharmD  04/08/25  10:30 EDT
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
## 2025-04-08 NOTE — PATIENT INSTRUCTIONS
Heart Failure Action Plan  A heart failure action plan helps you know what to do when you have symptoms of heart failure. Your action plan is a color-coded plan that lists the symptoms to watch for and indicates what actions to take.  If you have symptoms in the green zone, you're doing well.  If you have symptoms in the yellow zone, you're having problems.  If you have symptoms in the red zone, you need medical care right away.  Follow the plan that was created by you and your health care provider. Review your plan each time you visit your provider.  Green zone  These signs mean you're doing well and can continue what you're doing:  You don't have new or worsening shortness of breath.  You have very little swelling or no new swelling.  Your weight is stable (no gain or loss).  You have a normal activity level.  You don't have chest pain or any other new symptoms.  Yellow zone  These signs and symptoms mean your condition may be getting worse and you should make some changes:  You have trouble breathing when you're active.  You have swelling in your feet or legs or have discomfort in your belly.  You gain 2-3 lb (0.9-1.4 kg) in 24 hours, or 5 lb (2.3 kg) in a week. This amount may be more or less depending on your condition.  You get tired easily.  You have trouble sleeping.  You have a dry cough.  If you have any of these symptoms:  Contact your provider within the next day.  Your provider may adjust your medicines.  Red zone  These signs and symptoms mean you should get medical help right away:  You have trouble breathing when resting or cannot lie flat and you need to raise your head to help you breathe.  You have a dry cough that's getting worse.  You have swelling or pain in your feet or legs or discomfort in your belly that's getting worse.  You suddenly gain more than 2-3 lb (0.9-1.4 kg) in 24 hours, or more than 5 lb (2.3 kg) in a week. This amount may be more or less depending on your condition.  You have  trouble staying awake or you feel confused.  You don't have an appetite.  You have worsening sadness or depression.  These symptoms may be an emergency. Call 911 right away.  Do not wait to see if the symptoms will go away.  Do not drive yourself to the hospital.  Follow these instructions at home:  Take medicines only as told.  Eat a heart-healthy diet. Work with a dietitian to create an eating plan that's best for you.  Weigh yourself each day.   Call your provider if you gain more than 3 lb in 24 hours, or more than 5 lb in a week.  Health care provider name: [facility] care provider phone number: [phone]

## 2025-04-08 NOTE — PROGRESS NOTES
Heart Failure Clinic    Date: 04/08/25     Vitals:    04/08/25 1003   BP: 112/53   Pulse: 64   SpO2: 96%    Weight 169.8    Method of arrival: Ambulatory O2@3LPM     Weighing self daily: Most days    Monitoring Heart Failure Zones: Most days    Today's HF Zone: Yellow    Taking medications as prescribed: Yes    Edema No    Shortness of Air: No    Number of pillows used at night:<2    Educational Materials given:  avs                                                                         ReDS Value: 42  Over 41 Hypervolemic Status      Yasir Guillaume RN 04/08/25 10:05 EDT

## 2025-04-28 RX ORDER — FLUCONAZOLE 150 MG/1
150 TABLET ORAL ONCE
Qty: 1 TABLET | Refills: 0 | Status: SHIPPED | OUTPATIENT
Start: 2025-04-28 | End: 2025-04-28

## 2025-05-16 ENCOUNTER — DOCUMENTATION (OUTPATIENT)
Dept: CARDIOLOGY | Facility: HOSPITAL | Age: 85
End: 2025-05-16
Payer: MEDICARE

## 2025-06-20 ENCOUNTER — HOSPITAL ENCOUNTER (OUTPATIENT)
Facility: HOSPITAL | Age: 85
Discharge: HOME | End: 2025-06-20
Payer: MEDICARE

## 2025-06-20 DIAGNOSIS — J84.9 ILD (INTERSTITIAL LUNG DISEASE): ICD-10-CM

## 2025-06-20 PROCEDURE — 71250 CT THORAX DX C-: CPT

## 2025-06-20 PROCEDURE — 71250 CT THORAX DX C-: CPT | Performed by: RADIOLOGY

## 2025-07-18 ENCOUNTER — TELEPHONE (OUTPATIENT)
Dept: CARDIOLOGY | Facility: HOSPITAL | Age: 85
End: 2025-07-18
Payer: MEDICARE

## 2025-07-18 ENCOUNTER — TELEPHONE (OUTPATIENT)
Dept: CARDIOLOGY | Facility: CLINIC | Age: 85
End: 2025-07-18
Payer: MEDICARE

## 2025-07-18 NOTE — TELEPHONE ENCOUNTER
Patient called in and states she is on eliquis. Patient states that this morning had blood in her urine. Does not have any other symptoms. States this is the second time this has happed in the past week. Patient is concerned and did not take her morning dose of Eliquis. Patient requests to be contacted with instruction.

## 2025-07-18 NOTE — TELEPHONE ENCOUNTER
Pt called in to HF clinic and stated that she had blood in her urine this morning and was concerned about it so she didn't take her Eliquis. She states she had no burning or pain with urination. I advised her to contact her PCP regarding this to make sure she doesn't have a UTI. I also transferred her to Interventional Cardiology as they prescribe her Eliquis.

## 2025-07-24 ENCOUNTER — OFFICE VISIT (OUTPATIENT)
Dept: PULMONOLOGY | Facility: CLINIC | Age: 85
End: 2025-07-24
Payer: MEDICARE

## 2025-07-24 VITALS
OXYGEN SATURATION: 90 % | BODY MASS INDEX: 27.96 KG/M2 | DIASTOLIC BLOOD PRESSURE: 70 MMHG | HEART RATE: 72 BPM | SYSTOLIC BLOOD PRESSURE: 120 MMHG | WEIGHT: 167.8 LBS | HEIGHT: 65 IN | TEMPERATURE: 97.7 F

## 2025-07-24 DIAGNOSIS — J84.9 ILD (INTERSTITIAL LUNG DISEASE): Primary | ICD-10-CM

## 2025-07-24 DIAGNOSIS — J96.12 CHRONIC RESPIRATORY FAILURE WITH HYPOXIA AND HYPERCAPNIA: ICD-10-CM

## 2025-07-24 DIAGNOSIS — J96.11 CHRONIC RESPIRATORY FAILURE WITH HYPOXIA AND HYPERCAPNIA: ICD-10-CM

## 2025-07-24 RX ORDER — ERGOCALCIFEROL 1.25 MG/1
1 CAPSULE, LIQUID FILLED ORAL WEEKLY
COMMUNITY
Start: 2025-05-25

## 2025-07-28 ENCOUNTER — HOSPITAL ENCOUNTER (OUTPATIENT)
Dept: CARDIOLOGY | Facility: HOSPITAL | Age: 85
Discharge: HOME | End: 2025-07-28
Admitting: PHYSICIAN ASSISTANT
Payer: MEDICARE

## 2025-07-28 VITALS
SYSTOLIC BLOOD PRESSURE: 118 MMHG | WEIGHT: 167 LBS | BODY MASS INDEX: 27.82 KG/M2 | OXYGEN SATURATION: 94 % | HEART RATE: 60 BPM | DIASTOLIC BLOOD PRESSURE: 57 MMHG | HEIGHT: 65 IN

## 2025-07-28 DIAGNOSIS — J96.11 CHRONIC RESPIRATORY FAILURE WITH HYPOXIA AND HYPERCAPNIA: ICD-10-CM

## 2025-07-28 DIAGNOSIS — J84.9 ILD (INTERSTITIAL LUNG DISEASE): ICD-10-CM

## 2025-07-28 DIAGNOSIS — B37.31 VAGINAL CANDIDIASIS: ICD-10-CM

## 2025-07-28 DIAGNOSIS — I50.32 CHRONIC HEART FAILURE WITH PRESERVED EJECTION FRACTION (HFPEF): Primary | ICD-10-CM

## 2025-07-28 DIAGNOSIS — E11.42 TYPE 2 DIABETES MELLITUS WITH DIABETIC POLYNEUROPATHY, WITHOUT LONG-TERM CURRENT USE OF INSULIN: ICD-10-CM

## 2025-07-28 DIAGNOSIS — J96.12 CHRONIC RESPIRATORY FAILURE WITH HYPOXIA AND HYPERCAPNIA: ICD-10-CM

## 2025-07-28 DIAGNOSIS — M06.09 RHEUMATOID ARTHRITIS WITHOUT RHEUMATOID FACTOR, MULTIPLE SITES: ICD-10-CM

## 2025-07-28 LAB
ABSOLUTE LUNG FLUID CONTENT: 45 % (ref 20–35)
ALBUMIN SERPL-MCNC: 4.5 G/DL (ref 3.5–5.2)
ALBUMIN/GLOB SERPL: 1.6 G/DL
ALP SERPL-CCNC: 76 U/L (ref 39–117)
ALT SERPL W P-5'-P-CCNC: 12 U/L (ref 1–33)
ANION GAP SERPL CALCULATED.3IONS-SCNC: 13.3 MMOL/L (ref 5–15)
AST SERPL-CCNC: 26 U/L (ref 1–32)
BILIRUB SERPL-MCNC: 0.8 MG/DL (ref 0–1.2)
BUN SERPL-MCNC: 18 MG/DL (ref 8–23)
BUN/CREAT SERPL: 16.7 (ref 7–25)
CALCIUM SPEC-SCNC: 8.9 MG/DL (ref 8.6–10.5)
CHLORIDE SERPL-SCNC: 102 MMOL/L (ref 98–107)
CO2 SERPL-SCNC: 26.7 MMOL/L (ref 22–29)
CREAT SERPL-MCNC: 1.08 MG/DL (ref 0.57–1)
DEPRECATED RDW RBC AUTO: 93.6 FL (ref 37–54)
EGFRCR SERPLBLD CKD-EPI 2021: 51.1 ML/MIN/1.73
ERYTHROCYTE [DISTWIDTH] IN BLOOD BY AUTOMATED COUNT: 22.1 % (ref 12.3–15.4)
FERRITIN SERPL-MCNC: 360 NG/ML (ref 13–150)
GLOBULIN UR ELPH-MCNC: 2.9 GM/DL
GLUCOSE SERPL-MCNC: 194 MG/DL (ref 65–99)
HCT VFR BLD AUTO: 34.1 % (ref 34–46.6)
HGB BLD-MCNC: 10.7 G/DL (ref 12–15.9)
IRON 24H UR-MRATE: 149 MCG/DL (ref 37–145)
IRON SATN MFR SERPL: 41 % (ref 20–50)
MAGNESIUM SERPL-MCNC: 2 MG/DL (ref 1.6–2.4)
MCH RBC QN AUTO: 35.7 PG (ref 26.6–33)
MCHC RBC AUTO-ENTMCNC: 31.4 G/DL (ref 31.5–35.7)
MCV RBC AUTO: 113.7 FL (ref 79–97)
NT-PROBNP SERPL-MCNC: 191 PG/ML (ref 0–1800)
PLATELET # BLD AUTO: 371 10*3/MM3 (ref 140–450)
PMV BLD AUTO: 10 FL (ref 6–12)
POTASSIUM SERPL-SCNC: 5.1 MMOL/L (ref 3.5–5.2)
PROT SERPL-MCNC: 7.4 G/DL (ref 6–8.5)
RBC # BLD AUTO: 3 10*6/MM3 (ref 3.77–5.28)
SODIUM SERPL-SCNC: 142 MMOL/L (ref 136–145)
TIBC SERPL-MCNC: 364 MCG/DL (ref 298–536)
TRANSFERRIN SERPL-MCNC: 244 MG/DL (ref 200–360)
WBC NRBC COR # BLD AUTO: 10.15 10*3/MM3 (ref 3.4–10.8)

## 2025-07-28 PROCEDURE — 83540 ASSAY OF IRON: CPT | Performed by: PHYSICIAN ASSISTANT

## 2025-07-28 PROCEDURE — 80053 COMPREHEN METABOLIC PANEL: CPT | Performed by: PHYSICIAN ASSISTANT

## 2025-07-28 PROCEDURE — 84466 ASSAY OF TRANSFERRIN: CPT | Performed by: PHYSICIAN ASSISTANT

## 2025-07-28 PROCEDURE — 94726 PLETHYSMOGRAPHY LUNG VOLUMES: CPT | Performed by: PHYSICIAN ASSISTANT

## 2025-07-28 PROCEDURE — 83735 ASSAY OF MAGNESIUM: CPT | Performed by: PHYSICIAN ASSISTANT

## 2025-07-28 PROCEDURE — 83880 ASSAY OF NATRIURETIC PEPTIDE: CPT | Performed by: PHYSICIAN ASSISTANT

## 2025-07-28 PROCEDURE — 82728 ASSAY OF FERRITIN: CPT | Performed by: PHYSICIAN ASSISTANT

## 2025-07-28 PROCEDURE — 36415 COLL VENOUS BLD VENIPUNCTURE: CPT | Performed by: PHYSICIAN ASSISTANT

## 2025-07-28 PROCEDURE — 85027 COMPLETE CBC AUTOMATED: CPT | Performed by: PHYSICIAN ASSISTANT

## 2025-07-28 RX ORDER — NYSTATIN 100000 [USP'U]/G
POWDER TOPICAL 3 TIMES DAILY
Qty: 30 G | Refills: 2 | Status: SHIPPED | OUTPATIENT
Start: 2025-07-28 | End: 2025-07-28 | Stop reason: SDUPTHER

## 2025-07-28 RX ORDER — NYSTATIN 100000 [USP'U]/G
POWDER TOPICAL 3 TIMES DAILY
Qty: 30 G | Refills: 2 | Status: SHIPPED | OUTPATIENT
Start: 2025-07-28

## 2025-07-28 RX ORDER — FLUCONAZOLE 100 MG/1
100 TABLET ORAL DAILY
Qty: 1 TABLET | Refills: 0 | Status: SHIPPED | OUTPATIENT
Start: 2025-07-28 | End: 2025-07-28 | Stop reason: SDUPTHER

## 2025-07-28 RX ORDER — FLUCONAZOLE 100 MG/1
100 TABLET ORAL DAILY
Qty: 1 TABLET | Refills: 0 | Status: SHIPPED | OUTPATIENT
Start: 2025-07-28 | End: 2025-07-29

## 2025-07-28 NOTE — PROGRESS NOTES
[facility] Failure Clinic  Pharmacist Note     Aliyah Adkins is a 85 y.o. female seen in the [facility] Failure [facility] for HFpEF.      Aliyah Adkins reports a good understanding of medications.  She denies any swelling but reports SOB with any activity. She currently taking Lasix 40mg QOD. She reports that her BP's at home are normally good and denies any lightheadedness.  She reports that she got another yeast infection shortley after her last visit and therefore, stopped taking the Jardiance altogether. She reports that she is still seems to have it as well.       Medication Use:   Hx of med intolerances:  Jardiance- yeast  Retail Rx Management: Romaine Avila- pt prefers to remain here as it is in close proximity to her home    Patient reports Eliquis $125 co-pay for 3 months through mail order pt is ok with    Past Medical History:   Diagnosis Date    Arthritis     Hyperlipidemia     Hypertension     ILD (interstitial lung disease)      ALLERGIES: Ezetimibe and Statins  Current Outpatient Medications   Medication Sig Dispense Refill    apixaban (ELIQUIS) 5 MG tablet tablet Take 1 tablet by mouth Every 12 (Twelve) Hours. Indications: Atrial Fibrillation      chlordiazePOXIDE (LIBRIUM) 10 MG capsule Take 1 capsule by mouth 2 (Two) Times a Day.      digoxin (LANOXIN) 125 MCG tablet Take 1 tablet by mouth Daily. 30 tablet 4    fluconazole (Diflucan) 100 MG tablet Take 1 tablet by mouth Daily for 1 day. 1 tablet 0    furosemide (Lasix) 40 MG tablet Take 1 tablet by mouth Every Other Day. 15 tablet 4    gabapentin (NEURONTIN) 100 MG capsule Take 1 capsule by mouth 2 (Two) Times a Day.      glucosamine-chondroitin 500-400 MG capsule capsule Take 1 capsule by mouth 2 (Two) Times a Day With Meals.      metoprolol tartrate (LOPRESSOR) 100 MG tablet Take 1 tablet by mouth 2 (Two) Times a Day. 60 tablet 4    nystatin (MYCOSTATIN) 777142 UNIT/GM powder Apply  topically to the appropriate area as directed 3 (Three) Times a Day. 30 g  "2    potassium chloride 10 MEQ CR tablet TAKE 1 TABLET BY MOUTH EVERY OTHER DAY. TAKE ONLY WHEN FUROSEMIDE IS TAKEN 15 tablet 4    vitamin D (ERGOCALCIFEROL) 1.25 MG (98897 UT) capsule capsule Take 1 capsule by mouth 1 (One) Time Per Week.      rosuvastatin (CRESTOR) 10 MG tablet Take 1 tablet by mouth Daily. (Patient not taking: Reported on 7/28/2025) 30 tablet 11     No current facility-administered medications for this encounter.       Vaccination History:   Pneumonia: PCV 2014; PPSV-23 2017: Patient is eligible for PCV 20 and will discuss with PCP (pt unsure if she has had and will speak with Dr. Keller)  Annual Influenza: Declines   Shingles: Completed Shingrix 2 dose series     Objective  Vitals:    07/28/25 0919   BP: 118/57   BP Location: Left arm   Patient Position: Sitting   Cuff Size: Adult   Pulse: 60   SpO2: 94%   Weight: 75.8 kg (167 lb)   Height: 165.1 cm (65\")         Wt Readings from Last 3 Encounters:   07/28/25 75.8 kg (167 lb)   07/24/25 76.1 kg (167 lb 12.8 oz)   04/08/25 77 kg (169 lb 12.8 oz)         07/28/25 0919   Weight: 75.8 kg (167 lb)         Lab Results   Component Value Date    GLUCOSE 194 (H) 07/28/2025    BUN 18.0 07/28/2025    CREATININE 1.08 (H) 07/28/2025    BCR 16.7 07/28/2025    K 5.1 07/28/2025    CO2 26.7 07/28/2025    CALCIUM 8.9 07/28/2025    ALBUMIN 4.5 07/28/2025    AST 26 07/28/2025    ALT 12 07/28/2025     Lab Results   Component Value Date    WBC 10.15 07/28/2025    HGB 10.7 (L) 07/28/2025    HCT 34.1 07/28/2025    .7 (H) 07/28/2025     07/28/2025     Lab Results   Component Value Date    TROPONINT 16 (H) 05/26/2024     Lab Results   Component Value Date    PROBNP 191.0 07/28/2025     Results for orders placed during the hospital encounter of 05/25/24    Adult Transthoracic Echo Complete W/ Cont if Necessary Per Protocol 05/27/2024  1:33 PM    Interpretation Summary    Left ventricular systolic function is hyperdynamic (EF > 70%). Calculated left " ventricular EF = 71% Left ventricular ejection fraction appears to be 66 - 70%.    Left ventricular wall thickness is consistent with moderate concentric hypertrophy.    Left ventricular diastolic function was normal.    Moderate mitral valve regurgitation is present.    Moderate tricuspid valve regurgitation is present.    Estimated right ventricular systolic pressure from tricuspid regurgitation is moderately elevated (45-55 mmHg).    Moderate pulmonary hypertension is present.         GDMT    Drug Class   Drug   Dose Last Dose Adjustment Additional Titration   Notes   ACEi/ARB/ARNI        Beta Blocker Lopressor  100 mg BID 5/2024     MRA        SGLT2i  Recurrent yeast infections    Lasix 40mg QOD      Drug Therapy Problems    Current yeast infection      Recommendations    Made Sola aware of stopping Jardiance and current issues. Sola to send treatment to us to be delivered to the patient before leaving.       Patient was educated on heart failure medications and the importance of medication adherence. All questions were addressed and patient expressed  understanding.     Thank you for allowing me to participate in the care of your patient,    Valorie Ravi, PharmD  07/28/25  10:48 EDT

## 2025-07-28 NOTE — PROGRESS NOTES
Heart Failure Clinic    Date: 07/28/25     Vitals:    07/28/25 0919   BP: 118/57   Pulse: 60   SpO2: 94%    Weight 167    Method of arrival: Ambulatory O2@4LPM    Weighing self daily: Yes    Monitoring Heart Failure Zones: Yes    Today's HF Zone: Green    Taking medications as prescribed: Yes    Edema No    Shortness of Air: Yes with activity    Number of pillows used at night:<2    Educational Materials given:  avs                                                                         ReDS Value: 45  Over 41 Hypervolemic Status      Yasir Guillaume RN 07/28/25 09:23 EDT

## 2025-07-28 NOTE — PATIENT INSTRUCTIONS
Heart Failure Action Plan  A heart failure action plan helps you know what to do when you have symptoms of heart failure. Your action plan is a color-coded plan that lists the symptoms to watch for and indicates what actions to take.  If you have symptoms in the green zone, you're doing well.  If you have symptoms in the yellow zone, you're having problems.  If you have symptoms in the red zone, you need medical care right away.  Follow the plan that was created by you and your health care provider. Review your plan each time you visit your provider.  Green zone  These signs mean you're doing well and can continue what you're doing:  You don't have new or worsening shortness of breath.  You have very little swelling or no new swelling.  Your weight is stable (no gain or loss).  You have a normal activity level.  You don't have chest pain or any other new symptoms.  Yellow zone  These signs and symptoms mean your condition may be getting worse and you should make some changes:  You have trouble breathing when you're active.  You have swelling in your feet or legs or have discomfort in your belly.  You gain 2-3 lb (0.9-1.4 kg) in 24 hours, or 5 lb (2.3 kg) in a week. This amount may be more or less depending on your condition.  You get tired easily.  You have trouble sleeping.  You have a dry cough.  If you have any of these symptoms:  Contact your provider within the next day.  Your provider may adjust your medicines.  Red zone  These signs and symptoms mean you should get medical help right away:  You have trouble breathing when resting or cannot lie flat and you need to raise your head to help you breathe.  You have a dry cough that's getting worse.  You have swelling or pain in your feet or legs or discomfort in your belly that's getting worse.  You suddenly gain more than 2-3 lb (0.9-1.4 kg) in 24 hours, or more than 5 lb (2.3 kg) in a week. This amount may be more or less depending on your condition.  You have  trouble staying awake or you feel confused.  You don't have an appetite.  You have worsening sadness or depression.  These symptoms may be an emergency. Call 911 right away.  Do not wait to see if the symptoms will go away.  Do not drive yourself to the hospital.  Follow these instructions at home:  Take medicines only as told.  Eat a heart-healthy diet. Work with a dietitian to create an eating plan that's best for you.  Weigh yourself each day.   Call your provider if you gain more than 3 lb in 24 hours, or more than 5 lb in a week.  Health care provider name: Sola [facility] care provider phone number: [phone]
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
## 2025-08-20 ENCOUNTER — OFFICE VISIT (OUTPATIENT)
Dept: CARDIOLOGY | Facility: CLINIC | Age: 85
End: 2025-08-20
Payer: MEDICARE

## 2025-08-20 VITALS
BODY MASS INDEX: 27.82 KG/M2 | SYSTOLIC BLOOD PRESSURE: 120 MMHG | HEIGHT: 65 IN | OXYGEN SATURATION: 94 % | DIASTOLIC BLOOD PRESSURE: 64 MMHG | WEIGHT: 167 LBS | HEART RATE: 55 BPM

## 2025-08-20 DIAGNOSIS — I10 ESSENTIAL HYPERTENSION: Chronic | ICD-10-CM

## 2025-08-20 DIAGNOSIS — I25.10 CORONARY ARTERY DISEASE INVOLVING NATIVE CORONARY ARTERY OF NATIVE HEART WITHOUT ANGINA PECTORIS: Primary | Chronic | ICD-10-CM

## 2025-08-20 DIAGNOSIS — I48.0 PAROXYSMAL ATRIAL FIBRILLATION: Chronic | ICD-10-CM

## 2025-08-20 DIAGNOSIS — E78.5 DYSLIPIDEMIA, GOAL LDL BELOW 70: Chronic | ICD-10-CM

## 2025-08-20 DIAGNOSIS — I50.32 CHRONIC HEART FAILURE WITH PRESERVED EJECTION FRACTION (HFPEF): Chronic | ICD-10-CM

## (undated) DEVICE — CATH F5 INF JR 4 100CM: Brand: INFINITI

## (undated) DEVICE — GLIDESHEATH SLENDER STAINLESS STEEL KIT: Brand: GLIDESHEATH SLENDER

## (undated) DEVICE — CATH VENT MIV RADL PIG LNG TIP 5F 110CM

## (undated) DEVICE — LN FLTR ORL/NASL MICROSTREAM NONINTUB A/LNG

## (undated) DEVICE — DRAPE, RADIAL, STERILE: Brand: MEDLINE

## (undated) DEVICE — RADIAL RUNWAY TOP PADS: Brand: RADIAL RUNWAY TOP PADS

## (undated) DEVICE — MODEL AT P65, P/N 701554-001KIT CONTENTS: HAND CONTROLLER, 3-WAY HIGH-PRESSURE STOPCOCK WITH ROTATING END AND PREMIUM HIGH-PRESSURE TUBING: Brand: ANGIOTOUCH® KIT

## (undated) DEVICE — ST INF PRI SMRTSTE 20DRP 2VLV 24ML 117

## (undated) DEVICE — PAD, DEFIB, ADULT, RADIOTRANS, ZOLL: Brand: MEDLINE

## (undated) DEVICE — GW INQW FIX/CORE PTFE J/3MM .035 260CM

## (undated) DEVICE — TR BAND RADIAL ARTERY COMPRESSION DEVICE: Brand: TR BAND

## (undated) DEVICE — A2000 MULTI-USE SYRINGE KIT, P/N 701277-003KIT CONTENTS: 100ML CONTRAST RESERVOIR AND TUBING WITH CONTRAST SPIKE AND CLAMP: Brand: A2000 MULTI-USE SYRINGE KIT

## (undated) DEVICE — DRSNG SURESITE WNDW 4X4.5

## (undated) DEVICE — ADULT DISPOSABLE SINGLE-PATIENT USE PULSE OXIMETER SENSOR: Brand: NONIN

## (undated) DEVICE — PK CATH CARD 70

## (undated) DEVICE — ST EXT IV SMRTSTE 2VLV FIX M LL 6ML 41

## (undated) DEVICE — CATH F5 INF JL 3.5 100CM: Brand: INFINITI